# Patient Record
Sex: MALE | Race: BLACK OR AFRICAN AMERICAN | Employment: FULL TIME | URBAN - METROPOLITAN AREA
[De-identification: names, ages, dates, MRNs, and addresses within clinical notes are randomized per-mention and may not be internally consistent; named-entity substitution may affect disease eponyms.]

---

## 2017-02-17 ENCOUNTER — OFFICE VISIT (OUTPATIENT)
Dept: FAMILY MEDICINE CLINIC | Age: 57
End: 2017-02-17

## 2017-02-17 VITALS
HEART RATE: 88 BPM | BODY MASS INDEX: 25.48 KG/M2 | TEMPERATURE: 98.4 F | OXYGEN SATURATION: 98 % | SYSTOLIC BLOOD PRESSURE: 132 MMHG | WEIGHT: 188.1 LBS | DIASTOLIC BLOOD PRESSURE: 81 MMHG | HEIGHT: 72 IN | RESPIRATION RATE: 16 BRPM

## 2017-02-17 DIAGNOSIS — R36.9 PENILE DISCHARGE: Primary | ICD-10-CM

## 2017-02-17 DIAGNOSIS — Z11.3 SCREENING FOR GONORRHEA: ICD-10-CM

## 2017-02-17 DIAGNOSIS — Z20.2 STD EXPOSURE: ICD-10-CM

## 2017-02-17 DIAGNOSIS — Z11.8 SCREENING FOR CHLAMYDIAL DISEASE: ICD-10-CM

## 2017-02-17 PROCEDURE — 96372 THER/PROPH/DIAG INJ SC/IM: CPT | Performed by: FAMILY MEDICINE

## 2017-02-17 PROCEDURE — 99214 OFFICE O/P EST MOD 30 MIN: CPT | Performed by: FAMILY MEDICINE

## 2017-02-17 PROCEDURE — 81003 URINALYSIS AUTO W/O SCOPE: CPT | Performed by: FAMILY MEDICINE

## 2017-02-17 RX ORDER — CEFTRIAXONE SODIUM 250 MG/1
250 INJECTION, POWDER, FOR SOLUTION INTRAMUSCULAR; INTRAVENOUS ONCE
Status: COMPLETED | OUTPATIENT
Start: 2017-02-17 | End: 2017-02-17

## 2017-02-17 RX ORDER — AZITHROMYCIN 500 MG/1
1000 TABLET, FILM COATED ORAL DAILY
Qty: 2 TABLET | Refills: 0 | Status: SHIPPED | OUTPATIENT
Start: 2017-02-17 | End: 2017-02-18

## 2017-02-17 RX ADMIN — CEFTRIAXONE SODIUM 250 MG: 250 INJECTION, POWDER, FOR SOLUTION INTRAMUSCULAR; INTRAVENOUS at 15:47

## 2017-02-17 ASSESSMENT — ENCOUNTER SYMPTOMS
CHOKING: 0
CHEST TIGHTNESS: 0
BLOOD IN STOOL: 0
CONSTIPATION: 0
VOMITING: 0
ABDOMINAL DISTENTION: 0
COUGH: 0
DIARRHEA: 0
BACK PAIN: 0
ANAL BLEEDING: 0
WHEEZING: 0
ABDOMINAL PAIN: 0
SHORTNESS OF BREATH: 0
APNEA: 0
STRIDOR: 0
RECTAL PAIN: 0
NAUSEA: 0

## 2017-02-18 LAB
BILIRUBIN URINE: NEGATIVE
BLOOD, URINE: NEGATIVE
CLARITY: CLEAR
COLOR: YELLOW
EPITHELIAL CELLS, UA: 2 /HPF (ref 0–5)
GLUCOSE URINE: >=1000 MG/DL
HYALINE CASTS: 3 /HPF (ref 0–8)
KETONES, URINE: NEGATIVE MG/DL
LEUKOCYTE ESTERASE, URINE: ABNORMAL
MICROSCOPIC EXAMINATION: YES
NITRITE, URINE: NEGATIVE
PH UA: 6.5
PROTEIN UA: ABNORMAL MG/DL
RBC UA: 5 /HPF (ref 0–4)
SPECIFIC GRAVITY UA: 1.03
URINE TYPE: ABNORMAL
UROBILINOGEN, URINE: 1 E.U./DL
WBC UA: 15 /HPF (ref 0–5)

## 2017-02-19 LAB — URINE CULTURE, ROUTINE: NORMAL

## 2017-02-20 LAB
CHLAMYDIA TRACHOMATIS AMPLIFIED DET: NORMAL
N GONORRHOEAE AMPLIFIED DET: NORMAL

## 2017-04-11 ENCOUNTER — HOSPITAL ENCOUNTER (OUTPATIENT)
Dept: OTHER | Age: 57
Discharge: HOME | End: 2017-04-11
Attending: FAMILY MEDICINE | Admitting: FAMILY MEDICINE

## 2017-04-11 ENCOUNTER — TELEPHONE (OUTPATIENT)
Dept: FAMILY MEDICINE CLINIC | Age: 57
End: 2017-04-11

## 2017-04-11 ENCOUNTER — OFFICE VISIT (OUTPATIENT)
Dept: FAMILY MEDICINE CLINIC | Age: 57
End: 2017-04-11

## 2017-04-11 VITALS
BODY MASS INDEX: 25.33 KG/M2 | TEMPERATURE: 97.7 F | RESPIRATION RATE: 16 BRPM | WEIGHT: 187 LBS | OXYGEN SATURATION: 98 % | HEART RATE: 89 BPM | DIASTOLIC BLOOD PRESSURE: 84 MMHG | SYSTOLIC BLOOD PRESSURE: 128 MMHG | HEIGHT: 72 IN

## 2017-04-11 DIAGNOSIS — E11.9 CONTROLLED TYPE 2 DIABETES MELLITUS WITHOUT COMPLICATION, WITHOUT LONG-TERM CURRENT USE OF INSULIN (HCC): ICD-10-CM

## 2017-04-11 DIAGNOSIS — Z01.818 PREOP EXAMINATION: Primary | ICD-10-CM

## 2017-04-11 DIAGNOSIS — I10 ESSENTIAL HYPERTENSION, BENIGN: ICD-10-CM

## 2017-04-11 DIAGNOSIS — E78.5 HYPERLIPIDEMIA WITH TARGET LDL LESS THAN 100: ICD-10-CM

## 2017-04-11 DIAGNOSIS — F17.200 TOBACCO USE DISORDER: ICD-10-CM

## 2017-04-11 DIAGNOSIS — Z01.818 PREOP EXAMINATION: ICD-10-CM

## 2017-04-11 DIAGNOSIS — M65.331 TRIGGER MIDDLE FINGER OF RIGHT HAND: ICD-10-CM

## 2017-04-11 LAB
A/G RATIO: 2.3 (ref 1.1–2.2)
ALBUMIN SERPL-MCNC: 4.5 G/DL (ref 3.4–5)
ALP BLD-CCNC: 169 U/L (ref 40–129)
ALT SERPL-CCNC: 12 U/L (ref 10–40)
ANION GAP SERPL CALCULATED.3IONS-SCNC: 16 MMOL/L (ref 3–16)
AST SERPL-CCNC: 13 U/L (ref 15–37)
BILIRUB SERPL-MCNC: 1.8 MG/DL (ref 0–1)
BUN BLDV-MCNC: 15 MG/DL (ref 7–20)
CALCIUM SERPL-MCNC: 9.6 MG/DL (ref 8.3–10.6)
CHLORIDE BLD-SCNC: 101 MMOL/L (ref 99–110)
CHOLESTEROL, TOTAL: 223 MG/DL (ref 0–199)
CO2: 24 MMOL/L (ref 21–32)
CREAT SERPL-MCNC: 1.1 MG/DL (ref 0.9–1.3)
CREATININE URINE: 182.3 MG/DL (ref 39–259)
EKG ATRIAL RATE: 58 BPM
EKG DIAGNOSIS: NORMAL
EKG P AXIS: 80 DEGREES
EKG P-R INTERVAL: 150 MS
EKG Q-T INTERVAL: 388 MS
EKG QRS DURATION: 98 MS
EKG QTC CALCULATION (BAZETT): 380 MS
EKG R AXIS: 85 DEGREES
EKG T AXIS: 68 DEGREES
EKG VENTRICULAR RATE: 58 BPM
GFR AFRICAN AMERICAN: >60
GFR NON-AFRICAN AMERICAN: >60
GLOBULIN: 2 G/DL
GLUCOSE BLD-MCNC: 190 MG/DL (ref 70–99)
HDLC SERPL-MCNC: 49 MG/DL (ref 40–60)
LDL CHOLESTEROL CALCULATED: 146 MG/DL
MICROALBUMIN UR-MCNC: 1.3 MG/DL
MICROALBUMIN/CREAT UR-RTO: 7.1 MG/G (ref 0–30)
POTASSIUM SERPL-SCNC: 4.2 MMOL/L (ref 3.5–5.1)
SODIUM BLD-SCNC: 141 MMOL/L (ref 136–145)
TOTAL PROTEIN: 6.5 G/DL (ref 6.4–8.2)
TRIGL SERPL-MCNC: 140 MG/DL (ref 0–150)
VLDLC SERPL CALC-MCNC: 28 MG/DL

## 2017-04-11 PROCEDURE — 93010 ELECTROCARDIOGRAM REPORT: CPT | Performed by: INTERNAL MEDICINE

## 2017-04-11 PROCEDURE — 99243 OFF/OP CNSLTJ NEW/EST LOW 30: CPT | Performed by: FAMILY MEDICINE

## 2017-04-11 RX ORDER — NICOTINE 21 MG/24HR
1 PATCH, TRANSDERMAL 24 HOURS TRANSDERMAL EVERY 24 HOURS
Qty: 30 PATCH | Refills: 0 | Status: SHIPPED | OUTPATIENT
Start: 2017-04-11 | End: 2019-09-05 | Stop reason: CLARIF

## 2017-04-11 ASSESSMENT — ENCOUNTER SYMPTOMS
ABDOMINAL DISTENTION: 0
VOMITING: 0
WHEEZING: 0
DIARRHEA: 0
CHOKING: 0
NAUSEA: 0
COLOR CHANGE: 0
SINUS PRESSURE: 0
PHOTOPHOBIA: 0
CONSTIPATION: 0
CHEST TIGHTNESS: 0
EYE ITCHING: 0
EYE PAIN: 0
EYE DISCHARGE: 0
SHORTNESS OF BREATH: 0
TROUBLE SWALLOWING: 0
EYE REDNESS: 0
RECTAL PAIN: 0
RHINORRHEA: 0
BLOOD IN STOOL: 0
SORE THROAT: 0
FACIAL SWELLING: 0
ANAL BLEEDING: 0
APNEA: 0
COUGH: 0
BACK PAIN: 0
VOICE CHANGE: 0
STRIDOR: 0
ABDOMINAL PAIN: 0

## 2017-04-12 LAB
ESTIMATED AVERAGE GLUCOSE: 145.6 MG/DL
HBA1C MFR BLD: 6.7 %

## 2017-11-08 ENCOUNTER — OFFICE VISIT (OUTPATIENT)
Dept: ORTHOPEDIC SURGERY | Age: 57
End: 2017-11-08

## 2017-11-08 VITALS
HEIGHT: 72 IN | SYSTOLIC BLOOD PRESSURE: 138 MMHG | DIASTOLIC BLOOD PRESSURE: 95 MMHG | HEART RATE: 80 BPM | BODY MASS INDEX: 27.09 KG/M2 | WEIGHT: 200 LBS

## 2017-11-08 DIAGNOSIS — S62.615A CLOSED DISPLACED FRACTURE OF PROXIMAL PHALANX OF LEFT RING FINGER, INITIAL ENCOUNTER: Primary | ICD-10-CM

## 2017-11-08 DIAGNOSIS — S62.627A CLOSED DISPLACED FRACTURE OF MIDDLE PHALANX OF LEFT LITTLE FINGER, INITIAL ENCOUNTER: ICD-10-CM

## 2017-11-08 PROCEDURE — L3660 SO 8 AB RSTR CAN/WEB PRE OTS: HCPCS | Performed by: ORTHOPAEDIC SURGERY

## 2017-11-08 PROCEDURE — 99203 OFFICE O/P NEW LOW 30 MIN: CPT | Performed by: ORTHOPAEDIC SURGERY

## 2017-11-08 NOTE — LETTER
[facility]  [place] [address] [address]  Phone: [phone]  Fax: [phone]    Orlando Long MD        November 8, 2017     Patient: Parris Pineda   YOB: 1960   Date of Visit: 11/8/2017       To Whom It May Concern: It is my medical opinion that Parris Pineda may return to light duty immediately with the following restrictions: no work involving left hand. Patient is also  having surgery on 11/10 which he will be unable to work 11/10-11/12/17. If you have any questions or concerns, please don't hesitate to call.     Sincerely,          Orlando Long MD

## 2017-11-08 NOTE — PROGRESS NOTES
proximal phalanx fracture  2. Left closed, comminuted extra-articular small finger middle phalanx fracture    Impression:   Encounter Diagnoses   Name Primary?  Closed displaced fracture of proximal phalanx of left ring finger, initial encounter Yes    Closed displaced fracture of middle phalanx of left little finger, initial encounter        Office Procedures:  Orders Placed This Encounter   Procedures    Velpeau/Shure Shoulder Immobilizer Brace     Patient was prescribed a Breg Shure Shoulder Immobilizer. The left shoulder will require stabilization / immobilization from this orthosis. The orthosis will assist in protecting the affected area, provide functional support and facilitate healing. The patient was educated and fit by a healthcare professional with expert knowledge and specialization in brace application while under the direct supervision of the treating physician. Verbal and written instructions for the use of and application of this item were provided. They were instructed to contact the office immediately should the brace result in increased pain, decreased sensation, increased swelling or worsening of the condition. Treatment Plan:  I had a discussion with the patient and his neighbor friend today regarding the results of his radiographs as well as clinical exam. We discussed the injury, treatment options were also discussed including operative and nonoperative intervention. Nonoperative intervention would include immobilization with ulnar gutter brace. Risks of nonoperative treatment include persistent or worsening angulation or malrotation. I do have concern that this is an unstable fracture pattern in both fingers and may lead to further displacement resulting in decreased function. For this reason, I do think that operative intervention would be beneficial to the patient.  I suggested closed versus open reduction and percutaneous pin fixation versus internal fixation with plate

## 2017-11-08 NOTE — LETTER
Celso Bhatti M.D. Bianca Singer  Your surgery has been scheduled on    Fri. 11/10/17 . Your surgery time is at: 9:30am.          You will need to arrive for surgery by  7:30am.      Your surgery has been scheduled at:        [facility] [address]  [address]  [phone]         Your post operative appointment has been scheduled on  @ , @ the  office  location. Your hand therapy appointment has been scheduled on  @ , @ the  office location with           [facility], [place], operated by [facility], GO TO [email or web address].[facility] AND CLICK ON  LOCATIONS ON THE TOP TOOLBAR      INSTRUCTIONS FOR SURGERY    You will need to contact your primary care physician to schedule your History & Physical prior to surgery. Attached   is the History & Physical form. The form needs to be completed by your PCP and faxed back to the appropriate numbers. NOTHING to eat or drink after midnight prior to your surgery. No Juice, water, milk, coffee, tea, Ensure, etc,.    DO NOT wear any jewelry, make-up on face or eye make-up. Do not bring any valuable to the hospital with you. Stop any aspirin or anti-inflammatories (i.e. Advil, Motrin, Aleve) prior to surgery. If you are taking prescription   medications, please call the prescribing physician. The hospital will be calling you to discuss your medical history prior to surgery. You will need someone to drive you home after surgery. DRIVERS MUST STAY AT [address] [place] [address]. Our billing office will contact your insurance company to authorize your surgery. If you should have any questions, please contact Kimi Hernandez @ [phone]   OR @  [email or web address] or visit the [employer] website @ [email or web address] ([email or web address])                          Alexandra Yoa M.D. KEEP YOUR HAND ELEVATED - Surgery always results in swelling. Most of the pain and stiffness right after surgery is due to internal pressure from swelling. To relieve the pressure, raise your hand higher than your heart as often as possible to drain fluid out of your hand for at least three (3) days after surgery. Swelling may also make the cast or bandage tight, which will cause more pain and swelling. If you feel that your cast or bandage is too tight, please contact me or Neyda Anderson- we would rather change it than for you to have a problem. KEEP YOUR BANDAGE DRY -  Wounds heal with the fewest problems if they are kept clean and dry. When bathing, protect your bandage in a plastic bag. If you bandage gets wet on the inside, it should be changed not simply allowed to dry. I would rather change your cast or bandage then risk a problem with your wound. DO NOT REMOVE OR CHANGE YOUR BANDAGES - unless you have been given specific instructions from Dr. Joe Wheeler to change them before being seen for you post operative appointment. BRUISING OR BLEEDING - This is  Common after surgery. Bandages often become stained with blood on the day of surgery. Bruising often worsens several days after surgery. Bruising or bleeding is usually not a source of concern unless accompanied by steady drainage, worsening pain, or progressive swelling. MEDICATIONS - You will most likely be given at least one prescription following surgery. All medications should be taken only as directed on the prescription. Nausea is common when taking pain medications. Take the pain medicine only as needed and not on an empty stomach. Itching or a rash are signs of possible mild allergic reaction.   Contact our office should this persist.    HAND THERAPY:  (Only as checked off here)       No Therapy will be needed at this time      Every two (2) hours, do this (4) times daily:  With your bandage on, try to make a fist and then straighten your fingers. You have been scheduled for an Occupational Hand Therapy appointment after surgery. Please refer to your letter for the date, time and location. INFECTION - should be suspected if there is redness, pain or swelling that gets worse over the course of the day or night, despite elevating the hand. Infection is uncommon less than four days after surgery or more than two weeks after surgery. Please contact my office if you have any questions about infection. POST-OPERATIVE APPOINTMENT - Your post operative appointment after surgery has already been made, please refer to your surgery letter. If you appointment was not pre-scheduled, call Sheri Chapman @ [phone] [email or web address] your appointment. Remember - if you have any problems or questions after surgery, please call Sheri Chapman- not the surgery center. Dear  Rhoda Conway MD            Your patient, Saman Edmond, 1960, is scheduled for surgery on:        11/10/17. For your convenience, attached is a copy of our  History and Physical  form for you to complete. Attached is   a list of pre admission testing required by [facility] prior to the patient's  procedure. All pre-admission testing is based on the patient's health history and the procedure performed. Please fax any pre-admission testing and the History and Physical form to [facility] @ [phone] as soon as possible. Also, fax a copy to [address] [place] [address] @ fax # [phone]    Have your physical done one to two weeks prior to surgery, have your physician give you a copy to hand carry to   surgery. Thank you for your assistance in the coordination of this patient's procedure. Failure to receive this information   may result in a delay of the patient's procedure    Thank you. ...                                           Clinical History Physical Examination       Patient Name________________________    Date of Birth ______________ Date to be admitted ________________    Reason for Procedure _________________________________________      History of Present Illness:      Past History:      Current medications: Allergies: All Yes answers require amplification or comment. YES NO Comments:   Diabetes      History of Steroid Treatment      Bleeding Tendency      Weight Loss      Smoking          Currently smoking           History of smoking                  R.O.S. Pain or discomfort      Cardiovascular:         Chest Pain         History of MI         Syncope         Other pertinent symptoms                              Respiratory:         Effort Intolerance         History of Asthma         Cough         Other pertinent symptoms                               Neurological:       History of Transient neurological symptoms         Other pertinent symptoms                  Gastrointestinal:         Abdominal Pain         Nausea         History of Hepatitis         Other pertinent symptoms                              Reproductive:           Other pertinent symptoms   LMP Date:          Other: (Musculoskeletal, endocrine, , etc.)            Please fax Pre-Operative H&P to [phone]

## 2017-11-09 ENCOUNTER — TELEPHONE (OUTPATIENT)
Dept: ORTHOPEDIC SURGERY | Age: 57
End: 2017-11-09

## 2017-11-09 ENCOUNTER — OFFICE VISIT (OUTPATIENT)
Dept: FAMILY MEDICINE CLINIC | Age: 57
End: 2017-11-09

## 2017-11-09 VITALS
WEIGHT: 194 LBS | HEIGHT: 72 IN | BODY MASS INDEX: 26.28 KG/M2 | TEMPERATURE: 97.8 F | OXYGEN SATURATION: 98 % | RESPIRATION RATE: 16 BRPM | SYSTOLIC BLOOD PRESSURE: 134 MMHG | HEART RATE: 82 BPM | DIASTOLIC BLOOD PRESSURE: 78 MMHG

## 2017-11-09 DIAGNOSIS — E78.2 MIXED HYPERLIPIDEMIA: ICD-10-CM

## 2017-11-09 DIAGNOSIS — Z01.818 PREOP EXAMINATION: Primary | ICD-10-CM

## 2017-11-09 DIAGNOSIS — Z28.21 INFLUENZA VACCINATION DECLINED: ICD-10-CM

## 2017-11-09 DIAGNOSIS — I10 ESSENTIAL HYPERTENSION, BENIGN: ICD-10-CM

## 2017-11-09 DIAGNOSIS — Z12.11 COLON CANCER SCREENING: ICD-10-CM

## 2017-11-09 DIAGNOSIS — Z12.5 SCREENING PSA (PROSTATE SPECIFIC ANTIGEN): ICD-10-CM

## 2017-11-09 DIAGNOSIS — Z01.818 PREOP EXAMINATION: ICD-10-CM

## 2017-11-09 DIAGNOSIS — S62.615S CLOSED DISPLACED FRACTURE OF PROXIMAL PHALANX OF LEFT RING FINGER, SEQUELA: ICD-10-CM

## 2017-11-09 DIAGNOSIS — E11.9 TYPE 2 DIABETES MELLITUS WITHOUT COMPLICATION, WITHOUT LONG-TERM CURRENT USE OF INSULIN (HCC): ICD-10-CM

## 2017-11-09 DIAGNOSIS — F17.200 TOBACCO USE DISORDER: ICD-10-CM

## 2017-11-09 DIAGNOSIS — S62.627S CLOSED DISPLACED FRACTURE OF MIDDLE PHALANX OF LEFT LITTLE FINGER, SEQUELA: ICD-10-CM

## 2017-11-09 LAB
A/G RATIO: 1.7 (ref 1.1–2.2)
ALBUMIN SERPL-MCNC: 4 G/DL (ref 3.4–5)
ALP BLD-CCNC: 208 U/L (ref 40–129)
ALT SERPL-CCNC: 12 U/L (ref 10–40)
ANION GAP SERPL CALCULATED.3IONS-SCNC: 14 MMOL/L (ref 3–16)
AST SERPL-CCNC: 13 U/L (ref 15–37)
BILIRUB SERPL-MCNC: 0.9 MG/DL (ref 0–1)
BUN BLDV-MCNC: 16 MG/DL (ref 7–20)
CALCIUM SERPL-MCNC: 9.2 MG/DL (ref 8.3–10.6)
CHLORIDE BLD-SCNC: 100 MMOL/L (ref 99–110)
CO2: 26 MMOL/L (ref 21–32)
CREAT SERPL-MCNC: 1.2 MG/DL (ref 0.9–1.3)
CREATININE URINE: 154.2 MG/DL (ref 39–259)
GFR AFRICAN AMERICAN: >60
GFR NON-AFRICAN AMERICAN: >60
GLOBULIN: 2.4 G/DL
GLUCOSE BLD-MCNC: 301 MG/DL (ref 70–99)
HCT VFR BLD CALC: 41.9 % (ref 40.5–52.5)
HEMOGLOBIN: 14.3 G/DL (ref 13.5–17.5)
MCH RBC QN AUTO: 31 PG (ref 26–34)
MCHC RBC AUTO-ENTMCNC: 34.1 G/DL (ref 31–36)
MCV RBC AUTO: 90.7 FL (ref 80–100)
MICROALBUMIN UR-MCNC: <1.2 MG/DL
MICROALBUMIN/CREAT UR-RTO: NORMAL MG/G (ref 0–30)
PDW BLD-RTO: 12.6 % (ref 12.4–15.4)
PLATELET # BLD: 233 K/UL (ref 135–450)
PMV BLD AUTO: 8.8 FL (ref 5–10.5)
POTASSIUM SERPL-SCNC: 4.3 MMOL/L (ref 3.5–5.1)
PROSTATE SPECIFIC ANTIGEN: 3.28 NG/ML (ref 0–4)
RBC # BLD: 4.62 M/UL (ref 4.2–5.9)
SODIUM BLD-SCNC: 140 MMOL/L (ref 136–145)
TOTAL PROTEIN: 6.4 G/DL (ref 6.4–8.2)
WBC # BLD: 7.9 K/UL (ref 4–11)

## 2017-11-09 PROCEDURE — 99214 OFFICE O/P EST MOD 30 MIN: CPT | Performed by: FAMILY MEDICINE

## 2017-11-09 PROCEDURE — 93000 ELECTROCARDIOGRAM COMPLETE: CPT | Performed by: FAMILY MEDICINE

## 2017-11-09 RX ORDER — NICOTINE 21 MG/24HR
1 PATCH, TRANSDERMAL 24 HOURS TRANSDERMAL EVERY 24 HOURS
Qty: 30 PATCH | Refills: 0 | Status: SHIPPED | OUTPATIENT
Start: 2017-11-09 | End: 2018-04-11 | Stop reason: CLARIF

## 2017-11-09 ASSESSMENT — ENCOUNTER SYMPTOMS
CHEST TIGHTNESS: 0
STRIDOR: 0
VOICE CHANGE: 0
ABDOMINAL PAIN: 0
BLOOD IN STOOL: 0
VOMITING: 0
SORE THROAT: 0
RHINORRHEA: 0
EYE REDNESS: 0
SHORTNESS OF BREATH: 0
COLOR CHANGE: 0
PHOTOPHOBIA: 0
EYE ITCHING: 0
NAUSEA: 0
RECTAL PAIN: 0
CONSTIPATION: 0
FACIAL SWELLING: 0
CHOKING: 0
ANAL BLEEDING: 0
BACK PAIN: 0
ABDOMINAL DISTENTION: 0
TROUBLE SWALLOWING: 0
COUGH: 0
EYE PAIN: 0
APNEA: 0
DIARRHEA: 0
SINUS PRESSURE: 0
EYE DISCHARGE: 0
WHEEZING: 0

## 2017-11-09 NOTE — PROGRESS NOTES
if you have not been preregistered yet. On the day of your procedure bring your insurance card and photo ID. You will be registered at your bedside once brought back to your room. 5. DO NOT EAT OR DRINK ANYTHING AFTER MIDNIGHT. 6. MEDICATIONS    Take the following medications with a SMALL sip of water: none   Use your usual dose of inhalers the morning of surgery. BRING your rescue inhaler with you to hospital.    Anesthesia does NOT want you to take insulin the morning of surgery. They will control your blood sugar while you are at the hospital. Please contact your ordering physician for instructions regarding your insulin the night before your procedure. If you have an insulin pump, please keep it set on basal rate. 7. Do not swallow water when brushing teeth. No gum, candy, mints or ice chips. Refrain from smoking or at least decrease the amount. 8. Dress in loose, comfortable clothing appropriate for redressing after your procedure. Do not wear jewelry (including body piercings), make-up (especially NO eye make-up), fingernail polish (NO toenail polish if foot/leg surgery), lotion, powders or metal hairclips. 9. Dentures, glasses, or contacts will need to be removed before your procedure. Bring cases for your glasses, contacts, dentures, or hearing aids to protect them while you are in surgery. 10. If you use a CPAP, please bring it with you on the day of your procedure. 11. We recommend that valuable personal  belongings, such as credit cards, cash, cell phones, e-tablets or jewelry, be left at home during your stay. The hospital will not be responsible for valuables that are not secured in the hospital safe. However, if your insurance requires a co-pay, you may want to bring a method of payment, i.e. Check or credit card, if you wish to pay your co-pay the day of surgery. 12. If you are to stay overnight, you may bring a bag with personal items.  Please have any large items

## 2017-11-09 NOTE — PROGRESS NOTES
The following educational items and goals will be achieved upon completion of the patient's Pre-admission testing experience:             Identify the learner who is being assessed for education:  patient                    Ability to Learn:  Exhibits ability to grasp concepts and respond to questions: High  Ready to Learn: Yes  calm   Preferred Method of Learning:  verbal  Barriers to Learning: Verbalizes interest  Special Considerations due to cultural, Spiritism, spiritual beliefs:  No  Language:  English  :  Cheryl Cuevas  [x] Appropriate evaluation / integration of data as delineated by ASPAN Standards of Perianesthesia Nursing Practice    Pain scale and pain management   [x]Patient verbalizes understanding of pain scale and pain management  [x]Pre-operative determination of patients anticipated Post-Operative pain goal:   4 of 10 on 10 point scale post op goal  [] Other     Medication(s) - Compliance with preop medication instructions  [x] Patient verbalizes understanding of preop medications (see [facility] Presurgical Instructions)    Instructions, Pre op                                                                                            [x] Patient verbalizes understanding of presurgical instructions as reviewed with phone interview nurse or in-person nurse review    Fall Risk Potential, Preoperatively                                                                                   [x]No preoperative risk identified  []Preop risk identified:                    []Sensory deficit        []Motor deficit        []Balance problem        []Home medication        []Uses assistive device                    []History of a Fall within the last 30 days    Goal(s) for fall prevention:  [x]Prevent fall or injury by requesting assistance with activities of daily living  [x]Patient / Significant other verbalizes understanding the need to call for assistance prior to getting out of bed during hospitalization      Infection Precautions                                                                                            [x] Patient understands implementation of Surgical Site Infection precautions (see [facility] Presurgical Instructions)     Patient Safety  [x] Patient identification verified  [x] Site verified    Instructions - Discharge Planning for Outpatients  [x] Patient / significant other voices understanding of home care and follow up procedures  [x] Encourage patient / significant other to review discharge instructions the day after procedure due to sedation on day of surgery    Anticipated Special Needs upon discharge:        [] Cooling device        [] Crutches       [] Tamea Bongo        [] Wound Support device        [] Drain        [] Other       Instructions - Discharge Planning for Admitted patients  [] Patient / significant other understands plan for admission after surgery  [x] Patient / significant other understands plan for anticipated discharge dispostion        11/9/2017 10:28 AM Issa Marcelino

## 2017-11-09 NOTE — PROGRESS NOTES
[facility] / [facility] ([facility]) [place], [address]    Acknowledgment of Informed Consent for Surgical or Medical Procedure and Sedation  I agree to allow doctor(s) [facility] and his/her associates or assistants, including residents and/or other qualified medical practitioner to perform the following medical treatment or procedure and to administer or direct the administration of sedation as necessary:  Procedure(s): OPERATIVE FIXATION OF LEFT RING FINGER PROXIMAL PHALANX AND LEFT SMALL FINGER MIDDLE [place]; INCLUDING OPEN REDUCTION INTERNAL FIXATION VERSUS PERCUTANEOUS PINNING FIXATION  My doctor has explained the following regarding the proposed procedure:   the explanation of the procedure   the benefits of the procedure   the potential problems that might occur during recuperation   the risks and side effects of the procedure which could include but are not limited to severe blood loss, infection, stroke or death   the benefits, risks and side effect of alternative procedures including the consequences of declining this procedure or any alternative procedures   the likelihood of achieving satisfactory results. I acknowledge no guarantee or assurance has been made to me regarding the results. I understand that during the course of this treatment/procedure, unforeseen conditions can occur which require an additional or different procedure. I agree to allow my physician or assistants to perform such extension of the original procedure as they may find necessary. I understand that sedation will often result in temporary impairment of memory and fine motor skills and that sedation can occasionally progress to a state of deep sedation or general anesthesia.     I understand the risks of anesthesia for surgery include, but are not limited to, sore throat, hoarseness, injury to face, mouth, or teeth; nausea; headache; injury to blood vessels or nerves; death, brain damage, or paralysis. I understand that if I have a Limitation of Treatment order in effect during my hospitalization, the order may or may not be in effect during this procedure. I give my doctor permission to give me blood or blood products. I understand that there are risks with receiving blood such as hepatitis, AIDS, fever, or allergic reaction. I acknowledge that the risks, benefits, and alternatives of this treatment have been explained to me and that no express or implied warranty has been given by the hospital, any blood bank, or any person or entity as to the blood or blood components transfused. At the discretion of my doctor, I agree to allow observers, equipment/product representatives and allow photographing, and/or televising of the procedure, provided my name or identity is maintained confidentially. I agree the hospital may dispose of or use for scientific or educational purposes any tissue, fluid, or body parts which may be removed.     ________________________________Date________Time______ am/pm  ([place] [facility])  Patient or Signature of Closest Relative or Legal Guardian    ________________________________Date________Time______am/pm      Page 1 of  2  Witness

## 2017-11-09 NOTE — TELEPHONE ENCOUNTER
CPT 70740 Auth: [employer]  Date: 11-10-17  SX area: Lt hand      [employer] policy is for preventative services only. Will not cover surgery.

## 2017-11-09 NOTE — TELEPHONE ENCOUNTER
Spoke with patient to inform him that his surgery for tomorrow @ [facility] was changed to start at 8:30am and to be at the hospital by 6:30am .

## 2017-11-09 NOTE — PROGRESS NOTES
Subjective:      Patient ID: Austin Pena is a 59 y.o. male. HPI    Last seen 4/2017:pt' has not followed up to following labs since prior visit. Results for Rika Farley (MRN [id number]) as of 11/9/2017 15:07   Ref.  Range 4/11/2017 12:14 4/11/2017 12:51 4/11/2017 13:11 4/11/2017 13:38 11/6/2017 12:46 11/6/2017 12:46   Sodium Latest Ref Range: 136 - 145 mmol/L 141        Potassium Latest Ref Range: 3.5 - 5.1 mmol/L 4.2        Chloride Latest Ref Range: 99 - 110 mmol/L 101        CO2 Latest Ref Range: 21 - 32 mmol/L 24        BUN Latest Ref Range: 7 - 20 mg/dL 15        Creatinine Latest Ref Range: 0.9 - 1.3 mg/dL 1.1        Anion Gap Latest Ref Range: 3 - 16  16        GFR Non- Latest Ref Range: >60  >60        GFR  Latest Ref Range: >60  >60        Glucose Latest Ref Range: 70 - 99 mg/dL 190 (H)        Calcium Latest Ref Range: 8.3 - 10.6 mg/dL 9.6        Total Protein Latest Ref Range: 6.4 - 8.2 g/dL 6.5        Cholesterol, Total Latest Ref Range: 0 - 199 mg/dL 223 (H)        HDL Cholesterol Latest Ref Range: 40 - 60 mg/dL 49        LDL Calculated Latest Ref Range: <100 mg/dL 146 (H)        Triglycerides Latest Ref Range: 0 - 150 mg/dL 140        VLDL CHOLESTEROL CALCULATED Latest Ref Range: Not Established mg/dL 28        Albumin Latest Ref Range: 3.4 - 5.0 g/dL 4.5        Globulin Latest Units: g/dL 2.0        Albumin/Globulin Ratio Latest Ref Range: 1.1 - 2.2  2.3 (H)        Alk Phos Latest Ref Range: 40 - 129 U/L 169 (H)        ALT Latest Ref Range: 10 - 40 U/L 12        AST Latest Ref Range: 15 - 37 U/L 13 (L)        Bilirubin Latest Ref Range: 0.0 - 1.0 mg/dL 1.8 (H)        Hemoglobin A1C Latest Ref Range: See comment % 6.7        eAG (mg/dL) Latest Units: mg/dL 145.6        Creatinine, Ur Latest Ref Range: 39.0 - 259.0 mg/dL    182.3     Microalb Creat Ratio Latest Ref Range: 0.0 - 30.0 mg/g    7.1     MICROALBUMIN, RANDOM URINE Latest Ref Range: <2.0 mg/dL    1.30 CT HEAD WO CONTRAST Unknown     Rpt    CT CERVICAL SPINE WO CONTRAST Unknown      Rpt   XR CHEST STANDARD TWO VW Unknown  Rpt       EKG 12-LEAD Unknown   Attch      Atrial Rate Latest Units: BPM   58      Diagnosis Unknown   Sinus bradycardia. .. P Axis Latest Units: degrees   80      P-R Interval Latest Units: ms   150      Q-T Interval Latest Units: ms   388      QRS Duration Latest Units: ms   98      QTc Calculation (Bazett) Latest Units: ms   380      R Axis Latest Units: degrees   85      T Axis Latest Units: degrees   68      Ventricular Rate Latest Units: BPM   58        Presenting for preoperative physical exam.   Surgeon/specialist requesting preop:Dr. Rogers:[facility]  Procedure: left hand ORIF. Procedure date:11/10/17. Elective procedure:Yes   General anesthesia(GA). Prior GA: yes w/o problems. GA complications:No.  Prior local anesthesia(LA):yes w/o problems. Exercise capacity:Walks regularly. Climbs flight of stairs w/o inappropriate sob. Excellent/good functional capacity:yes. Preop paperwork from surgeon/specialist for completion:Yes. Labs/test requested from surgeon/specialist:none. Denies cp/sob/dizziness/pnd/palpitations. Smokes:7-10cigs. Wishes to quit using nicotine patches. Diabetes:states he has not checked his sugars recently but feels well. See A1c above. Allergies   Allergen Reactions    Ibuprofen Swelling     swelling       Current Outpatient Prescriptions on File Prior to Visit   Medication Sig Dispense Refill    HYDROcodone-acetaminophen (NORCO) 5-325 MG per tablet Take 1 tablet by mouth every 6 hours as needed for Pain  Sedation precautions please. 12 tablet 0    nicotine (NICODERM CQ) 14 MG/24HR Place 1 patch onto the skin every 24 hours 30 patch 0    aspirin 81 MG tablet Take 81 mg by mouth daily       No current facility-administered medications on file prior to visit.         Past Medical History:   Diagnosis Date    BPH (benign prostatic hypertrophy) 9/2/2015    Clostridium difficile infection 10/24/2016    Diabetes mellitus ([facility] [address] 2012    Elevated LDL cholesterol level 1/27/2015    Essential hypertension, benign 1/27/2015    S/P colonoscopy 2011    Nml per pt'. Past Surgical History:   Procedure Laterality Date    LAPAROSCOPIC APPENDECTOMY         Social History   Substance Use Topics    Smoking status: Current Some Day Smoker     Packs/day: 0.25     Years: 10.00     Types: Cigarettes    Smokeless tobacco: Never Used    Alcohol use 0.0 oz/week      Comment: occasionally     History   Drug Use No     Comment: 5-6 years ago        Family History   Problem Relation Age of Onset    Diabetes Father     Cancer Neg Hx     Asthma Neg Hx     Heart Failure Neg Hx     High Cholesterol Neg Hx     Hypertension Neg Hx     Migraines Neg Hx     Rashes/Skin Problems Neg Hx     Seizures Neg Hx     Stroke Neg Hx     Thyroid Disease Neg Hx          Review of Systems   Constitutional: Negative for activity change, appetite change, chills, diaphoresis, fatigue, fever and unexpected weight change. Negative for:Difficulty sleeping/night sweats/unexplained weight loss or gain/malaise   HENT: Negative for congestion, dental problem, drooling, ear discharge, ear pain, facial swelling, hearing loss, mouth sores, nosebleeds, postnasal drip, rhinorrhea, sinus pressure, sneezing, sore throat, tinnitus, trouble swallowing and voice change. Negative for:Dizziness/vertigo   Eyes: Negative for photophobia, pain, discharge, redness, itching and visual disturbance. Respiratory: Negative for apnea, cough, choking, chest tightness, shortness of breath, wheezing and stridor. Negative for:Hemoptysis/hoarseness/pain on inspiration. Breasts:tenderness/masses/nipple discharge/skin changes. Cardiovascular: Negative for chest pain, palpitations and leg swelling.         Negative for:Syncope/presyncope/lower extremity edema/claudication/PND/irregular heart beats   Gastrointestinal: Negative for abdominal distention, abdominal pain, anal bleeding, blood in stool, constipation, diarrhea, nausea, rectal pain and vomiting. Negative for:Melena/bloating/dysphagia/reflux/loss of appetite   Endocrine: Negative for cold intolerance, heat intolerance, polydipsia, polyphagia and polyuria. Genitourinary: Negative for decreased urine volume, difficulty urinating, discharge, dysuria, enuresis, flank pain, frequency, genital sores, hematuria, penile pain, penile swelling, scrotal swelling, testicular pain and urgency. Musculoskeletal: Positive for arthralgias. Negative for back pain, gait problem, myalgias, neck pain and neck stiffness. Skin: Negative for color change, pallor, rash and wound. Neurological: Negative for dizziness, tremors, seizures, syncope, facial asymmetry, speech difficulty, weakness, light-headedness, numbness and headaches. Negative for:Visual disturbance/muscular weakness/paralysis/memory problems. Hematological: Negative for adenopathy. Does not bruise/bleed easily. Negative for:Lymph node tenderness   Psychiatric/Behavioral: Negative for agitation, behavioral problems, confusion, decreased concentration, dysphoric mood, hallucinations, self-injury, sleep disturbance and suicidal ideas. The patient is not nervous/anxious and is not hyperactive. Negative for:Homicidal tendencies(HI)       Objective:   Physical Exam   Constitutional: He is oriented to person, place, and time. Vital signs are normal. He appears well-developed and well-nourished. He is cooperative. He does not have a sickly appearance. No distress. Well hydrated/well appearing. HENT:   Head: Normocephalic and atraumatic.    Right Ear: Hearing, tympanic membrane, external ear and ear canal normal.   Left Ear: Hearing, tympanic membrane, external ear and ear canal normal.   Nose: Nose normal.   Mouth/Throat: Uvula is midline, oropharynx is clear and moist and mucous membranes are normal. No oropharyngeal exudate. Eyes: Conjunctivae, EOM and lids are normal. Pupils are equal, round, and reactive to light. Neck: Trachea normal, normal range of motion and full passive range of motion without pain. Neck supple. No JVD present. No spinous process tenderness and no muscular tenderness present. Carotid bruit is not present. No thyroid mass and no thyromegaly present. Cardiovascular: Normal rate, regular rhythm, normal heart sounds, intact distal pulses and normal pulses. Exam reveals no gallop. No murmur heard. No leg-ankle edema. Pulmonary/Chest: Effort normal and breath sounds normal. No respiratory distress. CTAB,good AE bilaterally   Abdominal: Soft. Normal appearance and bowel sounds are normal. He exhibits no distension, no abdominal bruit and no mass. There is no splenomegaly or hepatomegaly. There is no tenderness. There is no rigidity and no guarding. Musculoskeletal:        Right shoulder: Normal.        Left shoulder: Normal.        Right elbow: Normal.       Left elbow: Normal.        Right wrist: Normal.        Left wrist: Normal.        Right hip: Normal.        Left hip: Normal.        Right knee: Normal.        Left knee: Normal.        Right ankle: Normal.        Left ankle: Normal.        Cervical back: Normal.        Thoracic back: Normal.        Lumbar back: Normal.        Right upper arm: Normal.        Left upper arm: Normal.        Right forearm: Normal.        Right upper leg: Normal.        Left upper leg: Normal.        Right lower leg: Normal.        Left lower leg: Normal.        Right foot: Normal.        Left foot: Normal.   Left hand cast in-situ. Lymphadenopathy:        Head (right side): No submental, no submandibular, no tonsillar, no preauricular, no posterior auricular and no occipital adenopathy present.         Head (left side): No submental, no submandibular, no tonsillar, no preauricular, no posterior auricular and no occipital adenopathy present. He has no cervical adenopathy. No supraclavicular LAD   Neurological: He is alert and oriented to person, place, and time. He has normal strength and normal reflexes. No cranial nerve deficit or sensory deficit. He displays a negative Romberg sign. Nml gait. Able to stand w/o difficulty   Skin: Skin is warm, dry and intact. No abrasion and no rash noted. He is not diaphoretic. No cyanosis. Nails show no clubbing. Capillary refill=2-3secs   Psychiatric: He has a normal mood and affect. His speech is normal and behavior is normal. Judgment and thought content normal. Cognition and memory are normal.       Assessment:      1. Preop examination  VSS/well appearing. Ok to proceed with surgery if stabl/nml cbc/cmp. Rhythm: normal sinus  Rate: normal  Axis: normal  Ectopy: none  Conduction: normal  ST segments: no acute change  T waves: no acute change  Q waves: none  Clinical impression of EKG: No acute change since prior EKG 4/11/17. Comprehensive Metabolic Panel    CBC    EKG 12 Lead   2. Closed displaced fracture of proximal phalanx of left ring finger, sequela  Per ortho. 3. Closed displaced fracture of middle phalanx of left little finger, sequela  Per ortho. 4. Essential hypertension, benign  Stable. Comprehensive Metabolic Panel    CBC    EKG 12 Lead   5. Type 2 diabetes mellitus without complication, without long-term current use of insulin (HCC)  Stable. Check recent labs. Check stress test after surgery as baseline due to multiple risk factors:pt' aware & will f/u in 3weeks for order & lab test results. Comprehensive Metabolic Panel    Hemoglobin A1C    Microalbumin / Creatinine Urine Ratio    EKG 12 Lead   6. Mixed hyperlipidemia  Check recent labs. EKG 12 Lead   7. Tobacco use disorder  Counseled. Strongly encouraged to quit. Possible med side effects reviewed. Pt' wishes to proceed w/med  nicotine (NICODERM CQ) 14 MG/24HR   8.  Influenza vaccination

## 2017-11-09 NOTE — LETTER
[address] [address]  [address] [facility]  Phone: [phone]  Fax: [phone]    Jarocho Adamson MD          November 9, 2017         Patient: Jaci Gann   MR Number: [id number]   YOB: 1960   Date of Visit: 11/9/2017       Dear Dr. Gurwinder Zamora: Thank you for referring Jaci Gann to me for a preoperative examination. Please find attached the history & physical examination from today's office visit. Labs/tests may be requested from the office/lab or reviewed via EPIC. If you have any questions, please do not hesitate to call me.              Sincerely,                             Jarocho Adamson MD

## 2017-11-10 ENCOUNTER — HOSPITAL ENCOUNTER (OUTPATIENT)
Dept: PREADMISSION TESTING | Age: 57
Discharge: HOME | End: 2017-11-10
Attending: ORTHOPAEDIC SURGERY | Admitting: ORTHOPAEDIC SURGERY

## 2017-11-10 VITALS
RESPIRATION RATE: 16 BRPM | TEMPERATURE: 97.3 F | DIASTOLIC BLOOD PRESSURE: 79 MMHG | OXYGEN SATURATION: 97 % | SYSTOLIC BLOOD PRESSURE: 126 MMHG | BODY MASS INDEX: 26.41 KG/M2 | HEART RATE: 72 BPM | HEIGHT: 72 IN | WEIGHT: 195 LBS

## 2017-11-10 LAB
ESTIMATED AVERAGE GLUCOSE: 174.3 MG/DL
GLUCOSE BLD-MCNC: 271 MG/DL (ref 70–99)
GLUCOSE BLD-MCNC: 283 MG/DL (ref 70–99)
HBA1C MFR BLD: 7.7 %
PERFORMED ON: ABNORMAL
PERFORMED ON: ABNORMAL

## 2017-11-10 RX ORDER — LABETALOL HYDROCHLORIDE 5 MG/ML
5 INJECTION, SOLUTION INTRAVENOUS EVERY 10 MIN PRN
Status: DISCONTINUED | OUTPATIENT
Start: 2017-11-10 | End: 2017-11-11 | Stop reason: HOSPADM

## 2017-11-10 RX ORDER — FENTANYL CITRATE 50 UG/ML
50 INJECTION, SOLUTION INTRAMUSCULAR; INTRAVENOUS EVERY 5 MIN PRN
Status: DISCONTINUED | OUTPATIENT
Start: 2017-11-10 | End: 2017-11-11 | Stop reason: HOSPADM

## 2017-11-10 RX ORDER — SODIUM CHLORIDE, SODIUM LACTATE, POTASSIUM CHLORIDE, CALCIUM CHLORIDE 600; 310; 30; 20 MG/100ML; MG/100ML; MG/100ML; MG/100ML
INJECTION, SOLUTION INTRAVENOUS CONTINUOUS
Status: DISCONTINUED | OUTPATIENT
Start: 2017-11-10 | End: 2017-11-11 | Stop reason: HOSPADM

## 2017-11-10 RX ORDER — HYDRALAZINE HYDROCHLORIDE 20 MG/ML
5 INJECTION INTRAMUSCULAR; INTRAVENOUS EVERY 10 MIN PRN
Status: DISCONTINUED | OUTPATIENT
Start: 2017-11-10 | End: 2017-11-11 | Stop reason: HOSPADM

## 2017-11-10 RX ORDER — FENTANYL CITRATE 50 UG/ML
25 INJECTION, SOLUTION INTRAMUSCULAR; INTRAVENOUS EVERY 5 MIN PRN
Status: DISCONTINUED | OUTPATIENT
Start: 2017-11-10 | End: 2017-11-11 | Stop reason: HOSPADM

## 2017-11-10 RX ORDER — OXYCODONE HYDROCHLORIDE AND ACETAMINOPHEN 5; 325 MG/1; MG/1
2 TABLET ORAL PRN
Status: ACTIVE | OUTPATIENT
Start: 2017-11-10 | End: 2017-11-10

## 2017-11-10 RX ORDER — ONDANSETRON 2 MG/ML
4 INJECTION INTRAMUSCULAR; INTRAVENOUS
Status: ACTIVE | OUTPATIENT
Start: 2017-11-10 | End: 2017-11-10

## 2017-11-10 RX ORDER — OXYCODONE HYDROCHLORIDE AND ACETAMINOPHEN 5; 325 MG/1; MG/1
1 TABLET ORAL PRN
Status: ACTIVE | OUTPATIENT
Start: 2017-11-10 | End: 2017-11-10

## 2017-11-10 RX ORDER — HYDROCODONE BITARTRATE AND ACETAMINOPHEN 5; 325 MG/1; MG/1
1-2 TABLET ORAL EVERY 6 HOURS PRN
Qty: 20 TABLET | Refills: 0 | Status: SHIPPED | OUTPATIENT
Start: 2017-11-10 | End: 2017-11-17

## 2017-11-10 RX ADMIN — SODIUM CHLORIDE, SODIUM LACTATE, POTASSIUM CHLORIDE, CALCIUM CHLORIDE: 600; 310; 30; 20 INJECTION, SOLUTION INTRAVENOUS at 07:30

## 2017-11-10 RX ADMIN — Medication 0.5 MG: at 12:10

## 2017-11-10 ASSESSMENT — PAIN - FUNCTIONAL ASSESSMENT: PAIN_FUNCTIONAL_ASSESSMENT: 0-10

## 2017-11-10 ASSESSMENT — PAIN DESCRIPTION - ORIENTATION: ORIENTATION: LEFT

## 2017-11-10 ASSESSMENT — PAIN DESCRIPTION - LOCATION: LOCATION: HAND

## 2017-11-10 ASSESSMENT — PAIN SCALES - GENERAL
PAINLEVEL_OUTOF10: 2
PAINLEVEL_OUTOF10: 7
PAINLEVEL_OUTOF10: 2

## 2017-11-10 ASSESSMENT — PAIN DESCRIPTION - PAIN TYPE: TYPE: SURGICAL PAIN

## 2017-11-10 ASSESSMENT — PAIN DESCRIPTION - DESCRIPTORS: DESCRIPTORS: ACHING

## 2017-11-10 NOTE — PROGRESS NOTES
Dr. Danial Manzo at bedside.  Prescription received for norco. Sent to outpatient pharmacy per patient request.

## 2017-11-10 NOTE — H&P
SDS History & Physical Update                                     (  Less than 30 days since last  full H & P )      Sole Jackson    Diagnosis: Closed displaced fractures of middle phalanx of left little finger and proximal phalanx of left ring finger   HPI / INDICATION / Manifestations: Presents this morning for operative fixation of left 4th and 5th fingers with Dr. Marii Mathews. Pt reports he fell down a flight of stairs after work on Tuesday- he was seen in the ER on the date of the injury. States he has severe pain in his left hand that keeps him up at night unless he takes pain medication. Reports some swelling to his left fingers and numbness at the base of his left 4th finger. Diagnosis # 2: Diabetes mellitus   Diagnosis # 3: HTN     Patient Active Problem List   Diagnosis    Urinary frequency    Diabetes mellitus ([facility] [address]    Elevated LDL cholesterol level    Essential hypertension, benign    Elevated bilirubin    Nonspecific abnormal results of liver function study    Urinary hesitancy    Benign prostatic hyperplasia    Penile discharge    Closed displaced fracture of proximal phalanx of left ring finger    Closed displaced fracture of middle phalanx of left little finger       Past Medical History:      Diagnosis Date    BPH (benign prostatic hypertrophy) 9/2/2015    Clostridium difficile infection 10/24/2016    Diabetes mellitus ([facility] [address] 2012    Elevated LDL cholesterol level 1/27/2015    Essential hypertension, benign 1/27/2015    S/P colonoscopy 2011    Nml per pt'. Medication List:  Not in a hospital admission.     Home Meds:    Current Outpatient Prescriptions:     HYDROcodone-acetaminophen (NORCO) 5-325 MG per tablet, Take 1 tablet by mouth every 6 hours as needed for Pain  Sedation precautions please., Disp: 12 tablet, Rfl: 0    nicotine (NICODERM CQ) 14 MG/24HR, Place 1 patch onto the skin every 24 hours, Disp: 30 patch, Rfl: 0    nicotine (NICODERM CQ) 14 MG/24HR, Place 1 patch onto the skin every 24 hours, Disp: 30 patch, Rfl: 0    aspirin 81 MG tablet, Take 81 mg by mouth daily, Disp: , Rfl:     Current Facility-Administered Medications:     ceFAZolin (ANCEF) 2 g in dextrose 3 % 50 mL IVPB (duplex), 2 g, Intravenous, Once, Gloria Gonzalez MD    lactated ringers infusion, , Intravenous, Continuous, Davion Bumps, DO    Allergies:  Ibuprofen and Shellfish-derived products    Lab Results   Component Value Date     11/09/2017    K 4.3 11/09/2017     11/09/2017    CO2 26 11/09/2017    BUN 16 11/09/2017    CREATININE 1.2 11/09/2017    GLUCOSE 301 11/09/2017    CALCIUM 9.2 11/09/2017     Lab Results   Component Value Date    POCGLU 366 10/27/2015     Lab Results   Component Value Date    WBC 7.9 11/09/2017    RBC 4.62 11/09/2017    HGB 14.3 11/09/2017    HCT 41.9 11/09/2017    MCV 90.7 11/09/2017    MCH 31.0 11/09/2017    MCHC 34.1 11/09/2017    RDW 12.6 11/09/2017     11/09/2017     GENERAL: Alert and cooperative, aware of today's planned procedure. HEENT: Supple, trachea midline. No lymphadenopathy. No bruits. Oropharynx is pink and moist with no obvious lesions or erythema. LUNGS:  Clear bilaterally. No wheezing or rhonchi. CV: Regular rate and rhythm. S1, S2, no murmurs/rubs/gallops. ABDOMEN: Soft, non-tender. No distention, bowel sounds are present. No appliances or piercings. SKIN: Warm and dry. Denies pressure ulcers or decubiti. EXTREMITIES:  Left upper extremity in splint- 4th and 5th fingers extended. Mild swelling noted in all left fingers. Sensation intact except for numbness at the base of 4th finger. NEUROLOGIC:  Grossly intact- clear speech, verbal responses are appropriate. PERRLA. Bilateral upper and lower extremity movements and sensation are intact. /63   Pulse 73   Temp 97.7 °F (36.5 °C) (Oral)   Resp 16   Ht 6' (1.829 m)   Wt 195 lb (88.5 kg)   SpO2 99%   BMI 26.45 kg/m²          Assessment:  This is a 59 y.o., male, nonsmoker with closed displaced fractures of middle phalanx of left little finger and proximal phalanx of left ring finger. Plan: Operative fixation of left ring finger proximal phalanx and left small finger middle phalanx fractures; including open reduction internal fixation vs percutaneous pinning fixation. Rehabilitation as directed by surgeon/therapist.  Continue health care monitoring/maintenance with PCP is advised. Consultations: Medicine, OT/PT//RT and other services are available and will be requested on a PRN basis. Additionally, the  existing/original H&P  has been requested, reviewed or otherwise discussed with this patient/family/guardian by me and there are no new clinical changes.      Aleja Fregoso, APRN  11/10/2017, 7:19 AM

## 2017-11-10 NOTE — PROCEDURES
intervention for  operative fixation of fractures. Risks, benefits and alternatives of the  procedure were discussed with risks included but not limited to infection  and bleeding, damage to nerves and blood vessels, need for additional  procedures, continued pain, malunion, nonunion, stiffness at fingers,  numbness and tingling as well as symptomatic hardware, need for hardware  removal, need for tenolysis or significant scarring. Risks of anesthesia  including stroke, heart attack, blood clot and death. Despite these risks,  the patient understood his options and like to proceed with surgery and  consent was obtained prior to surgery. DESCRIPTION OF PROCEDURE:  The patient was seen in the preoperative holding  area by the operating surgeon, left ring and small fingers marked by the  operating surgeon. Anesthesia also evaluate the patient, felt the patient  appropriate for general anesthesia. He was taken back to the operating  room and placed supine position on a regular table. All bony prominences  were well padded. SCDs applied to bilateral lower extremities. Hand table  was used to port left upper extremity. Prepped and draped the left upper  extremity in usual sterile fashion. A timeout was performed indicating the  left hand as the appropriate operative extremity. All were in agreement. A weight based dose of IV antibiotics was administered within one hour  prior to the procedure. We began the procedure by imaging the fractures  again, confirmation of unstable fracture pattern with displacement was  noted. The ring finger proximal phalanx was attempted with gentle  traction, manipulation. There was no overall improvement of the fracture  alignment with several attempts with closed reduction and therefore an  decision to proceed with open reduction was performed.   We proceeded with  plan for open reduction, dorsal midline incision over the proximal phalanx  was made using #15 blade through skin.  The neurovascular structures as  well as venous system was preserved as much as possible. We continue our  dissection down to the extensive tendon, extensive mechanism, extensive  tendon was cut and longitudinally midline and gently retracted. The  proximal phalanx and  long oblique fracture of the proximal phalanx was  then identified. It was gently manipulated and open and cleaned of clot  and hematoma. There was noted to be slip of the flexion tendon within the  fracture site, which was trimmed and reduced to prevent any soft tissue  preventing appropriate fracture reduction. The fracture was visualized and  periosteum as overall preserved with periosteum near the apices of the  fracture, gently removed using 15 blade. The fracture was cleaned of clot  and hematoma. Dental pick with gentle manipulation and traction was  performed on the fracture at this point. There was some comminution at the  fracture, but it would be amenable to independent leg screw fixation. It  was provisionally reduced and held in place using hemostat clamp. A single  0.45 K-wire was then placed across the fracture site for provisional  fixation. The overall length, alignment, rotation of fracture appeared to  be appropriate. With tenodesis and normal finger cascade with no evidence  of malrotation. At this point, we then placed 3 independent lag screws  perpendicular to fracture site. The first screw was placed centrally using  a AO technique first overdrilling the near cortex, drilling the far cortex  measuring with a depth gauge countersinking prior to this and placing screw  at the appropriate length. This was done   both proximally and distal to this screw with 2-0 Synthes cortical  nonlocking screws. There is excellent compression at the fracture with  length alignment and rotation maintained. X-rays three views of the left hand confirmed. Appropriate length,  alignment, rotation of the fracture clinically. There was no evidence of  malrotation or angulation. There was excellent fixation of the fracture  with no further comminution and no instability. After placement of 3 lag  screws, it was felt that the fracture was stable. The wound was copiously  irrigated, followed by repair of extensor tendon using 4-0 PDS suture. Normal tenodesis of the finger was noted at the end of the procedure. Skin  was then closed with interrupted 4-0 nylon suture. We then proceeded to fixation of the middle phalanx. It was a comminuted  transverse middle phalanx shaft fracture that was noted. Based on the size  of the bone as well as the location and comminution, it was felt that it  would be appropriate for percutaneous pin fixation using fluoroscopy for  _____ 0.45 K-wire was then placed percutaneously at the base of the middle  phalanx extraarticular, it was gently driven to the fracture site. A  second K-wire was placed on the opposite base of the middle phalanx and  again driven using fluoroscopy guidance. Through the fracture site and the  trajectory to cross each pin, but avoiding crossing at the fracture site  centrally. The fracture was then provisionally reduced using gentle  traction and manipulation between the fingers. The x-rays were also used to  inform fracture reduction both AP and lateral plane. Once this was felt to  be appropriate, each wire was then sequentially driven across the fracture  site and near the middle phalanx head taking care to remain extraocular to  the IP joint. Once each wire had been driven across, stability of the  fracture was provided. Appropriate length, alignment, rotation on AP and  lateral imaging was confirmed of the fracture. The pins were bent and cut  just outside of the skin and again images obtained. No evidence of  malalignment.   Clinically, there is no evidence of malrotation or  angulation of the small finger with normal tenodesis of all fingers  including the

## 2017-11-10 NOTE — PROGRESS NOTES
P[facility]U Transfer to [facility]    Vitals:    11/10/17 1230   BP: 114/67   Pulse: 80   Resp: 17   Temp: 98.2 °F (36.8 °C)   SpO2: 95%         Intake/Output Summary (Last 24 hours) at 11/10/17 1246  Last data filed at 11/10/17 1245   Gross per 24 hour   Intake             1178 ml   Output               10 ml   Net             1168 ml       Pain assessment:  present - adequately treated  Pain Level: 2    Patient transferred to care of LALITO RN.    11/10/2017 12:46 PM

## 2017-11-10 NOTE — BRIEF OP NOTE
Brief Postoperative Note    Sho Morales  YOB: 1960  [id number]    Pre-operative Diagnosis: 1. Left proximal phalanx ring finger fracture, displaced  2. Left small finger middle phalanx fracture, displaced    Post-operative Diagnosis: Same    Procedure: 1. Open reduction, internal fixation left ring finger proximal phalanx fracture  2. Closed reduction, percutaneous fixation left small finger middle phalanx fracture  3. Interpretation fluoroscopy 3 views left hand.      Anesthesia: General and Local    Surgeons/Assistants: Dimitri Ventura MD/Virginie DANIELSON     Estimated Blood Loss: 5ml    Complications: None immediate apparent    Specimens: Was Not Obtained    Findings: see fully dictated operative report    Electronically signed by Xi Aguilar MD on 11/10/2017 at 12:10 PM

## 2017-11-10 NOTE — PROGRESS NOTES
[facility] PACU Education and Care Plan Goals  The following items will be achieved upon completion of the patient's transfer or discharge from the PACU:    Post Operative Pain Management                                                                               [x] Patient will verbalize understanding of pain scale and pain management. [x] Patient achieves predetermined pain goal of 4   [x] Self reports a comfort level acceptable for discharge  [] Other     Fall Risk Potential  [x] Due to Perioperative medication administration  Additional Risk Identified:   [] Sensory deficit         [] Motor deficit         [] Balance problem         [] Home medication         [] Uses assistive device to ambulate    Goal(s) for fall prevention:  [x] Prevent fall or injury by calling for assistance with activity and use of siderails while hospitalized  [x] Prevent fall or injury by using assistance with activity after discharge. [x] Patient / Significant other verbalize understanding in use of any ordered assistive devices    Mobility Safety/ ADL  [x] Reach a functional mobility goal within limitations of the procedure. Infection Precautions                                                                                                            []Patient understands implementation of infection precautions (see [facility] Presurgical Instructions and SSI Prevention Handout)    Post operative Assessment and Care                                                             [x] Standards of care met as delineated by ASPAN.                                                               Discharge Education and Goals  [x]Patient voices understanding of PACU discharge criteria  [x]Outpatient / significant other voices understanding of home care and follow up procedures (See [facility] Procedure Discharge Instructions)  [x] Patient / significant other understanding of Special Needs:  [] Cooling device  []Wound Support Device  [] Crutches   []Drain    [] Billie Brewer   []Other  [] Inpatient / significant other understands the plan for transfer to the inpatient unit

## 2017-11-10 NOTE — ANESTHESIA POST-OP
Anesthesia Post-op Note    Patient: Corita Loop    Procedure(s) Performed:   1. Open reduction, internal fixation left ring finger proximal phalanx fracture  2. Closed reduction, percutaneous fixation left small finger middle phalanx fracture  3. Interpretation fluoroscopy 3 views left hand.     DOS: 11.10.17    Surgeon: Ned Pollack     Anesthesia type: general    Post-op assessment:  Anesthetic Problems: no   Last Vitals:    Vitals:    11/10/17 1259   BP: 126/79   Pulse: 72   Resp: 16   Temp: 97.3 °F (36.3 °C)   SpO2: 97%     Cardiovascular System Stable: yes  Respiratory Function: Airway Patent yes  ETT no  Ventilator no  Level of consciousness: awake, alert and oriented  Post-op pain: adequate analgesia  Hydration Adequate: yes  Nausea/Vomiting:no  Other Issues:

## 2017-11-14 ENCOUNTER — TELEPHONE (OUTPATIENT)
Dept: ORTHOPEDIC SURGERY | Age: 57
End: 2017-11-14

## 2017-11-20 ENCOUNTER — TELEPHONE (OUTPATIENT)
Dept: ORTHOPEDIC SURGERY | Age: 57
End: 2017-11-20

## 2017-11-20 DIAGNOSIS — S62.615A CLOSED DISPLACED FRACTURE OF PROXIMAL PHALANX OF LEFT RING FINGER, INITIAL ENCOUNTER: ICD-10-CM

## 2017-11-20 DIAGNOSIS — S62.627A CLOSED DISPLACED FRACTURE OF MIDDLE PHALANX OF LEFT LITTLE FINGER, INITIAL ENCOUNTER: ICD-10-CM

## 2017-11-20 NOTE — TELEPHONE ENCOUNTER
SPOKE WITH PATIENT TO INFORM HIM THAT HE WILL HAS OCCUPATIONAL THERAPY FOLLOWING HIS POST-OP APPT WITH DR Lester Harris. PATIENT IS UNDERSTANDING OF THIS.

## 2017-11-21 ENCOUNTER — OFFICE VISIT (OUTPATIENT)
Dept: ORTHOPEDIC SURGERY | Age: 57
End: 2017-11-21

## 2017-11-21 ENCOUNTER — HOSPITAL ENCOUNTER (OUTPATIENT)
Dept: OCCUPATIONAL THERAPY | Age: 57
Discharge: HOME | End: 2017-11-30
Admitting: ORTHOPAEDIC SURGERY

## 2017-11-21 VITALS — WEIGHT: 195.11 LBS | BODY MASS INDEX: 26.43 KG/M2 | HEIGHT: 72 IN

## 2017-11-21 DIAGNOSIS — S62.627A CLOSED DISPLACED FRACTURE OF MIDDLE PHALANX OF LEFT LITTLE FINGER, INITIAL ENCOUNTER: ICD-10-CM

## 2017-11-21 DIAGNOSIS — S62.615A CLOSED DISPLACED FRACTURE OF PROXIMAL PHALANX OF LEFT RING FINGER, INITIAL ENCOUNTER: ICD-10-CM

## 2017-11-21 PROCEDURE — L3807 WHFO W/O JOINTS PRE CST: HCPCS | Performed by: ORTHOPAEDIC SURGERY

## 2017-11-21 PROCEDURE — 73130 X-RAY EXAM OF HAND: CPT | Performed by: ORTHOPAEDIC SURGERY

## 2017-11-21 PROCEDURE — 99024 POSTOP FOLLOW-UP VISIT: CPT | Performed by: ORTHOPAEDIC SURGERY

## 2017-11-21 NOTE — FLOWSHEET NOTE
swelling/inflammation/restriction, improving soft tissue extensibility and allowing for proper ROM for normal function with self care, reaching, carrying, lifting, house/yardwork, driving/computer work  [] Comments:    ADL Training:  []  (49670) Provided self-care/home management training related to activities of daily living and compensatory training, and/or use of adaptive equipment   [] Comments:     Splinting:  [] Fabrication of:   [] (43534) Checkout for orthotic/prosthetic use, established patient   [] (45410) Orthotic management and training (fitting and assessment)  [x] Comments: Edema sleeve issued for L ring finger    Charges:  Timed Code Treatment Minutes: 30   Total Treatment Minutes: 50     [x] EVAL (LOW) 51541   [] OT Re-eval (66274)  [] EVAL (MOD) 21163   [] EVAL (HIGH) 53813       [x] Pedro (30890) x  2   [] LKSZN(66630)  [] NMR (43616) x      [] Estim (attended) (31715)   [] Manual ([id number]) x       [] US (19828)  [] TA (84061) x      [] Paraffin (80018)  [] ADL  (88 [phone] 60) x     [] Splint/L code:    [] Estim (unattended) 33 93 31)  [] Other:      Frequency/Duration:1-  2 days per week for 8 weeks        GOALS:  Short Term Goals: To be achieved in: 2 weeks  1. Independent in HEP and progression per patient tolerance, in order to prevent re-injury. 2. Patient will have a decrease in pain to facilitate improvement in movement, function, and ADLs as indicated by Functional Deficits.     Long Term Goals to be achieved in 8  Weeks ( 1/16/18), including patient directed goals to address identified performance deficits:  1) Pt to be independent in graded HEP progression with a good level of effort and compliance. 2) Pt to report a score of 25 % or less on the Quick DASH disability questionnaire for increased performance with carrying, moving, and handling objects.   3) Pt will demonstrate increased ROM to L digit Fingertips to [facility] </= 1 cm for improved  performance with shaving and gripping  4) Pt will

## 2017-11-21 NOTE — PLAN OF CARE
[address] [place] [address]  Phone [phone]      Fax [phone]      Occupational Therapy/Hand Therapy Certification  Dear Referring Practitioner: Jassi Colvin,     We had the pleasure of evaluating the following patient for occupational therapy services at [address] [place] [address]. A summary of our findings can be found in the initial assessment below. This includes our plan of care. If you have any questions or concerns regarding these findings, please do not hesitate to contact me at the office phone number checked above. Thank you for the referral.     Physician Signature:_______________________________Date:__________________  By signing above (or electronic signature), therapists plan is approved by physician      Patient: Rodrigo Monday   : 1960   MRN: [id number]  Referring Physician: Referring Practitioner: Jassi Colvin      Evaluation Date: 2017      Medical Diagnosis Information:  Diagnosis: R73.126V (ICD-10-CM) - Closed displaced fracture of proximal phalanx of left ring finger, initial encounter    Treatment Diagnosis: L Hand stiffness M25.642                  Insurance information: [place] [id number]   30  Date of Injury: 2 wks ago  Date of Surgery:11/10/17      Precautions/ Contra-indications:   Latex Allergy:  [x]No      []Yes  Pacemaker:  [x] No       [] Yes     Preferred Language for Healthcare:   [x]English       []other:      G-Codes:  OT G-codes  Functional Assessment Tool Used: Quick DASH  Score: 48%  Functional Limitation: Carrying, moving and handling objects  Carrying, Moving and Handling Objects Current Status (): At least 40 percent but less than 60 percent impaired, limited or restricted  Carrying, Moving and Handling Objects Goal Status ():  At least 20 percent but less than 40 percent impaired, limited or restricted    [x] Patient reported Pinch     MMT:            Observations (including splints, bandages, incisions, scars):   Pins intact L small middle phananx    Sensation: [] No reported deficits  [] Intact to light touch    [] [place] Tyson test completed, findings as noted:  [x] Other: numbness in tip or ring and small fingers    Palpation:     Functional Mobility/Transfers/Gait: [x] Independent - no significant gait deviations  [] Assistance needed   [] Assistive device used: Falls Risk Assessment (30 days):   [x] Falls Risk assessed and no intervention required. [] Falls Risk assessed and Patient requires intervention due to being higher risk   TUG score (>12s at risk):     [] Falls education provided, including      Review Of Systems (ROS): [x]Performed Review of systems (Integumentary, CardioPulmonary, Neurological) by intake and observation. Intake form has been scanned into medical record. Patient has been instructed to contact their primary care physician regarding ROS issues if not already being addressed at this time. ASSESSMENT:   This patient presents with signs and symptoms consistent with the medical diagnosis provided by the referring physician.      Impairments (physical, cognitive and/or psychosocial):  [x] Decreased mobility   [x] Weakness    [] Hypersensitivity   [x] Pain/tenderness   [] Edema/swelling   [x] Decreased coordination (fine/gross motor)   [] Impaired body mechanics  [] Sensory loss  [] Loss of balance   [] Other:      Performance Deficits (to be addressed in plan of care):   [] Bathing    [] Household Tasks (cooking/cleaning)   [] Dressing    [] Self Feeding   [x] Grooming - shaving   [] Work/Education   [] Functional Mobility   [] Sleeping/Rest   [] Toileting/Hygiene   [] Recreational Activities   [] Driving    [] Community/Social Participation   [x] Other:  Gripping and lifting    Rehab Potential:   [] Excellent [x] Good [] Fair  [] Poor     Barriers affecting rehab potential:  []Age    []Lack of Motivation   []Co-Morbidities  []Cognitive Function  []Environmental/home/work barriers  []Other: Tolerance of evaluation/treatment:    [] Excellent [x] Good [] Fair  [] Poor      PLAN OF CARE:  Interventions:   [x] Therapeutic Exercise [x] Therapeutic Activity    [x] Activities of Daily Living [x] Neuromuscular Re-education      [x] Patient Education  [x] Manual Therapy      [x] Modalities as needed, and not otherwise contraindicated, including: ultrasound,paraffin,moist heat/cold pack, electrical stimulation, contrast bath, iontophoresis  [x] Splinting - as needed    Frequency/Duration:1-  2 days per week for 8 weeks      GOALS:  Short Term Goals: To be achieved in: 2 weeks  1. Independent in HEP and progression per patient tolerance, in order to prevent re-injury. 2. Patient will have a decrease in pain to facilitate improvement in movement, function, and ADLs as indicated by Functional Deficits. Long Term Goals to be achieved in 8  Weeks ( 1/16/18), including patient directed goals to address identified performance deficits:  1) Pt to be independent in graded HEP progression with a good level of effort and compliance. 2) Pt to report a score of 25 % or less on the Quick DASH disability questionnaire for increased performance with carrying, moving, and handling objects. 3) Pt will demonstrate increased ROM to L digit Fingertips to [facility] </= 1 cm for improved  performance with shaving and gripping  4) Pt will demonstrate increased strength to L  within 10# of R for improved performance with lifting. 5) Pt will have a decrease in pain to 3-4/10 to facilitate improvement in  performance with gripping and lifting.         OCCUPATIONAL THERAPY EVALUATION COMPLEXITY JUSTIFICATION:    [] An occupational profile and medical/therapy history, which includes:   [x] a brief history including medical and/or therapy records relating to the     presenting problem   [] an expanded review of medical and/or therapy records and additional review     of physical, cognitive or psychosocial history related to current functional    performance   [] an extensive additional review of review of medical and/or therapy records   and physical, cognitive, or psychosocial history related to current    functional performance    [] An assessment that identifies performance deficits (relating to physical, cognitive, or psychosocial skills) that result in activity limitations and/or participation restrictions:   [x] 1-3 performance deficits   [] 3-5 performance deficits   [] 5 or more performance deficits    [] Clinical decision making of:   [x] low complexity, including analysis of occupational profile, data analysis from problem focused assessment, and consideration of a limited number of treatment options. No comorbidities affect occupational performance. No task modifications or assistance needed to complete evaluation. [] moderate complexity, including analysis of occupational profile, data analysis from detailed assessment and consideration of several treatment options. Comorbidities that affect occupational performance may be present. Minimal to moderate task modifications or assistance needed to complete assessment. [] high complexity, including analysis of occupational profile, analysis of data from comprehensive assessment and consideration of multiple treatment options. Multiple comorbidities present that affect occupational performance. Significant task modifications or assistance needed to complete assessment. Evaluation Code:  [x] Low Complexity EVAL 67222 (typically 30 minutes face to face)  [] Mod Complexity EVAL 05948 (typically 45 minutes face to face)  [] High Complexity EVAL 01828 (typically 60 minutes face to face)             Electronically signed by:   Lisset Muhammad OTR/L [address] [place] [address]  OT [id number]

## 2017-11-27 ENCOUNTER — TELEPHONE (OUTPATIENT)
Dept: ORTHOPEDIC SURGERY | Age: 57
End: 2017-11-27

## 2017-11-27 NOTE — PROGRESS NOTES
Chief Complaint:  Post-Op Check (left hand)      History of Present of Illness:  Mr. Yanique Menendez is a 64-year-old male presenting as a 1st postoperative visit for his left ring and small finger injuries. Procedure: 1. ORIF left ring finger proximal phalanx fracture  2. Close reduction percutaneous pin fixation left small finger middle phalanx fracture  Date of procedure: 11/10/2017    He presents today nearly 10 days status post surgery. He has been in a ulnar gutter splint for protection. Overall, minimal pain in his left hand, he denies any new numbness or tingling. No fevers or chills or other constitutional symptoms since his surgery. He is here today for his 1st evaluation. Examination:  General: Well-appearing, pleasant, no acute distress  Left hand:  Small finger pins in place with no evidence of drainage, erythema or loosening  Dorsal ring finger incision clean dry and intact with sutures in place, healed without drainage or erythema  No angulation or malrotation of small or ring fingers  Normal resting cascade of all fingers with normal tenodesis of all fingers including small and ring fingers  Active FDS, FDP, EDC, interossei of all fingers  Gross sensation intact radial and ulnar aspect of fingertips including small and ring fingers  Capillary refill brisk in all fingers    Radiology:     X-rays obtained and reviewed in office:  Views 3  Location left hand  Impression appropriate alignment of small finger middle phalanx fracture and ring finger proximal phalanx fracture with crossing pins in place small finger and screw fixation of ring finger. No evidence of malrotation or angulation.  Appropriate pin placement without evidence of obvious intra-articular penetration of pin, one pin it appears to be slightly volar to the cortex of middle phalanx on lateral image    Orders Placed This Encounter   Procedures    XR HAND LEFT (MIN 3 VIEWS)     09134     Order Specific Question:   Reason for exam:     Answer: with no use of his left hand

## 2017-11-27 NOTE — TELEPHONE ENCOUNTER
11/27/17  DME   - NOT COVERED - POLICY ONLY COVERS  PREVENTATIVE  SERVICES - DOES NOT COVER FRACTURES - PER YANET  11/27/17  NDS

## 2017-12-01 ENCOUNTER — HOSPITAL ENCOUNTER (OUTPATIENT)
Dept: PHYSICAL THERAPY | Age: 57
Discharge: HOME | End: 2017-12-31
Attending: ORTHOPAEDIC SURGERY | Admitting: ORTHOPAEDIC SURGERY

## 2017-12-06 ENCOUNTER — OFFICE VISIT (OUTPATIENT)
Dept: ORTHOPEDIC SURGERY | Age: 57
End: 2017-12-06

## 2017-12-06 ENCOUNTER — HOSPITAL ENCOUNTER (OUTPATIENT)
Dept: OCCUPATIONAL THERAPY | Age: 57
Discharge: HOME | End: 2017-12-06
Admitting: ORTHOPAEDIC SURGERY

## 2017-12-06 VITALS — WEIGHT: 195.11 LBS | BODY MASS INDEX: 26.43 KG/M2 | HEIGHT: 72 IN

## 2017-12-06 DIAGNOSIS — S62.627A CLOSED DISPLACED FRACTURE OF MIDDLE PHALANX OF LEFT LITTLE FINGER, INITIAL ENCOUNTER: ICD-10-CM

## 2017-12-06 DIAGNOSIS — S62.615A CLOSED DISPLACED FRACTURE OF PROXIMAL PHALANX OF LEFT RING FINGER, INITIAL ENCOUNTER: ICD-10-CM

## 2017-12-06 PROCEDURE — 99024 POSTOP FOLLOW-UP VISIT: CPT | Performed by: ORTHOPAEDIC SURGERY

## 2017-12-06 PROCEDURE — 73130 X-RAY EXAM OF HAND: CPT | Performed by: ORTHOPAEDIC SURGERY

## 2017-12-06 NOTE — FLOWSHEET NOTE
carrying, lifting, driving/computer work  [] Comments:    Home Exercise Program:    [x] (27684) Reviewed/Progressed HEP activities related to strengthening, flexibility, endurance, ROM of scapular, scapulothoracic and UE control with self care, reaching, carrying, lifting, house/yardwork, driving/computer work  [] (50462) Reviewed/Progressed HEP activities related to improving balance, coordination, kinesthetic sense, posture, motor skill, proprioception of scapular, scapulothoracic and UE control with self care, reaching, carrying, lifting, house/yardwork, driving/computer work    [x] Comments: hand outs, educations      Manual Treatments:  PROM / STM / Oscillations-Mobs:  G-I, II, III, IV (PA's, Inf., Post.)  [] (76370) Provided manual therapy to mobilize soft tissue/joints of cervical/CT, scapular GHJ and UE for the purpose of modulating pain, promoting relaxation,  increasing ROM, reducing/eliminating soft tissue swelling/inflammation/restriction, improving soft tissue extensibility and allowing for proper ROM for normal function with self care, reaching, carrying, lifting, house/yardwork, driving/computer work  [x] Comments:8' retrograde massage     ADL Training:  []  (90011) Provided self-care/home management training related to activities of daily living and compensatory training, and/or use of adaptive equipment   [] Comments:     Splinting:  [] Fabrication of:   [] [phone] for orthotic/prosthetic use, established patient   [] (44388) Orthotic management and training (fitting and assessment)  [x] Comments:Xspan issued for RF/SF    Charges:  Timed Code Treatment Minutes: 43   Total Treatment Minutes: 60     [] EVAL (LOW) 66706   [] OT Re-eval (52563)  [] EVAL (MOD) 36638   [] EVAL (HIGH) 79328       [x] Pedro (94280) x  2   [] QUEYX(51015)  [] NMR (02130) x      [] Estim (attended) (84320)   [x] Manual ([id number]) x  1    [] US (65727)  [] TA (62632) x      [] Paraffin (08468)  [] ADL  (98 649 24 60) x     [] Splint/L code:    [x] Estim (unattended) (94835)  [] Other:      Frequency/Duration:1-  2 days per week for 8 weeks        GOALS:  Short Term Goals: To be achieved in: 2 weeks  1. Independent in HEP and progression per patient tolerance, in order to prevent re-injury. 2. Patient will have a decrease in pain to facilitate improvement in movement, function, and ADLs as indicated by Functional Deficits.     Long Term Goals to be achieved in 8  Weeks ( 1/16/18), including patient directed goals to address identified performance deficits:  1) Pt to be independent in graded HEP progression with a good level of effort and compliance. 2) Pt to report a score of 25 % or less on the Quick DASH disability questionnaire for increased performance with carrying, moving, and handling objects. 3) Pt will demonstrate increased ROM to L digit Fingertips to [facility] </= 1 cm for improved  performance with shaving and gripping  4) Pt will demonstrate increased strength to L  within 10# of R for improved performance with lifting. 5) Pt will have a decrease in pain to 3-4/10 to facilitate improvement in  performance with gripping and lifting      Progression Towards Functional goals:  [x] Patient is progressing as expected towards functional goals listed. [] Progression is slowed due to complexities listed. [] Progression has been slowed due to co-morbidities. [] Plan just implemented, too soon to assess goals progression  [x] Other: Pins removed today. Edema present.      ASSESSMENT:  Patient had pins removed today    Treatment/Activity Tolerance:  [] Patient tolerated treatment well [] Patient limited by fatigue  [x] Patient limited by pain  [] Patient limited by other medical complications  [] Other:     Prognosis: [x] Good [] Fair  [] Poor    Patient Requires Follow-up: [x] Yes  [] No    PLAN: See eval  [x] Continue per plan of care [] Alter current plan (see comments)  [] Plan of care initiated [] Hold pending MD visit [] Discharge    Electronically signed by: Katherine Stewart Michael 85, OTR/L [id number]

## 2017-12-06 NOTE — PROGRESS NOTES
Chief Complaint:  Follow-up (LEFT HAND)      History of Present of Illness:  Mr. Miriam Cuellar is a 49-year-old male presenting as a repeat visit for his left ring and small finger injuries. Procedure: 1. ORIF left ring finger proximal phalanx fracture  2. Close reduction percutaneous pin fixation left small finger middle phalanx fracture  Date of procedure: 11/10/2017     he presents now nearly 1 month status post  left small and ring finger surgery. His pain has been minimal but persistent pain in his ring finger particularly. No fever or chills, no changes in the appearance of his fingers. Occasional tingling in his fingertips but no discrete numbness. He did attend one occupational therapy session for range of motion since her last visit. He continues to wear the brace for comfort    Examination:  General: Well-appearing, pleasant, no acute distress    Left hand:  Small finger pins in place with no evidence of drainage, erythema or loosening  Dorsal ring finger incision healed with diffuse minimal swelling small and ring fingers  No angulation or malrotation of small or ring fingers  Normal resting cascade of all fingers with normal tenodesis of all fingers including small and ring fingers  Range of motion small and ring finger with 5 cm pulp to palm distance, near full extension with PIP flexion contracture ring finger 10 to 15°  Active FDS, FDP, EDC, interossei of all fingers  Gross sensation intact radial and ulnar aspect of fingertips including small and ring fingers  Capillary refill brisk in all fingers    Radiology:     X-rays obtained and reviewed in office:  Views  3  Location  left hand  Impression  unchanged position of pins small finger and screw position of ring finger proximal phalanx. No change in alignment of middle phalanx small finger and proximal phalanx ring finger fractures with interval healing of both fractures noted.     Orders Placed This Encounter   Procedures    XR HAND LEFT (MIN 3 VIEWS) 05895     Order Specific Question:   Reason for exam:     Answer:   Hand Pain    [facility] OT - [facility] [place] Occup[facility]     Referral Priority:   Routine     Referral Type:   Eval and Treat     Referral Reason:   Specialty Services Required     Requested Specialty:   Occupational Therapy     Number of Visits Requested:   1       Impression:  Encounter Diagnoses   Name Primary?  Closed displaced fracture of proximal phalanx of left ring finger, initial encounter     Closed displaced fracture of middle phalanx of left little finger, initial encounter          Treatment Plan:  Postoperative treatment plan discussed. I discussed the patient and emphasize the importance of occupational therapy for finger and hand range of motion and edema control. He does have stiffness today and I think that with home exercises as well as occupational therapy guidance this can be improved. I think that he has enough feeling today that is appropriate for small finger pin removal.  Procedure:Using sterile technique, pins were gently grasped using needle  and removed from the left small finger without event or difficulty. The patient tolerated the procedure well. A sterile dressing was applied to the pin site wounds. Appropriate for the patient to shower in 48 hours after pin removal. No heavy lifting or gripping with the left hand.    Plan for occupational therapy for active range of motion, no forced passive range of motion, no lifting or strengthening  Follow-up 1 month for reevaluation, one-handed duty at work

## 2017-12-14 ENCOUNTER — HOSPITAL ENCOUNTER (OUTPATIENT)
Dept: OCCUPATIONAL THERAPY | Age: 57
Discharge: HOME | End: 2017-12-14
Admitting: ORTHOPAEDIC SURGERY

## 2017-12-14 NOTE — FLOWSHEET NOTE
[address] [place], [place] [address]ett [address] [address]  Phone [phone]      Fax [phone]    Hand Therapy Daily Treatment Note  Date:  2017    Patient: Moses Burger   : 1960   MRN: [id number]  Referring Physician: Referring Practitioner: Vel Banks Diagnosis Information:  Diagnosis: P97.408P (ICD-10-CM) - Closed displaced fracture of proximal phalanx of left ring finger, initial encounter    Treatment Diagnosis: L Hand stiffness M25.642                                         Insurance information: [address]  30  Date of Injury: 2 weeks ago  Date of Surgery:11/10/17      Visit # Insurance Allowable   3 30     Date of Patient follow up with Physician:     G-Codes:  OT G-codes  Functional Assessment Tool Used: Quick DASH  Score: 48%  Functional Limitation: Carrying, moving and handling objects  Carrying, Moving and Handling Objects Current Status (): At least 40 percent but less than 60 percent impaired, limited or restricted  Carrying, Moving and Handling Objects Goal Status (): At least 20 percent but less than 40 percent impaired, limited or restricted    Progress Note: []  Yes  [x]  No  Next due by: Visit #10      Latex Allergy:  [x]NO      []YES    Preferred Language for Healthcare:   [x]English       []other:    Pain level: patient reported decreased pain    SUBJECTIVE:    Patient stated he has been following HEP to a degree but needs to do more at home, stated he was still swollen and stiff. Background/Relevant Medical & Therapy History: Pt fell down steps when he slipped on some plastic.     RESTRICTIONS/PRECAUTIONS: Per physician report can now engage in aggressive AROM, no PROM for at least 1 more week.  edema control     OBJECTIVE:      Date: 17     Objective Measures:        RF MP  PIP   DIP     10/55  35/55  015    POST TX    35  0 POST TX  0/60 30/60  0/35       SF MP  PIP  DIP 0/55  10/60  10/35 0/60  0/60  20/46 0/75 5/60  24/40     Digits: tips to DPFC  IF  LF  RF 1  4  7    RF 3.5  SF 3.5                 Edema        RF  P1  PIP  P2 7.5  7.6  6.6 9  9  8 7.8  7.5  6     SF 6.5  7  6.2 With bandaid on  9  8.2  7 6.5  6.7  5.9                   Modalities: 11/21/17 12/6/17 12/14/17             HP and TENS   12' 15'                             Therapeutic Exercise & Activities:        AROM L digits except L small PIP  Blocking and composite  10X ea    Active flexion/ fisting of hand  Educated on HEP, including NO passive, working on active extension, active flexion,, active flexion with gentle blocking Active extension of RF/SF with hand flat on table    Active flexion of fingers to small foam block          Pt education  Regarding pin care and dressing change.   Applied adaptic , carlie  And coban to [facility] pin sites Education om HEP, edema reduction techniques  Education on use of home TENS unit with independent practice for checking carryover      Manual  8' Retrograde massage to RF scar 8' Retrograde massage to Rf/ SF scar with deep prep      Wound care  Gauze removed, 2 bandaids applied to SF for increasing ROM during therapy  Wounds appearing healed      Edema reduction  Exercises  XSPAN for Sf, RF  New XSPAN for Sf, RF     Therapeutic Activities    Pulling large pegs out of formboard with RF/SF     Yellow clip with RF/SF against palm to  small foam blocks x 6         Therapeutic Exercise and NMR:  [x] (52141) Provided verbal/tactile cueing for activities related to strengthening, flexibility, endurance, ROM  for improvements in scapular, scapulothoracic and UE control with self care, reaching, carrying, lifting, house/yardwork, driving/computer work.    [] (97218) Provided verbal/tactile cueing for activities related to improving balance, coordination, kinesthetic sense, posture, motor skill, proprioception  to assist with scapular, scapulothoracic and UE control with self care, reaching, carrying, lifting, house/yardwork, driving/computer work.   [] Comments:    Therapeutic Activities:    [x] (05525 or 52550) Provided verbal/tactile cueing for activities related to improving balance, coordination, kinesthetic sense, posture, motor skill, proprioception and motor activation to allow for proper function of scapular, scapulothoracic and UE control with self care, carrying, lifting, driving/computer work  [] Comments:    Home Exercise Program:    [x] (51213) Reviewed/Progressed HEP activities related to strengthening, flexibility, endurance, ROM of scapular, scapulothoracic and UE control with self care, reaching, carrying, lifting, house/yardwork, driving/computer work  [] (93989) Reviewed/Progressed HEP activities related to improving balance, coordination, kinesthetic sense, posture, motor skill, proprioception of scapular, scapulothoracic and UE control with self care, reaching, carrying, lifting, house/yardwork, driving/computer work    [x] Comments: progressed HEP      Manual Treatments:  PROM / STM / Oscillations-Mobs:  G-I, II, III, IV (PA's, Inf., Post.)  [x] (89103) Provided manual therapy to mobilize soft tissue/joints of cervical/CT, scapular GHJ and UE for the purpose of modulating pain, promoting relaxation,  increasing ROM, reducing/eliminating soft tissue swelling/inflammation/restriction, improving soft tissue extensibility and allowing for proper ROM for normal function with self care, reaching, carrying, lifting, house/yardwork, driving/computer work  [x] Comments:8' retrograde massage     ADL Training:  []  (32853) Provided self-care/home management training related to activities of daily living and compensatory training, and/or use of adaptive equipment   [] Comments:     Splinting:  [] Fabrication of:   [] (97385) Checkout for orthotic/prosthetic use, established patient   [] (75153) Orthotic management and training (fitting and assessment)  [x] Comments:Xspan issued for RF/SF    Charges:  Timed Code Treatment Minutes: 40   Total Treatment Minutes: 60     [] EVAL (LOW) 20992   [] OT Re-eval (95259)  [] EVAL (MOD) 74952   [] EVAL (HIGH) 64102       [x] Pedro (89012) x  1   [] EMSOI(38951)  [] NMR (73692) x      [] Estim (attended) (42479)   [x] Manual ([id number]) x  1    [] US (31789)  [x] TA (90113) x  1   [] Paraffin (67720)  [] ADL  (75371) x     [] Splint/L code:    [x] Estim (unattended) (17537)  [] Other:      Frequency/Duration:1-  2 days per week for 8 weeks        GOALS:  Short Term Goals: To be achieved in: 2 weeks  1. Independent in HEP and progression per patient tolerance, in order to prevent re-injury. 2. Patient will have a decrease in pain to facilitate improvement in movement, function, and ADLs as indicated by Functional Deficits.     Long Term Goals to be achieved in 8  Weeks ( 1/16/18), including patient directed goals to address identified performance deficits:  1) Pt to be independent in graded HEP progression with a good level of effort and compliance. 2) Pt to report a score of 25 % or less on the Quick DASH disability questionnaire for increased performance with carrying, moving, and handling objects. 3) Pt will demonstrate increased ROM to L digit Fingertips to [facility] </= 1 cm for improved  performance with shaving and gripping  4) Pt will demonstrate increased strength to L  within 10# of R for improved performance with lifting. 5) Pt will have a decrease in pain to 3-4/10 to facilitate improvement in  performance with gripping and lifting      Progression Towards Functional goals:  [x] Patient is progressing as expected towards functional goals listed. [] Progression is slowed due to complexities listed. [] Progression has been slowed due to co-morbidities. [] Plan just implemented, too soon to assess goals progression  [x] Other: Edema present.      ASSESSMENT:  Patient progressing well, increasing ROM, still presenting with swelling and decreased extension of RF/SF     Treatment/Activity Tolerance:  [x] Patient tolerated treatment well [x] Patient limited by fatigue  [] Patient limited by pain  [] Patient limited by other medical complications  [x] Other: swelling affecting motion   Prognosis: [x] Good [] Fair  [] Poor    Patient Requires Follow-up: [x] Yes  [] No    PLAN: See eval  [x] Continue per plan of care [] Alter current plan (see comments)  [] Plan of care initiated [] Hold pending MD visit [] Discharge    Electronically signed by: Katherine Torres Michael 87, OTR/L [id number]

## 2017-12-18 ENCOUNTER — TELEPHONE (OUTPATIENT)
Dept: ORTHOPEDIC SURGERY | Age: 57
End: 2017-12-18

## 2017-12-21 ENCOUNTER — TELEPHONE (OUTPATIENT)
Dept: ORTHOPEDIC SURGERY | Age: 57
End: 2017-12-21

## 2017-12-21 ENCOUNTER — HOSPITAL ENCOUNTER (OUTPATIENT)
Dept: OCCUPATIONAL THERAPY | Age: 57
Discharge: HOME | End: 2017-12-21
Admitting: ORTHOPAEDIC SURGERY

## 2017-12-21 RX ORDER — TRAMADOL HYDROCHLORIDE 50 MG/1
50 TABLET ORAL 2 TIMES DAILY PRN
Qty: 13 TABLET | Refills: 0 | Status: SHIPPED | OUTPATIENT
Start: 2017-12-21 | End: 2017-12-31

## 2017-12-21 NOTE — FLOWSHEET NOTE
[address] [place] [address]  Phone [phone]      Fax [phone]    Hand Therapy Daily Treatment Note  Date:  2017    Patient: Limmie Mcardle   : 1960   MRN: [id number]  Referring Physician: Referring Practitioner: Blanca Saab       Medical Diagnosis Information:  Diagnosis: N86.905S (ICD-10-CM) - Closed displaced fracture of proximal phalanx of left ring finger, initial encounter    Treatment Diagnosis: L Hand stiffness M25.642                                         Insurance information: [address]  30  Date of Injury: 2 weeks ago  Date of Surgery:11/10/17      Visit # Insurance Allowable   4 30     Date of Patient follow up with Physician:     G-Codes:  OT G-devora  Functional Assessment Tool Used: Quick DASH  Score: 48%  Functional Limitation: Carrying, moving and handling objects  Carrying, Moving and Handling Objects Current Status (): At least 40 percent but less than 60 percent impaired, limited or restricted  Carrying, Moving and Handling Objects Goal Status (): At least 20 percent but less than 40 percent impaired, limited or restricted    Progress Note: []  Yes  [x]  No  Next due by: Visit #10      Latex Allergy:  [x]NO      []YES    Preferred Language for Healthcare:   [x]English       []other:    Pain level: patient reported decreased pain    SUBJECTIVE:    Patient stated he has been following HEP to a degree but needs to do more at home, stated he was still swollen and stiff. Background/Relevant Medical & Therapy History: Pt fell down steps when he slipped on some plastic.     RESTRICTIONS/PRECAUTIONS: Per physician report can now engage in aggressive AROM, no PROM for at least 1 more week.  edema control     OBJECTIVE:      Date: 17     Objective Measures:        RF MP  PIP   DIP     10/55  35/55  0/15    POST TX    35/65  025 strengthening, flexibility, endurance, ROM  for improvements in scapular, scapulothoracic and UE control with self care, reaching, carrying, lifting, house/yardwork, driving/computer work. [x] (73898) Provided verbal/tactile cueing for activities related to improving balance, coordination, kinesthetic sense, posture, motor skill, proprioception  to assist with  scapular, scapulothoracic and UE control with self care, reaching, carrying, lifting, house/yardwork, driving/computer work.   [] Comments:    Therapeutic Activities:    [x] (92700 or 11660) Provided verbal/tactile cueing for activities related to improving balance, coordination, kinesthetic sense, posture, motor skill, proprioception and motor activation to allow for proper function of scapular, scapulothoracic and UE control with self care, carrying, lifting, driving/computer work  [] Comments:    Home Exercise Program:    [x] (62357) Reviewed/Progressed HEP activities related to strengthening, flexibility, endurance, ROM of scapular, scapulothoracic and UE control with self care, reaching, carrying, lifting, house/yardwork, driving/computer work  [] (02867) Reviewed/Progressed HEP activities related to improving balance, coordination, kinesthetic sense, posture, motor skill, proprioception of scapular, scapulothoracic and UE control with self care, reaching, carrying, lifting, house/yardwork, driving/computer work    [x] Comments: progressed HEP      Manual Treatments:  PROM / STM / Oscillations-Mobs:  G-I, II, III, IV (PA's, Inf., Post.)  [x] (81863) Provided manual therapy to mobilize soft tissue/joints of cervical/CT, scapular GHJ and UE for the purpose of modulating pain, promoting relaxation,  increasing ROM, reducing/eliminating soft tissue swelling/inflammation/restriction, improving soft tissue extensibility and allowing for proper ROM for normal function with self care, reaching, carrying, lifting, house/yardwork, driving/computer work  [x] Comments:8' retrograde massage , scar massage, added vibration    ADL Training:  []  (94095) Provided self-care/home management training related to activities of daily living and compensatory training, and/or use of adaptive equipment   [] Comments:     Splinting:  [] Fabrication of:   [] (74599) Checkout for orthotic/prosthetic use, established patient   [] (50726) Orthotic management and training (fitting and assessment)  [x] Comments:Xspan issued for RF/SF    Charges:  Timed Code Treatment Minutes: 42   Total Treatment Minutes: 70     [] EVAL (LOW) 42901   [] OT Re-eval (22745)  [] EVAL (MOD) 19953   [] EVAL (HIGH) 84518       [x] Pedro (15117) x  2   [] HSVNX(42720)  [] NMR (60556) x      [] Estim (attended) (29288)   [x] Manual ([id number]) x  1    [] US (15790)  [] TA (42341) x      [x] Paraffin (50611)  [] ADL  ([phone] [id number]) x     [] Splint/L code:    [x] Estim (unattended) (90761)  [] Other:      Frequency/Duration:1-  2 days per week for 8 weeks        GOALS:  Short Term Goals: To be achieved in: 2 weeks  1. Independent in HEP and progression per patient tolerance, in order to prevent re-injury. 2. Patient will have a decrease in pain to facilitate improvement in movement, function, and ADLs as indicated by Functional Deficits.     Long Term Goals to be achieved in 8  Weeks ( 1/16/18), including patient directed goals to address identified performance deficits:  1) Pt to be independent in graded HEP progression with a good level of effort and compliance. 2) Pt to report a score of 25 % or less on the Quick DASH disability questionnaire for increased performance with carrying, moving, and handling objects. 3) Pt will demonstrate increased ROM to L digit Fingertips to [facility] </= 1 cm for improved  performance with shaving and gripping  4) Pt will demonstrate increased strength to L  within 10# of R for improved performance with lifting.   5) Pt will have a decrease in pain to 3-4/10 to facilitate improvement in performance with gripping and lifting      Progression Towards Functional goals:  [x] Patient is progressing as expected towards functional goals listed. [] Progression is slowed due to complexities listed. [] Progression has been slowed due to co-morbidities. [] Plan just implemented, too soon to assess goals progression  [x] Other: Edema present. ASSESSMENT: Sever finger stiffness. Pt to decrease usage of splint at home. Flex loop provided to assist with increasing flex of fingers. Treatment/Activity Tolerance:  [] Patient tolerated treatment well [x] Patient limited by fatigue  [x] Patient limited by pain  [] Patient limited by other medical complications  [x] Other: scar adhesions affecting motion  Prognosis: [x] Good [] Fair  [] Poor    Patient Requires Follow-up: [x] Yes  [] No    PLAN: Gradually progress ROM and light strengthening  [x] Continue per plan of care [] Alter current plan (see comments)  [] Plan of care initiated [] Hold pending MD visit [] Discharge    Electronically signed by:  Eugenia Nation  OTR/L [address] [facility]  [place] [id number]

## 2018-01-01 ENCOUNTER — HOSPITAL ENCOUNTER (OUTPATIENT)
Dept: PHYSICAL THERAPY | Age: 58
Discharge: HOME | End: 2018-01-31
Attending: ORTHOPAEDIC SURGERY | Admitting: ORTHOPAEDIC SURGERY

## 2018-01-15 ENCOUNTER — OFFICE VISIT (OUTPATIENT)
Dept: ORTHOPEDIC SURGERY | Age: 58
End: 2018-01-15

## 2018-01-15 DIAGNOSIS — S62.615A CLOSED DISPLACED FRACTURE OF PROXIMAL PHALANX OF LEFT RING FINGER, INITIAL ENCOUNTER: Primary | ICD-10-CM

## 2018-01-15 DIAGNOSIS — S62.627A CLOSED DISPLACED FRACTURE OF MIDDLE PHALANX OF LEFT LITTLE FINGER, INITIAL ENCOUNTER: ICD-10-CM

## 2018-01-15 PROCEDURE — 99024 POSTOP FOLLOW-UP VISIT: CPT | Performed by: ORTHOPAEDIC SURGERY

## 2018-01-15 PROCEDURE — 73130 X-RAY EXAM OF HAND: CPT | Performed by: ORTHOPAEDIC SURGERY

## 2018-01-15 NOTE — PROGRESS NOTES
hand  Impression interval healing of left ring finger proximal phalanx fracture and left small finger middle phalanx fracture  Appropriate screw alignment and appropriate length alignment and rotation fractures. No additional osseous abnormalities    Orders Placed This Encounter   Procedures    XR HAND LEFT (MIN 3 VIEWS)     84312     Order Specific Question:   Reason for exam:     Answer:   Hand Pain    [facility] OT - [facility]     Referral Priority:   Routine     Referral Type:   Eval and Treat     Referral Reason:   Specialty Services Required     Requested Specialty:   Occupational Therapy     Number of Visits Requested:   1       Impression:  Encounter Diagnoses   Name Primary?  Closed displaced fracture of proximal phalanx of left ring finger, initial encounter Yes    Closed displaced fracture of middle phalanx of left little finger, initial encounter          Treatment Plan:  Mr. Fatou Duncan has developed stiffness in his left small and ring fingers now 9 weeks status post surgery. I emphasized to the patient today the importance of continue to work on both active and passive range of motion and I do not think that he has any restrictions this time guarded to active and passive range of motion. He does understand that he is likely to have some residual stiffness in the finger secondary to the injury and his difficulty with performing exercises postoperatively. I do not think that the protective orthosis is necessary at this point. I would like the patient to continue to attend occupational therapy to continue with range of motion as much as tolerated. 10 pound weight restriction lifting at work with left upper extremity, also use of forrest tape for left small and ring fingers may be beneficial to him during this time.   I would like to see him back in 3-4 weeks to see if he is making any progress with regards to his range of motion

## 2018-01-15 NOTE — LETTER
[facility]  [address]  [address] [address]  Phone: [phone]  Fax: [phone]    Desiree Mahajan MD        January 15, 2018     Patient: Tiffanie Ramirez   YOB: 1960   Date of Visit: 1/15/2018       To Whom It May Concern: It is my medical opinion that Tiffanie Ramirez may return to light duty immediately with the following restrictions: lifting/carrying not to exceed 10 lbs. .    If you have any questions or concerns, please don't hesitate to call.     Sincerely,          Desiree Mahajan MD

## 2018-01-31 ENCOUNTER — TELEPHONE (OUTPATIENT)
Dept: FAMILY MEDICINE CLINIC | Age: 58
End: 2018-01-31

## 2018-01-31 NOTE — TELEPHONE ENCOUNTER
[address]    PT wants to know the results from his most recent labs.       Last seen 11/9/2017    Please advise patient

## 2018-02-01 ENCOUNTER — HOSPITAL ENCOUNTER (OUTPATIENT)
Dept: OCCUPATIONAL THERAPY | Age: 58
Discharge: HOME | End: 2018-02-28
Attending: ORTHOPAEDIC SURGERY | Admitting: ORTHOPAEDIC SURGERY

## 2018-02-01 ENCOUNTER — OFFICE VISIT (OUTPATIENT)
Dept: FAMILY MEDICINE CLINIC | Age: 58
End: 2018-02-01

## 2018-02-01 VITALS
OXYGEN SATURATION: 98 % | DIASTOLIC BLOOD PRESSURE: 80 MMHG | SYSTOLIC BLOOD PRESSURE: 123 MMHG | BODY MASS INDEX: 26.95 KG/M2 | RESPIRATION RATE: 16 BRPM | HEART RATE: 82 BPM | WEIGHT: 199 LBS | HEIGHT: 72 IN | TEMPERATURE: 97.5 F

## 2018-02-01 DIAGNOSIS — E78.2 MIXED HYPERLIPIDEMIA: ICD-10-CM

## 2018-02-01 DIAGNOSIS — R35.0 URINARY FREQUENCY: ICD-10-CM

## 2018-02-01 DIAGNOSIS — R30.0 DYSURIA: ICD-10-CM

## 2018-02-01 DIAGNOSIS — N30.00 ACUTE CYSTITIS WITHOUT HEMATURIA: ICD-10-CM

## 2018-02-01 DIAGNOSIS — I10 ESSENTIAL HYPERTENSION, BENIGN: ICD-10-CM

## 2018-02-01 DIAGNOSIS — E78.00 ELEVATED LDL CHOLESTEROL LEVEL: ICD-10-CM

## 2018-02-01 DIAGNOSIS — R35.0 URINARY FREQUENCY: Primary | ICD-10-CM

## 2018-02-01 LAB
A/G RATIO: 1.7 (ref 1.1–2.2)
ALBUMIN SERPL-MCNC: 3.9 G/DL (ref 3.4–5)
ALP BLD-CCNC: 168 U/L (ref 40–129)
ALT SERPL-CCNC: 29 U/L (ref 10–40)
ANION GAP SERPL CALCULATED.3IONS-SCNC: 13 MMOL/L (ref 3–16)
AST SERPL-CCNC: 17 U/L (ref 15–37)
BILIRUB SERPL-MCNC: 1 MG/DL (ref 0–1)
BILIRUBIN URINE: NEGATIVE
BLOOD, URINE: NEGATIVE
BUN BLDV-MCNC: 14 MG/DL (ref 7–20)
CALCIUM SERPL-MCNC: 8.8 MG/DL (ref 8.3–10.6)
CHLORIDE BLD-SCNC: 101 MMOL/L (ref 99–110)
CHOLESTEROL, TOTAL: 229 MG/DL (ref 0–199)
CLARITY: CLEAR
CO2: 24 MMOL/L (ref 21–32)
COLOR: YELLOW
CREAT SERPL-MCNC: 0.9 MG/DL (ref 0.9–1.3)
CREATININE URINE: 108.6 MG/DL (ref 39–259)
GFR AFRICAN AMERICAN: >60
GFR NON-AFRICAN AMERICAN: >60
GLOBULIN: 2.3 G/DL
GLUCOSE BLD-MCNC: 248 MG/DL (ref 70–99)
GLUCOSE URINE: >=1000 MG/DL
HDLC SERPL-MCNC: 42 MG/DL (ref 40–60)
KETONES, URINE: NEGATIVE MG/DL
LDL CHOLESTEROL CALCULATED: 151 MG/DL
LEUKOCYTE ESTERASE, URINE: NEGATIVE
MICROALBUMIN UR-MCNC: <1.2 MG/DL
MICROALBUMIN/CREAT UR-RTO: NORMAL MG/G (ref 0–30)
MICROSCOPIC EXAMINATION: ABNORMAL
NITRITE, URINE: NEGATIVE
PH UA: 6
POTASSIUM SERPL-SCNC: 4.1 MMOL/L (ref 3.5–5.1)
PROTEIN UA: NEGATIVE MG/DL
SODIUM BLD-SCNC: 138 MMOL/L (ref 136–145)
SPECIFIC GRAVITY UA: >1.03
TOTAL PROTEIN: 6.2 G/DL (ref 6.4–8.2)
TRIGL SERPL-MCNC: 180 MG/DL (ref 0–150)
URINE TYPE: ABNORMAL
UROBILINOGEN, URINE: 0.2 E.U./DL
VLDLC SERPL CALC-MCNC: 36 MG/DL

## 2018-02-01 PROCEDURE — 99214 OFFICE O/P EST MOD 30 MIN: CPT | Performed by: FAMILY MEDICINE

## 2018-02-01 RX ORDER — PHENAZOPYRIDINE HYDROCHLORIDE 100 MG/1
100 TABLET, FILM COATED ORAL 3 TIMES DAILY PRN
Qty: 6 TABLET | Refills: 0 | Status: SHIPPED | OUTPATIENT
Start: 2018-02-01 | End: 2018-02-03

## 2018-02-01 RX ORDER — CIPROFLOXACIN 250 MG/1
250 TABLET, FILM COATED ORAL 2 TIMES DAILY
Qty: 10 TABLET | Refills: 0 | Status: SHIPPED | OUTPATIENT
Start: 2018-02-01 | End: 2018-02-06 | Stop reason: ALTCHOICE

## 2018-02-01 RX ORDER — PHENAZOPYRIDINE HYDROCHLORIDE 100 MG/1
100 TABLET, FILM COATED ORAL 3 TIMES DAILY PRN
Qty: 6 TABLET | Refills: 0 | Status: SHIPPED | OUTPATIENT
Start: 2018-02-01 | End: 2018-02-01 | Stop reason: SDUPTHER

## 2018-02-01 ASSESSMENT — ENCOUNTER SYMPTOMS
DIARRHEA: 0
ABDOMINAL DISTENTION: 0
ABDOMINAL PAIN: 0
NAUSEA: 0
CONSTIPATION: 0
BACK PAIN: 0
CHEST TIGHTNESS: 0
VOMITING: 0
WHEEZING: 0
STRIDOR: 0
RECTAL PAIN: 0
CHOKING: 0
APNEA: 0
SHORTNESS OF BREATH: 0
BLOOD IN STOOL: 0
ANAL BLEEDING: 0
COUGH: 0

## 2018-02-01 NOTE — PROGRESS NOTES
Subjective:      Patient ID: Sallie Verdugo is a 59 y.o. male. HPI  New problem:  Presenting w/14day hx of mild urinary frequency. Associated w/mild dysuria/urine cloudy. Improved by nothing. Worsened by nothing. Prior UTIs:Yes. Similar sx to prior UTI. No prior prostatitis hx. Denies abdo pain/abnml penile bleeding or discharge/fever/chills/low back pain/n-v/appetite decrease or loss/diarrhea/constipation/fever/hesitancy/dribbling. Allergies   Allergen Reactions    Ibuprofen Swelling     swelling    Shellfish-Derived Products        Current Outpatient Prescriptions on File Prior to Visit   Medication Sig Dispense Refill    HYDROcodone-acetaminophen (NORCO) 5-325 MG per tablet Take 1 tablet by mouth every 6 hours as needed for Pain .  nicotine (NICODERM CQ) 14 MG/24HR Place 1 patch onto the skin every 24 hours 30 patch 0    nicotine (NICODERM CQ) 14 MG/24HR Place 1 patch onto the skin every 24 hours 30 patch 0    aspirin 81 MG tablet Take 81 mg by mouth daily       No current facility-administered medications on file prior to visit. Past Medical History:   Diagnosis Date    BPH (benign prostatic hypertrophy) 9/2/2015    Clostridium difficile infection 10/24/2016    Diabetes mellitus ([facility] [address] 2012    Elevated LDL cholesterol level 1/27/2015    Essential hypertension, benign 1/27/2015    S/P colonoscopy 2011    Nml per pt'. Social History   Substance Use Topics    Smoking status: Current Some Day Smoker     Packs/day: 0.25     Years: 10.00     Types: Cigarettes    Smokeless tobacco: Never Used    Alcohol use 0.0 oz/week      Comment: occasionally     History   Drug Use No     Comment: 5-6 years ago            Review of Systems   Constitutional: Negative for activity change, appetite change, chills, diaphoresis, fatigue, fever and unexpected weight change. Respiratory: Negative for apnea, cough, choking, chest tightness, shortness of breath, wheezing and stridor. Cardiovascular: Negative for chest pain, palpitations and leg swelling. Gastrointestinal: Negative for abdominal distention, abdominal pain, anal bleeding, blood in stool, constipation, diarrhea, nausea, rectal pain and vomiting. Endocrine: Negative for cold intolerance, heat intolerance, polydipsia, polyphagia and polyuria. Genitourinary: Positive for dysuria and frequency. Negative for decreased urine volume, difficulty urinating, discharge, enuresis, flank pain, genital sores, hematuria, penile pain, penile swelling, scrotal swelling, testicular pain and urgency. Musculoskeletal: Negative for back pain. Skin: Negative for pallor, rash and wound. Neurological: Negative for dizziness and light-headedness. Hematological: Negative for adenopathy. Objective:   Physical Exam   Constitutional: He is oriented to person, place, and time. Vital signs are normal. He appears well-developed and well-nourished. He is cooperative. He does not have a sickly appearance. No distress. HENT:   Nose: Nose normal.   Mouth/Throat: Uvula is midline, oropharynx is clear and moist and mucous membranes are normal.   Hearing intact to nml conversation   Eyes: Conjunctivae, EOM and lids are normal. Pupils are equal, round, and reactive to light. Neck: Trachea normal and normal range of motion. Neck supple. Cardiovascular: Normal rate, regular rhythm, normal heart sounds, intact distal pulses and normal pulses. Pulmonary/Chest: Effort normal and breath sounds normal.   CTAB,good AE bilaterally   Abdominal: Soft. Normal appearance and bowel sounds are normal. He exhibits no distension and no mass. There is no splenomegaly or hepatomegaly. There is no tenderness. There is no rigidity, no rebound, no guarding, no CVA tenderness, no tenderness at McBurney's point and negative Layton's sign. Lymphadenopathy:     He has no cervical adenopathy. Neurological: He is alert and oriented to person, place, and time.

## 2018-02-02 LAB
C. TRACHOMATIS DNA ,URINE: NEGATIVE
ESTIMATED AVERAGE GLUCOSE: 234.6 MG/DL
HBA1C MFR BLD: 9.8 %
N. GONORRHOEAE DNA, URINE: NEGATIVE

## 2018-02-03 LAB
ORGANISM: ABNORMAL
URINE CULTURE, ROUTINE: ABNORMAL
URINE CULTURE, ROUTINE: ABNORMAL

## 2018-02-06 ENCOUNTER — TELEPHONE (OUTPATIENT)
Dept: FAMILY MEDICINE CLINIC | Age: 58
End: 2018-02-06

## 2018-02-06 RX ORDER — AMOXICILLIN 500 MG/1
TABLET, FILM COATED ORAL
Qty: 14 TABLET | Refills: 0 | Status: SHIPPED | OUTPATIENT
Start: 2018-02-06 | End: 2018-04-11 | Stop reason: CLARIF

## 2018-04-11 ENCOUNTER — OFFICE VISIT (OUTPATIENT)
Dept: FAMILY MEDICINE CLINIC | Age: 58
End: 2018-04-11

## 2018-04-11 VITALS
RESPIRATION RATE: 16 BRPM | TEMPERATURE: 98.2 F | WEIGHT: 192.3 LBS | BODY MASS INDEX: 26.05 KG/M2 | HEIGHT: 72 IN | OXYGEN SATURATION: 98 % | SYSTOLIC BLOOD PRESSURE: 108 MMHG | DIASTOLIC BLOOD PRESSURE: 72 MMHG | HEART RATE: 79 BPM

## 2018-04-11 DIAGNOSIS — I10 ESSENTIAL HYPERTENSION, BENIGN: ICD-10-CM

## 2018-04-11 DIAGNOSIS — E66.3 OVERWEIGHT: ICD-10-CM

## 2018-04-11 DIAGNOSIS — R74.8 ELEVATED ALKALINE PHOSPHATASE LEVEL: ICD-10-CM

## 2018-04-11 DIAGNOSIS — F17.200 TOBACCO USE DISORDER: ICD-10-CM

## 2018-04-11 DIAGNOSIS — Z12.11 COLON CANCER SCREENING: ICD-10-CM

## 2018-04-11 DIAGNOSIS — E78.2 MIXED HYPERLIPIDEMIA: ICD-10-CM

## 2018-04-11 PROCEDURE — G8419 CALC BMI OUT NRM PARAM NOF/U: HCPCS | Performed by: FAMILY MEDICINE

## 2018-04-11 PROCEDURE — G8427 DOCREV CUR MEDS BY ELIG CLIN: HCPCS | Performed by: FAMILY MEDICINE

## 2018-04-11 PROCEDURE — 2022F DILAT RTA XM EVC RTNOPTHY: CPT | Performed by: FAMILY MEDICINE

## 2018-04-11 PROCEDURE — 99214 OFFICE O/P EST MOD 30 MIN: CPT | Performed by: FAMILY MEDICINE

## 2018-04-11 PROCEDURE — 3046F HEMOGLOBIN A1C LEVEL >9.0%: CPT | Performed by: FAMILY MEDICINE

## 2018-04-11 PROCEDURE — 4004F PT TOBACCO SCREEN RCVD TLK: CPT | Performed by: FAMILY MEDICINE

## 2018-04-11 PROCEDURE — 3017F COLORECTAL CA SCREEN DOC REV: CPT | Performed by: FAMILY MEDICINE

## 2018-04-11 RX ORDER — LANCETS 30 GAUGE
EACH MISCELLANEOUS
Qty: 100 EACH | Refills: 2 | Status: SHIPPED | OUTPATIENT
Start: 2018-04-11 | End: 2019-09-05 | Stop reason: CLARIF

## 2018-04-11 RX ORDER — BLOOD-GLUCOSE METER
1 KIT MISCELLANEOUS ONCE
Qty: 1 KIT | Refills: 0 | Status: SHIPPED | OUTPATIENT
Start: 2018-04-11 | End: 2019-09-05 | Stop reason: CLARIF

## 2018-04-11 RX ORDER — ATORVASTATIN CALCIUM 20 MG/1
20 TABLET, FILM COATED ORAL EVERY EVENING
Qty: 30 TABLET | Refills: 0 | Status: SHIPPED | OUTPATIENT
Start: 2018-04-11 | End: 2018-05-09 | Stop reason: SDUPTHER

## 2018-04-11 RX ORDER — GLUCOSAMINE HCL/CHONDROITIN SU 500-400 MG
CAPSULE ORAL
Qty: 100 STRIP | Refills: 1 | Status: SHIPPED | OUTPATIENT
Start: 2018-04-11 | End: 2019-09-05 | Stop reason: CLARIF

## 2018-04-11 ASSESSMENT — ENCOUNTER SYMPTOMS
SHORTNESS OF BREATH: 0
ABDOMINAL DISTENTION: 0
COUGH: 0
VOMITING: 0
WHEEZING: 0
BLOOD IN STOOL: 0
DIARRHEA: 0
CONSTIPATION: 0
ANAL BLEEDING: 0
RECTAL PAIN: 0
STRIDOR: 0
CHOKING: 0
CHEST TIGHTNESS: 0
APNEA: 0
ABDOMINAL PAIN: 0
NAUSEA: 0

## 2018-04-11 ASSESSMENT — PATIENT HEALTH QUESTIONNAIRE - PHQ9
1. LITTLE INTEREST OR PLEASURE IN DOING THINGS: 0
2. FEELING DOWN, DEPRESSED OR HOPELESS: 0
SUM OF ALL RESPONSES TO PHQ QUESTIONS 1-9: 0
SUM OF ALL RESPONSES TO PHQ9 QUESTIONS 1 & 2: 0

## 2018-05-09 DIAGNOSIS — E78.2 MIXED HYPERLIPIDEMIA: ICD-10-CM

## 2018-05-09 RX ORDER — ATORVASTATIN CALCIUM 20 MG/1
20 TABLET, FILM COATED ORAL EVERY EVENING
Qty: 30 TABLET | Refills: 1 | Status: SHIPPED | OUTPATIENT
Start: 2018-05-09 | End: 2019-08-12

## 2018-12-21 ENCOUNTER — HOSPITAL ENCOUNTER (EMERGENCY)
Age: 58
Discharge: HOME | End: 2018-12-21
Attending: EMERGENCY MEDICINE
Payer: COMMERCIAL

## 2018-12-21 VITALS
WEIGHT: 190 LBS | HEIGHT: 72 IN | OXYGEN SATURATION: 98 % | DIASTOLIC BLOOD PRESSURE: 81 MMHG | RESPIRATION RATE: 16 BRPM | TEMPERATURE: 98.6 F | HEART RATE: 88 BPM | SYSTOLIC BLOOD PRESSURE: 130 MMHG | BODY MASS INDEX: 25.73 KG/M2

## 2018-12-21 DIAGNOSIS — Z20.2 EXPOSURE TO TRICHOMONAS: Primary | ICD-10-CM

## 2018-12-21 LAB
BILIRUBIN URINE: NEGATIVE
BLOOD, URINE: NEGATIVE
CLARITY: CLEAR
COLOR: YELLOW
GLUCOSE URINE: >=1000 MG/DL
KETONES, URINE: NEGATIVE MG/DL
LEUKOCYTE ESTERASE, URINE: NEGATIVE
MICROSCOPIC EXAMINATION: ABNORMAL
NITRITE, URINE: NEGATIVE
PH UA: 6
PROTEIN UA: NEGATIVE MG/DL
SPECIFIC GRAVITY UA: 1.02
URINE REFLEX TO CULTURE: ABNORMAL
URINE TYPE: ABNORMAL
UROBILINOGEN, URINE: 0.2 E.U./DL

## 2018-12-21 PROCEDURE — 87591 N.GONORRHOEAE DNA AMP PROB: CPT

## 2018-12-21 PROCEDURE — 81003 URINALYSIS AUTO W/O SCOPE: CPT

## 2018-12-21 PROCEDURE — 87491 CHLMYD TRACH DNA AMP PROBE: CPT

## 2018-12-21 PROCEDURE — 99283 EMERGENCY DEPT VISIT LOW MDM: CPT

## 2018-12-21 PROCEDURE — 86780 TREPONEMA PALLIDUM: CPT

## 2018-12-21 PROCEDURE — 96372 THER/PROPH/DIAG INJ SC/IM: CPT

## 2018-12-21 PROCEDURE — 6370000000 HC RX 637 (ALT 250 FOR IP): Performed by: EMERGENCY MEDICINE

## 2018-12-21 PROCEDURE — 6360000002 HC RX W HCPCS: Performed by: EMERGENCY MEDICINE

## 2018-12-21 RX ORDER — CEFTRIAXONE SODIUM 250 MG/1
250 INJECTION, POWDER, FOR SOLUTION INTRAMUSCULAR; INTRAVENOUS ONCE
Status: COMPLETED | OUTPATIENT
Start: 2018-12-21 | End: 2018-12-21

## 2018-12-21 RX ORDER — AZITHROMYCIN 250 MG/1
1000 TABLET, FILM COATED ORAL ONCE
Status: COMPLETED | OUTPATIENT
Start: 2018-12-21 | End: 2018-12-21

## 2018-12-21 RX ORDER — METRONIDAZOLE 500 MG/1
2000 TABLET ORAL ONCE
Status: COMPLETED | OUTPATIENT
Start: 2018-12-21 | End: 2018-12-21

## 2018-12-21 RX ADMIN — METRONIDAZOLE 2000 MG: 500 TABLET ORAL at 17:26

## 2018-12-21 RX ADMIN — CEFTRIAXONE SODIUM 250 MG: 250 INJECTION, POWDER, FOR SOLUTION INTRAMUSCULAR; INTRAVENOUS at 17:26

## 2018-12-21 RX ADMIN — AZITHROMYCIN 1000 MG: 250 TABLET, FILM COATED ORAL at 17:26

## 2018-12-21 NOTE — ED NOTES
Patient given discharge instructions with return verbalization. Emphasis on f/u, to return with worsening s/s. Pt ambulated to lobby with steady gait.      Cindy Donnelly RN  12/21/18 3547

## 2018-12-21 NOTE — ED PROVIDER NOTES
deficits. Answers questions/follows commands appropriately. ED COURSE/MDM  Nursing notes reviewed. Pt was given the following medications or treatments in the ED: Patient was seen examined and will be treated for Trichomonas as well as other possible sexually transmitted diseases. He will have a blood test for syphilis as well. We talked about safe sex and again I understand he was wearing a condom but it came off exposing him. Clinical Impression  Based on the presenting complaint, history, and physical exam, multiple diagnoses were considered. Exam and workup here most c/w:  1. Exposure to trichomonas        I discussed with Letty Langford the results of evaluation in the ED, diagnosis, care, and prognosis. The plan is to discharge to home. Patient is in agreement with plan and questions have been answered. I also discussed with Letty Villegass the reasons which may require a return visit and the importance of follow-up care. The patient is well-appearing, nontoxic, and improved at the time of discharge. Patient agrees to call to arrange follow-up care as directed. Letty Hopes understands to return immediately for worsening/change in symptoms. Patient will be started on the following medications from the ED:  New Prescriptions    No medications on file         Disposition  Pt is discharged in stable condition.     Disposition Vitals:  /81   Pulse 88   Temp 98.6 °F (37 °C)   Resp 16   Ht 6' (1.829 m)   Wt 86.2 kg (190 lb)   SpO2 98%   BMI 25.77 kg/m²           Vicky Armin, DO  12/21/18 800 Juan Keri, DO  12/21/18 [id number]

## 2018-12-22 LAB — TOTAL SYPHILLIS IGG/IGM: NORMAL

## 2018-12-26 LAB
C. TRACHOMATIS DNA ,URINE: NEGATIVE
N. GONORRHOEAE DNA, URINE: NEGATIVE

## 2019-08-12 ENCOUNTER — HOSPITAL ENCOUNTER (EMERGENCY)
Age: 59
Discharge: HOME | End: 2019-08-12
Attending: EMERGENCY MEDICINE
Payer: COMMERCIAL

## 2019-08-12 ENCOUNTER — APPOINTMENT (OUTPATIENT)
Dept: CT IMAGING | Age: 59
End: 2019-08-12
Payer: COMMERCIAL

## 2019-08-12 VITALS
DIASTOLIC BLOOD PRESSURE: 94 MMHG | WEIGHT: 193.1 LBS | SYSTOLIC BLOOD PRESSURE: 108 MMHG | HEIGHT: 72 IN | RESPIRATION RATE: 18 BRPM | OXYGEN SATURATION: 100 % | TEMPERATURE: 98.1 F | HEART RATE: 64 BPM | BODY MASS INDEX: 26.15 KG/M2

## 2019-08-12 DIAGNOSIS — R11.2 NON-INTRACTABLE VOMITING WITH NAUSEA, UNSPECIFIED VOMITING TYPE: ICD-10-CM

## 2019-08-12 DIAGNOSIS — R10.84 GENERALIZED ABDOMINAL PAIN: Primary | ICD-10-CM

## 2019-08-12 LAB
A/G RATIO: 1.5 (ref 1.1–2.2)
ALBUMIN SERPL-MCNC: 4.2 G/DL (ref 3.4–5)
ALP BLD-CCNC: 187 U/L (ref 40–129)
ALT SERPL-CCNC: 11 U/L (ref 10–40)
ANION GAP SERPL CALCULATED.3IONS-SCNC: 12 MMOL/L (ref 3–16)
AST SERPL-CCNC: 13 U/L (ref 15–37)
BASOPHILS ABSOLUTE: 0.1 K/UL (ref 0–0.2)
BASOPHILS RELATIVE PERCENT: 0.4 %
BILIRUB SERPL-MCNC: 0.7 MG/DL (ref 0–1)
BUN BLDV-MCNC: 16 MG/DL (ref 7–20)
CALCIUM SERPL-MCNC: 9.8 MG/DL (ref 8.3–10.6)
CHLORIDE BLD-SCNC: 103 MMOL/L (ref 99–110)
CO2: 23 MMOL/L (ref 21–32)
CREAT SERPL-MCNC: 1.2 MG/DL (ref 0.8–1.3)
EKG ATRIAL RATE: 62 BPM
EKG DIAGNOSIS: NORMAL
EKG P AXIS: 38 DEGREES
EKG P-R INTERVAL: 146 MS
EKG Q-T INTERVAL: 388 MS
EKG QRS DURATION: 88 MS
EKG QTC CALCULATION (BAZETT): 393 MS
EKG R AXIS: 87 DEGREES
EKG T AXIS: 69 DEGREES
EKG VENTRICULAR RATE: 62 BPM
EOSINOPHILS ABSOLUTE: 0.2 K/UL (ref 0–0.6)
EOSINOPHILS RELATIVE PERCENT: 1.1 %
GFR AFRICAN AMERICAN: >60
GFR NON-AFRICAN AMERICAN: >60
GLOBULIN: 2.8 G/DL
GLUCOSE BLD-MCNC: 268 MG/DL (ref 70–99)
HCT VFR BLD CALC: 43.8 % (ref 40.5–52.5)
HEMOGLOBIN: 14.3 G/DL (ref 13.5–17.5)
LIPASE: 58 U/L (ref 13–60)
LYMPHOCYTES ABSOLUTE: 2.3 K/UL (ref 1–5.1)
LYMPHOCYTES RELATIVE PERCENT: 16.2 %
MCH RBC QN AUTO: 30.3 PG (ref 26–34)
MCHC RBC AUTO-ENTMCNC: 32.7 G/DL (ref 31–36)
MCV RBC AUTO: 92.7 FL (ref 80–100)
MONOCYTES ABSOLUTE: 0.7 K/UL (ref 0–1.3)
MONOCYTES RELATIVE PERCENT: 5.3 %
NEUTROPHILS ABSOLUTE: 10.9 K/UL (ref 1.7–7.7)
NEUTROPHILS RELATIVE PERCENT: 77 %
PDW BLD-RTO: 12.9 % (ref 12.4–15.4)
PLATELET # BLD: 244 K/UL (ref 135–450)
PMV BLD AUTO: 8.2 FL (ref 5–10.5)
POTASSIUM SERPL-SCNC: 4.5 MMOL/L (ref 3.5–5.1)
RBC # BLD: 4.72 M/UL (ref 4.2–5.9)
SODIUM BLD-SCNC: 138 MMOL/L (ref 136–145)
TOTAL PROTEIN: 7 G/DL (ref 6.4–8.2)
WBC # BLD: 14.2 K/UL (ref 4–11)

## 2019-08-12 PROCEDURE — 96374 THER/PROPH/DIAG INJ IV PUSH: CPT

## 2019-08-12 PROCEDURE — 93005 ELECTROCARDIOGRAM TRACING: CPT | Performed by: EMERGENCY MEDICINE

## 2019-08-12 PROCEDURE — 80053 COMPREHEN METABOLIC PANEL: CPT

## 2019-08-12 PROCEDURE — 2580000003 HC RX 258: Performed by: EMERGENCY MEDICINE

## 2019-08-12 PROCEDURE — 96375 TX/PRO/DX INJ NEW DRUG ADDON: CPT

## 2019-08-12 PROCEDURE — 96372 THER/PROPH/DIAG INJ SC/IM: CPT

## 2019-08-12 PROCEDURE — 93010 ELECTROCARDIOGRAM REPORT: CPT | Performed by: INTERNAL MEDICINE

## 2019-08-12 PROCEDURE — 99284 EMERGENCY DEPT VISIT MOD MDM: CPT

## 2019-08-12 PROCEDURE — 85025 COMPLETE CBC W/AUTO DIFF WBC: CPT

## 2019-08-12 PROCEDURE — 2500000003 HC RX 250 WO HCPCS: Performed by: EMERGENCY MEDICINE

## 2019-08-12 PROCEDURE — 83690 ASSAY OF LIPASE: CPT

## 2019-08-12 PROCEDURE — 96361 HYDRATE IV INFUSION ADD-ON: CPT

## 2019-08-12 PROCEDURE — 6360000002 HC RX W HCPCS: Performed by: EMERGENCY MEDICINE

## 2019-08-12 PROCEDURE — 74176 CT ABD & PELVIS W/O CONTRAST: CPT

## 2019-08-12 RX ORDER — MORPHINE SULFATE 2 MG/ML
INJECTION, SOLUTION INTRAMUSCULAR; INTRAVENOUS
Status: DISCONTINUED
Start: 2019-08-12 | End: 2019-08-12 | Stop reason: HOSPADM

## 2019-08-12 RX ORDER — MORPHINE SULFATE 4 MG/ML
4 INJECTION, SOLUTION INTRAMUSCULAR; INTRAVENOUS ONCE
Status: COMPLETED | OUTPATIENT
Start: 2019-08-12 | End: 2019-08-12

## 2019-08-12 RX ORDER — 0.9 % SODIUM CHLORIDE 0.9 %
1000 INTRAVENOUS SOLUTION INTRAVENOUS ONCE
Status: COMPLETED | OUTPATIENT
Start: 2019-08-12 | End: 2019-08-12

## 2019-08-12 RX ORDER — DICYCLOMINE HYDROCHLORIDE 10 MG/1
10 CAPSULE ORAL EVERY 6 HOURS PRN
Qty: 10 CAPSULE | Refills: 0 | Status: SHIPPED | OUTPATIENT
Start: 2019-08-12 | End: 2019-09-05 | Stop reason: CLARIF

## 2019-08-12 RX ORDER — DICYCLOMINE HYDROCHLORIDE 10 MG/ML
20 INJECTION INTRAMUSCULAR ONCE
Status: COMPLETED | OUTPATIENT
Start: 2019-08-12 | End: 2019-08-12

## 2019-08-12 RX ORDER — PROMETHAZINE HYDROCHLORIDE 25 MG/1
25 TABLET ORAL EVERY 6 HOURS PRN
Qty: 5 TABLET | Refills: 0 | Status: SHIPPED | OUTPATIENT
Start: 2019-08-12 | End: 2019-09-11 | Stop reason: CLARIF

## 2019-08-12 RX ORDER — ONDANSETRON 2 MG/ML
4 INJECTION INTRAMUSCULAR; INTRAVENOUS EVERY 30 MIN PRN
Status: DISCONTINUED | OUTPATIENT
Start: 2019-08-12 | End: 2019-08-12 | Stop reason: HOSPADM

## 2019-08-12 RX ADMIN — Medication 20 MG: at 08:13

## 2019-08-12 RX ADMIN — SODIUM CHLORIDE 1000 ML: 9 INJECTION, SOLUTION INTRAVENOUS at 08:16

## 2019-08-12 RX ADMIN — MORPHINE SULFATE 4 MG: 4 INJECTION, SOLUTION INTRAMUSCULAR; INTRAVENOUS at 09:14

## 2019-08-12 RX ADMIN — ONDANSETRON 4 MG: 2 INJECTION INTRAMUSCULAR; INTRAVENOUS at 08:10

## 2019-08-12 RX ADMIN — DICYCLOMINE HYDROCHLORIDE 20 MG: 20 INJECTION, SOLUTION INTRAMUSCULAR at 09:47

## 2019-08-12 ASSESSMENT — PAIN DESCRIPTION - DESCRIPTORS: DESCRIPTORS: TIGHTNESS

## 2019-08-12 ASSESSMENT — PAIN DESCRIPTION - LOCATION: LOCATION: ABDOMEN

## 2019-08-12 ASSESSMENT — PAIN SCALES - GENERAL
PAINLEVEL_OUTOF10: 10
PAINLEVEL_OUTOF10: 10
PAINLEVEL_OUTOF10: 7

## 2019-08-12 ASSESSMENT — ENCOUNTER SYMPTOMS
EYES NEGATIVE: 1
ABDOMINAL PAIN: 1
VOMITING: 1
SHORTNESS OF BREATH: 0
COUGH: 0
CONSTIPATION: 0
DIARRHEA: 0
NAUSEA: 1

## 2019-08-12 ASSESSMENT — PAIN DESCRIPTION - ORIENTATION: ORIENTATION: UPPER

## 2019-08-12 ASSESSMENT — PAIN DESCRIPTION - PAIN TYPE: TYPE: ACUTE PAIN

## 2019-08-12 NOTE — ED PROVIDER NOTES
Date ofevaluation: 8/12/2019    Chief Complaint     Emesis      History of Present Illness     Marisol Gallegos is a 62 y.o. male who presents for c/o n/v. Patient states that he was at his place of employment when approximately 5 hours ago he had onset of nausea vomiting. He has abdominal cramping associated with this. He vomited and then felt like he wanted to vomit more so he stuck his finger down his throat but did not result in any more emesis. He is vomited approximately 3 times today. He has bowel movements are still normal.  He denies any recent travel or sedentary lifestyle. He does drink alcohol but not lately. His pain is more epigastric in location. Review of Systems     Review of Systems   Constitutional: Positive for activity change, appetite change and chills. Negative for diaphoresis and fever. HENT: Negative. Eyes: Negative. Respiratory: Negative for cough and shortness of breath. Cardiovascular: Negative for chest pain and palpitations. Gastrointestinal: Positive for abdominal pain, nausea and vomiting. Negative for constipation and diarrhea. Onset of nausea vomiting at his place of employment approximately 5 hours ago. He has had approximately 3 bouts of emesis. His bowel movements are normal.  He denies any ill contacts of late. He has some epigastric discomfort associated with this. He does have history of alcohol use but has not had any alcohol recently. Genitourinary: Negative for frequency and urgency. Musculoskeletal: Negative. Skin: Negative. Neurological: Negative for dizziness and headaches. Hematological: Negative for adenopathy. Psychiatric/Behavioral: Negative. All other systems reviewed and are negative.       Past Medical, Surgical, Family, and SocialHistory     He has a past medical history of BPH (benign prostatic hypertrophy), Clostridium difficile infection, Diabetes mellitus ([facility] [address] Elevated LDL cholesterol level, Essential [place], [place]  08/12/19 9574

## 2019-08-12 NOTE — ED NOTES
Patient states he needs his cell phone to make calls for a ride, phone is in his vehicle. Pt instr to wait until d/c'd.       Mo Silverio RN  08/12/19 1011

## 2019-08-12 NOTE — ED NOTES
Pt has emebag in hand. Patient restless and moving all over the bed. Pt throwing himself up against head of bed and lying on stomach. Pt instr to sit down on bed appropriately. Pt groaning loudly, difficult to keep still. Bed in lowest position.        Dasia Bryson RN  08/12/19 1007

## 2019-09-05 ENCOUNTER — OFFICE VISIT (OUTPATIENT)
Dept: FAMILY MEDICINE CLINIC | Age: 59
End: 2019-09-05
Payer: COMMERCIAL

## 2019-09-05 VITALS
SYSTOLIC BLOOD PRESSURE: 114 MMHG | HEIGHT: 72 IN | RESPIRATION RATE: 16 BRPM | TEMPERATURE: 97.6 F | WEIGHT: 184.5 LBS | DIASTOLIC BLOOD PRESSURE: 76 MMHG | BODY MASS INDEX: 24.99 KG/M2 | HEART RATE: 76 BPM | OXYGEN SATURATION: 98 %

## 2019-09-05 DIAGNOSIS — Z28.21 INFLUENZA VACCINATION DECLINED BY PATIENT: ICD-10-CM

## 2019-09-05 DIAGNOSIS — Z12.11 COLON CANCER SCREENING: ICD-10-CM

## 2019-09-05 DIAGNOSIS — Z12.5 SCREENING PSA (PROSTATE SPECIFIC ANTIGEN): ICD-10-CM

## 2019-09-05 DIAGNOSIS — E66.3 OVERWEIGHT (BMI 25.0-29.9): ICD-10-CM

## 2019-09-05 DIAGNOSIS — E78.00 ELEVATED LDL CHOLESTEROL LEVEL: ICD-10-CM

## 2019-09-05 DIAGNOSIS — R74.8 ALKALINE PHOSPHATASE ELEVATION: ICD-10-CM

## 2019-09-05 DIAGNOSIS — Z87.891 FORMER SMOKER: ICD-10-CM

## 2019-09-05 DIAGNOSIS — Z28.21 PNEUMOCOCCAL VACCINATION DECLINED BY PATIENT: ICD-10-CM

## 2019-09-05 DIAGNOSIS — N40.0 BENIGN PROSTATIC HYPERPLASIA WITHOUT LOWER URINARY TRACT SYMPTOMS: ICD-10-CM

## 2019-09-05 DIAGNOSIS — I10 ESSENTIAL HYPERTENSION, BENIGN: ICD-10-CM

## 2019-09-05 LAB
CHOLESTEROL, TOTAL: 130 MG/DL (ref 0–199)
CREATININE URINE: 125.5 MG/DL (ref 39–259)
HDLC SERPL-MCNC: 33 MG/DL (ref 40–60)
LDL CHOLESTEROL CALCULATED: 68 MG/DL
MICROALBUMIN UR-MCNC: 2.9 MG/DL
MICROALBUMIN/CREAT UR-RTO: 23.1 MG/G (ref 0–30)
TRIGL SERPL-MCNC: 147 MG/DL (ref 0–150)
VLDLC SERPL CALC-MCNC: 29 MG/DL

## 2019-09-05 PROCEDURE — 82274 ASSAY TEST FOR BLOOD FECAL: CPT | Performed by: FAMILY MEDICINE

## 2019-09-05 PROCEDURE — 99214 OFFICE O/P EST MOD 30 MIN: CPT | Performed by: FAMILY MEDICINE

## 2019-09-05 PROCEDURE — 36415 COLL VENOUS BLD VENIPUNCTURE: CPT | Performed by: FAMILY MEDICINE

## 2019-09-05 ASSESSMENT — ENCOUNTER SYMPTOMS
STRIDOR: 0
BACK PAIN: 0
SHORTNESS OF BREATH: 0
ABDOMINAL PAIN: 0
CONSTIPATION: 0
CHOKING: 0
ABDOMINAL DISTENTION: 0
ANAL BLEEDING: 0
VOMITING: 0
COUGH: 0
CHEST TIGHTNESS: 0
DIARRHEA: 0
RECTAL PAIN: 0
NAUSEA: 0
APNEA: 0
WHEEZING: 0
COLOR CHANGE: 0
BLOOD IN STOOL: 0

## 2019-09-05 ASSESSMENT — PATIENT HEALTH QUESTIONNAIRE - PHQ9
SUM OF ALL RESPONSES TO PHQ QUESTIONS 1-9: 0
2. FEELING DOWN, DEPRESSED OR HOPELESS: 0
SUM OF ALL RESPONSES TO PHQ9 QUESTIONS 1 & 2: 0
SUM OF ALL RESPONSES TO PHQ QUESTIONS 1-9: 0
1. LITTLE INTEREST OR PLEASURE IN DOING THINGS: 0

## 2019-09-05 NOTE — PROGRESS NOTES
numbness and headaches. Negative for:Visual disturbance/muscular weakness/paralysis/memory problems. Hematological: Negative for adenopathy. Negative for:Lymph node tenderness   Psychiatric/Behavioral: Negative for sleep disturbance. Negative for:Homicidal tendencies(HI)       Objective:   Physical Exam   Constitutional: He is oriented to person, place, and time. Vital signs are normal. He appears well-developed and well-nourished. He is cooperative. He does not have a sickly appearance. No distress. HENT:   Nose: Nose normal.   Mouth/Throat: Uvula is midline, oropharynx is clear and moist and mucous membranes are normal.   Hearing intact to nml conversation   Eyes: Pupils are equal, round, and reactive to light. Conjunctivae, EOM and lids are normal.   Neck: Trachea normal and normal range of motion. Neck supple. No JVD present. Carotid bruit is not present. Cardiovascular: Normal rate, regular rhythm, normal heart sounds, intact distal pulses and normal pulses. Exam reveals no gallop. No murmur heard. No leg-ankle edema. Pulmonary/Chest: Effort normal and breath sounds normal.   CTAB,good AE bilaterally   Abdominal: Soft. Normal appearance and bowel sounds are normal. He exhibits no distension and no mass. There is no hepatomegaly. There is no tenderness. Lymphadenopathy:     He has no cervical adenopathy. Neurological: He is alert and oriented to person, place, and time. Skin: Skin is warm, dry and intact. No rash noted. He is not diaphoretic. No cyanosis. No pallor. Good skin turgor. Capillary refill=2-3 secs. Psychiatric: He has a normal mood and affect. Assessment:        Diagnosis Orders   1. Uncontrolled type 2 diabetes mellitus without complication, without long-term current use of insulin ([address]  VSS/well appearing. Stable. A1c due. Keep home sugar diary. Diabetes:Check feet daily. Yrly eye checks advised.   Education: Reviewed ABCs of diabetes management (respective goals in parentheses):  A1C (<7), blood pressure (<130/80), and cholesterol (LDL <100). Counseled at this visit on the following: diabetes complication prevention, foot care. Comprehensive Metabolic Panel    Microalbumin / Creatinine Urine Ratio   2. Benign prostatic hyperplasia without lower urinary tract symptoms  Stable. 3. Essential hypertension, benign  Stable. 4. Elevated LDL cholesterol level  Labs due. Comprehensive Metabolic Panel    Lipid Panel   5. Screening PSA (prostate specific antigen)  Psa screening   6. Colon cancer screening  Home test given:POCT Fecal Immunochemical Test (FIT). Colonoscopy report:pt' to release record to verify f/u testing. Pt' aware this is for colon cancer testing & importance of following GI re-testing recommendations. 7. Pneumococcal vaccination declined by patient     8. Influenza vaccination declined by patient     9. Alkaline phosphatase elevation  Comprehensive Metabolic Panel  Recheck lab. 10. Overweight (BMI 25.0-29. 9)  Diet & exercise regime reviewed. 6. Former smoker  Congratulated. Plan:         Obtain labs/diagnostic tests as discussed today & call back for results within 2-7days. Advised to go to local ER or call 911 for any worrisome signs/sx. Pt' left office in good condition.

## 2019-09-06 LAB
ESTIMATED AVERAGE GLUCOSE: 182.9 MG/DL
HBA1C MFR BLD: 8 %

## 2019-09-11 ENCOUNTER — OFFICE VISIT (OUTPATIENT)
Dept: FAMILY MEDICINE CLINIC | Age: 59
End: 2019-09-11
Payer: COMMERCIAL

## 2019-09-11 VITALS
HEIGHT: 72 IN | SYSTOLIC BLOOD PRESSURE: 126 MMHG | RESPIRATION RATE: 16 BRPM | OXYGEN SATURATION: 98 % | DIASTOLIC BLOOD PRESSURE: 75 MMHG | BODY MASS INDEX: 24.81 KG/M2 | HEART RATE: 65 BPM | WEIGHT: 183.2 LBS | TEMPERATURE: 97.9 F

## 2019-09-11 DIAGNOSIS — I10 ESSENTIAL HYPERTENSION, BENIGN: ICD-10-CM

## 2019-09-11 DIAGNOSIS — R35.0 URINARY FREQUENCY: ICD-10-CM

## 2019-09-11 DIAGNOSIS — N39.0 ACUTE UTI: Primary | ICD-10-CM

## 2019-09-11 DIAGNOSIS — E78.00 ELEVATED LDL CHOLESTEROL LEVEL: ICD-10-CM

## 2019-09-11 LAB
BACTERIA URINE, POC: ABNORMAL
BILIRUBIN URINE: 0 MG/DL
BLOOD, URINE: POSITIVE
CASTS URINE, POC: ABNORMAL
CLARITY: ABNORMAL
COLOR: YELLOW
CONTROL: NORMAL
CRYSTALS URINE, POC: ABNORMAL
EPI CELLS URINE, POC: ABNORMAL
GLUCOSE URINE: POSITIVE
HEMOCCULT STL QL: NEGATIVE
KETONES, URINE: NEGATIVE
LEUKOCYTE EST, POC: POSITIVE
NITRITE, URINE: NEGATIVE
PH UA: 7 (ref 4.5–8)
PROTEIN UA: POSITIVE
RBC URINE, POC: ABNORMAL
SPECIFIC GRAVITY UA: ABNORMAL (ref 1–1.03)
UROBILINOGEN, URINE: ABNORMAL
WBC URINE, POC: ABNORMAL
YEAST URINE, POC: ABNORMAL

## 2019-09-11 PROCEDURE — 81000 URINALYSIS NONAUTO W/SCOPE: CPT | Performed by: FAMILY MEDICINE

## 2019-09-11 PROCEDURE — 99214 OFFICE O/P EST MOD 30 MIN: CPT | Performed by: FAMILY MEDICINE

## 2019-09-11 PROCEDURE — 81003 URINALYSIS AUTO W/O SCOPE: CPT | Performed by: FAMILY MEDICINE

## 2019-09-11 RX ORDER — CIPROFLOXACIN 250 MG/1
250 TABLET, FILM COATED ORAL 2 TIMES DAILY
Qty: 10 TABLET | Refills: 0 | Status: SHIPPED | OUTPATIENT
Start: 2019-09-11 | End: 2019-09-16

## 2019-09-11 ASSESSMENT — ENCOUNTER SYMPTOMS
RECTAL PAIN: 0
ABDOMINAL PAIN: 0
COLOR CHANGE: 0
ABDOMINAL DISTENTION: 0
BLOOD IN STOOL: 0
APNEA: 0
DIARRHEA: 0
STRIDOR: 0
ANAL BLEEDING: 0
BACK PAIN: 0
CONSTIPATION: 0
NAUSEA: 0
SHORTNESS OF BREATH: 0
VOMITING: 0
COUGH: 0
WHEEZING: 0
CHEST TIGHTNESS: 0
CHOKING: 0

## 2019-09-11 NOTE — PROGRESS NOTES
by nothing reported. Improves w/low salt diet. Adds salt to food at the table:Never does. Denies cp/sob/pnd/ankle edema/dizziness. Hyperlipidemia:diet managed:see labs above. Associated w/nothing else. Improving factors:diet regime. Worsening factors:nothing new. Denies adbo pain/myalgias. Allergies   Allergen Reactions    Ibuprofen Swelling     swelling    Shellfish-Derived Products        Current Outpatient Medications on File Prior to Visit   Medication Sig Dispense Refill    metFORMIN (GLUCOPHAGE) 500 MG tablet TAKE 1 TABLET BY MOUTH TWICE DAILY WITH MEALS FOR DIABETES 60 tablet 1     No current facility-administered medications on file prior to visit. Past Medical History:   Diagnosis Date    BPH (benign prostatic hypertrophy) 9/2/2015    Clostridium difficile infection 10/24/2016    Diabetes mellitus ([facility] [address] 2012    Elevated LDL cholesterol level 1/27/2015    Essential hypertension, benign 1/27/2015    S/P colonoscopy 2011    Nml per pt'. Social History     Tobacco Use    Smoking status: Former Smoker     Packs/day: 0.25     Years: 10.00     Pack years: 2.50     Types: Cigarettes    Smokeless tobacco: Never Used   Substance Use Topics    Alcohol use: Yes     Alcohol/week: 0.0 standard drinks     Comment: occasionally    Drug use: No     Types: Marijuana     Comment: 5-6 years ago      Social History     Substance and Sexual Activity   Drug Use No    Types: Marijuana    Comment: 5-6 years ago              Review of Systems   Constitutional: Negative for activity change, appetite change, chills, diaphoresis, fatigue, fever and unexpected weight change. Negative for:Difficulty sleeping/night sweats/unexplained weight loss or gain/malaise   HENT:        Negative for:Dizziness/vertigo   Eyes: Negative for visual disturbance. Respiratory: Negative for apnea, cough, choking, chest tightness, shortness of breath, wheezing and stridor.          Negative cholesterol level  Stable/at goal except HDL  The patient is asked to make an attempt to improve diet and exercise patterns to aid in medical management of this problem. Labs in 3-4mos. .  Comprehensive Metabolic Panel    Lipid Panel             Plan:         Obtain labs/diagnostic tests as discussed today & call back for results within 2-7days. Advised to go to local ER or call 911 for any worrisome signs/sx. Pt' left office in good condition. Call or return to clinic prn if these symptoms worsen or fail to improve as anticipated.

## 2019-09-12 LAB
BACTERIA: ABNORMAL /HPF
BILIRUBIN URINE: NEGATIVE
BLOOD, URINE: ABNORMAL
CLARITY: ABNORMAL
COLOR: YELLOW
EPITHELIAL CELLS, UA: 0 /HPF (ref 0–5)
GLUCOSE URINE: 500 MG/DL
HYALINE CASTS: 20 /LPF (ref 0–8)
KETONES, URINE: NEGATIVE MG/DL
LEUKOCYTE ESTERASE, URINE: ABNORMAL
MICROSCOPIC EXAMINATION: YES
NITRITE, URINE: NEGATIVE
PH UA: 8 (ref 5–8)
PROTEIN UA: 100 MG/DL
RBC UA: 3 /HPF (ref 0–4)
SPECIFIC GRAVITY UA: 1.02 (ref 1–1.03)
URINE TYPE: ABNORMAL
UROBILINOGEN, URINE: 1 E.U./DL
WBC UA: 115 /HPF (ref 0–5)

## 2019-09-14 LAB
ORGANISM: ABNORMAL
URINE CULTURE, ROUTINE: ABNORMAL

## 2019-10-21 ENCOUNTER — TELEPHONE (OUTPATIENT)
Dept: FAMILY MEDICINE CLINIC | Age: 59
End: 2019-10-21

## 2019-10-21 ENCOUNTER — OFFICE VISIT (OUTPATIENT)
Dept: INTERNAL MEDICINE CLINIC | Age: 59
End: 2019-10-21
Payer: COMMERCIAL

## 2019-10-21 VITALS
HEART RATE: 83 BPM | DIASTOLIC BLOOD PRESSURE: 86 MMHG | OXYGEN SATURATION: 96 % | BODY MASS INDEX: 25.5 KG/M2 | WEIGHT: 188 LBS | SYSTOLIC BLOOD PRESSURE: 124 MMHG

## 2019-10-21 DIAGNOSIS — R31.1 BENIGN ESSENTIAL MICROSCOPIC HEMATURIA: ICD-10-CM

## 2019-10-21 DIAGNOSIS — R31.0 GROSS HEMATURIA: ICD-10-CM

## 2019-10-21 DIAGNOSIS — R31.1 BENIGN ESSENTIAL MICROSCOPIC HEMATURIA: Primary | ICD-10-CM

## 2019-10-21 LAB
A/G RATIO: 1.7 (ref 1.1–2.2)
ALBUMIN SERPL-MCNC: 4.1 G/DL (ref 3.4–5)
ALP BLD-CCNC: 139 U/L (ref 40–129)
ALT SERPL-CCNC: 24 U/L (ref 10–40)
ANION GAP SERPL CALCULATED.3IONS-SCNC: 16 MMOL/L (ref 3–16)
AST SERPL-CCNC: 18 U/L (ref 15–37)
BASOPHILS ABSOLUTE: 0.1 K/UL (ref 0–0.2)
BASOPHILS RELATIVE PERCENT: 1.1 %
BILIRUB SERPL-MCNC: 1.3 MG/DL (ref 0–1)
BILIRUBIN, POC: NORMAL
BLOOD URINE, POC: NORMAL
BUN BLDV-MCNC: 11 MG/DL (ref 7–20)
CALCIUM SERPL-MCNC: 9.4 MG/DL (ref 8.3–10.6)
CHLORIDE BLD-SCNC: 102 MMOL/L (ref 99–110)
CLARITY, POC: NORMAL
CO2: 22 MMOL/L (ref 21–32)
COLOR, POC: NORMAL
CREAT SERPL-MCNC: 1 MG/DL (ref 0.8–1.3)
EOSINOPHILS ABSOLUTE: 0.2 K/UL (ref 0–0.6)
EOSINOPHILS RELATIVE PERCENT: 3 %
GFR AFRICAN AMERICAN: >60
GFR NON-AFRICAN AMERICAN: >60
GLOBULIN: 2.4 G/DL
GLUCOSE BLD-MCNC: 145 MG/DL (ref 70–99)
GLUCOSE URINE, POC: NORMAL
HCT VFR BLD CALC: 38.5 % (ref 40.5–52.5)
HEMOGLOBIN: 12.9 G/DL (ref 13.5–17.5)
KETONES, POC: NORMAL
LEUKOCYTE EST, POC: NORMAL
LYMPHOCYTES ABSOLUTE: 2.8 K/UL (ref 1–5.1)
LYMPHOCYTES RELATIVE PERCENT: 39.4 %
MCH RBC QN AUTO: 29.8 PG (ref 26–34)
MCHC RBC AUTO-ENTMCNC: 33.6 G/DL (ref 31–36)
MCV RBC AUTO: 88.5 FL (ref 80–100)
MONOCYTES ABSOLUTE: 0.6 K/UL (ref 0–1.3)
MONOCYTES RELATIVE PERCENT: 7.7 %
NEUTROPHILS ABSOLUTE: 3.5 K/UL (ref 1.7–7.7)
NEUTROPHILS RELATIVE PERCENT: 48.8 %
NITRITE, POC: NORMAL
PDW BLD-RTO: 13.5 % (ref 12.4–15.4)
PH, POC: 6
PLATELET # BLD: 277 K/UL (ref 135–450)
PMV BLD AUTO: 8.3 FL (ref 5–10.5)
POTASSIUM SERPL-SCNC: 4.1 MMOL/L (ref 3.5–5.1)
PROTEIN, POC: NORMAL
RBC # BLD: 4.35 M/UL (ref 4.2–5.9)
SODIUM BLD-SCNC: 140 MMOL/L (ref 136–145)
SPECIFIC GRAVITY, POC: 1.02
TOTAL PROTEIN: 6.5 G/DL (ref 6.4–8.2)
UROBILINOGEN, POC: NORMAL
WBC # BLD: 7.2 K/UL (ref 4–11)

## 2019-10-21 PROCEDURE — 99214 OFFICE O/P EST MOD 30 MIN: CPT | Performed by: NURSE PRACTITIONER

## 2019-10-21 PROCEDURE — 81002 URINALYSIS NONAUTO W/O SCOPE: CPT | Performed by: NURSE PRACTITIONER

## 2019-10-21 PROCEDURE — 81003 URINALYSIS AUTO W/O SCOPE: CPT | Performed by: NURSE PRACTITIONER

## 2019-10-21 RX ORDER — CIPROFLOXACIN 250 MG/1
250 TABLET, FILM COATED ORAL 2 TIMES DAILY
Qty: 14 TABLET | Refills: 0 | Status: SHIPPED | OUTPATIENT
Start: 2019-10-21 | End: 2019-10-28

## 2019-10-21 ASSESSMENT — ENCOUNTER SYMPTOMS
EYE ITCHING: 0
CHEST TIGHTNESS: 0
COLOR CHANGE: 0
CONSTIPATION: 0
BLOOD IN STOOL: 0
BACK PAIN: 0
EYE REDNESS: 0
RHINORRHEA: 0
SORE THROAT: 0
SINUS PRESSURE: 0
ABDOMINAL PAIN: 0
DIARRHEA: 0
COUGH: 0
VOMITING: 0
NAUSEA: 0
WHEEZING: 0
SHORTNESS OF BREATH: 0

## 2019-10-21 ASSESSMENT — PATIENT HEALTH QUESTIONNAIRE - PHQ9
SUM OF ALL RESPONSES TO PHQ QUESTIONS 1-9: 0
1. LITTLE INTEREST OR PLEASURE IN DOING THINGS: 0
2. FEELING DOWN, DEPRESSED OR HOPELESS: 0
SUM OF ALL RESPONSES TO PHQ QUESTIONS 1-9: 0
SUM OF ALL RESPONSES TO PHQ9 QUESTIONS 1 & 2: 0

## 2019-10-22 LAB
BACTERIA: ABNORMAL /HPF
BILIRUBIN URINE: ABNORMAL
BLOOD, URINE: ABNORMAL
CLARITY: ABNORMAL
COLOR: ABNORMAL
GLUCOSE URINE: 250 MG/DL
KETONES, URINE: ABNORMAL MG/DL
LEUKOCYTE ESTERASE, URINE: ABNORMAL
MICROSCOPIC EXAMINATION: YES
NITRITE, URINE: POSITIVE
PH UA: 6.5 (ref 5–8)
PROTEIN UA: >=300 MG/DL
RBC UA: ABNORMAL /HPF (ref 0–2)
SPECIFIC GRAVITY UA: 1.02 (ref 1–1.03)
URINE TYPE: ABNORMAL
UROBILINOGEN, URINE: 2 E.U./DL
WBC UA: ABNORMAL /HPF (ref 0–5)
YEAST: PRESENT /HPF

## 2019-10-23 ENCOUNTER — TELEPHONE (OUTPATIENT)
Dept: INTERNAL MEDICINE CLINIC | Age: 59
End: 2019-10-23

## 2019-10-23 LAB — URINE CULTURE, ROUTINE: NORMAL

## 2019-10-24 ENCOUNTER — HOSPITAL ENCOUNTER (OUTPATIENT)
Dept: CT IMAGING | Age: 59
Discharge: HOME | End: 2019-10-24
Payer: COMMERCIAL

## 2019-10-24 ENCOUNTER — TELEPHONE (OUTPATIENT)
Dept: INTERNAL MEDICINE CLINIC | Age: 59
End: 2019-10-24

## 2019-10-24 DIAGNOSIS — R31.0 GROSS HEMATURIA: Primary | ICD-10-CM

## 2019-10-24 DIAGNOSIS — R31.0 GROSS HEMATURIA: ICD-10-CM

## 2019-10-24 PROCEDURE — 74178 CT ABD&PLV WO CNTR FLWD CNTR: CPT

## 2019-10-24 PROCEDURE — 6360000004 HC RX CONTRAST MEDICATION: Performed by: NURSE PRACTITIONER

## 2019-10-24 RX ADMIN — IOPAMIDOL 80 ML: 755 INJECTION, SOLUTION INTRAVENOUS at 11:00

## 2019-10-25 ENCOUNTER — TELEPHONE (OUTPATIENT)
Dept: INTERNAL MEDICINE CLINIC | Age: 59
End: 2019-10-25

## 2019-10-25 DIAGNOSIS — R31.0 GROSS HEMATURIA: Primary | ICD-10-CM

## 2019-10-25 DIAGNOSIS — K82.8 THICKENING OF WALL OF GALLBLADDER: ICD-10-CM

## 2019-11-25 ENCOUNTER — HOSPITAL ENCOUNTER (OUTPATIENT)
Dept: PREADMISSION TESTING | Age: 59
Discharge: HOME | End: 2019-11-29
Payer: COMMERCIAL

## 2019-11-25 DIAGNOSIS — Z01.818 ENCOUNTER FOR PREADMISSION TESTING: Primary | ICD-10-CM

## 2019-11-25 LAB
ABO/RH: NORMAL
ANION GAP SERPL CALCULATED.3IONS-SCNC: 12 MMOL/L (ref 3–16)
ANTIBODY SCREEN: NORMAL
APTT: 29.3 SEC (ref 24.2–36.2)
BUN BLDV-MCNC: 15 MG/DL (ref 7–20)
CALCIUM SERPL-MCNC: 9.7 MG/DL (ref 8.3–10.6)
CHLORIDE BLD-SCNC: 105 MMOL/L (ref 99–110)
CO2: 24 MMOL/L (ref 21–32)
CREAT SERPL-MCNC: 1.1 MG/DL (ref 0.8–1.3)
EKG ATRIAL RATE: 64 BPM
EKG DIAGNOSIS: NORMAL
EKG P AXIS: 61 DEGREES
EKG P-R INTERVAL: 148 MS
EKG Q-T INTERVAL: 380 MS
EKG QRS DURATION: 92 MS
EKG QTC CALCULATION (BAZETT): 392 MS
EKG R AXIS: 99 DEGREES
EKG T AXIS: 69 DEGREES
EKG VENTRICULAR RATE: 64 BPM
GFR AFRICAN AMERICAN: >60
GFR NON-AFRICAN AMERICAN: >60
GLUCOSE BLD-MCNC: 190 MG/DL (ref 70–99)
HCT VFR BLD CALC: 43.5 % (ref 40.5–52.5)
HEMOGLOBIN: 14.5 G/DL (ref 13.5–17.5)
INR BLD: 0.81 (ref 0.86–1.14)
MCH RBC QN AUTO: 30 PG (ref 26–34)
MCHC RBC AUTO-ENTMCNC: 33.4 G/DL (ref 31–36)
MCV RBC AUTO: 89.7 FL (ref 80–100)
PDW BLD-RTO: 13 % (ref 12.4–15.4)
PLATELET # BLD: 275 K/UL (ref 135–450)
PMV BLD AUTO: 8.2 FL (ref 5–10.5)
POTASSIUM SERPL-SCNC: 4.1 MMOL/L (ref 3.5–5.1)
PROTHROMBIN TIME: 9.4 SEC (ref 10–13.2)
RBC # BLD: 4.85 M/UL (ref 4.2–5.9)
SODIUM BLD-SCNC: 141 MMOL/L (ref 136–145)
WBC # BLD: 9 K/UL (ref 4–11)

## 2019-11-25 PROCEDURE — 85730 THROMBOPLASTIN TIME PARTIAL: CPT

## 2019-11-25 PROCEDURE — 86900 BLOOD TYPING SEROLOGIC ABO: CPT

## 2019-11-25 PROCEDURE — 80048 BASIC METABOLIC PNL TOTAL CA: CPT

## 2019-11-25 PROCEDURE — 85027 COMPLETE CBC AUTOMATED: CPT

## 2019-11-25 PROCEDURE — 85610 PROTHROMBIN TIME: CPT

## 2019-11-25 PROCEDURE — 93005 ELECTROCARDIOGRAM TRACING: CPT | Performed by: UROLOGY

## 2019-11-25 PROCEDURE — 93010 ELECTROCARDIOGRAM REPORT: CPT | Performed by: INTERNAL MEDICINE

## 2019-11-25 PROCEDURE — 86850 RBC ANTIBODY SCREEN: CPT

## 2019-11-25 PROCEDURE — 86901 BLOOD TYPING SEROLOGIC RH(D): CPT

## 2019-11-26 ENCOUNTER — OFFICE VISIT (OUTPATIENT)
Dept: FAMILY MEDICINE CLINIC | Age: 59
End: 2019-11-26
Payer: COMMERCIAL

## 2019-11-26 VITALS
TEMPERATURE: 98.2 F | BODY MASS INDEX: 26.07 KG/M2 | OXYGEN SATURATION: 97 % | HEIGHT: 72 IN | DIASTOLIC BLOOD PRESSURE: 81 MMHG | SYSTOLIC BLOOD PRESSURE: 122 MMHG | HEART RATE: 70 BPM | RESPIRATION RATE: 16 BRPM | WEIGHT: 192.5 LBS

## 2019-11-26 DIAGNOSIS — R31.0 GROSS HEMATURIA: ICD-10-CM

## 2019-11-26 DIAGNOSIS — N40.1 BENIGN PROSTATIC HYPERPLASIA (BPH) WITH STRAINING ON URINATION: ICD-10-CM

## 2019-11-26 DIAGNOSIS — I10 ESSENTIAL HYPERTENSION, BENIGN: ICD-10-CM

## 2019-11-26 DIAGNOSIS — Z01.818 PREOP EXAMINATION: Primary | ICD-10-CM

## 2019-11-26 DIAGNOSIS — E66.3 OVERWEIGHT (BMI 25.0-29.9): ICD-10-CM

## 2019-11-26 DIAGNOSIS — E78.00 ELEVATED LDL CHOLESTEROL LEVEL: ICD-10-CM

## 2019-11-26 DIAGNOSIS — Z28.21 INFLUENZA VACCINATION DECLINED BY PATIENT: ICD-10-CM

## 2019-11-26 DIAGNOSIS — R39.16 BENIGN PROSTATIC HYPERPLASIA (BPH) WITH STRAINING ON URINATION: ICD-10-CM

## 2019-11-26 PROCEDURE — 99243 OFF/OP CNSLTJ NEW/EST LOW 30: CPT | Performed by: FAMILY MEDICINE

## 2019-11-26 ASSESSMENT — ENCOUNTER SYMPTOMS
COLOR CHANGE: 0
SORE THROAT: 0
NAUSEA: 0
WHEEZING: 0
VOICE CHANGE: 0
RHINORRHEA: 0
VOMITING: 0
BACK PAIN: 0
DIARRHEA: 0
ANAL BLEEDING: 0
PHOTOPHOBIA: 0
ABDOMINAL DISTENTION: 0
CONSTIPATION: 0
BLOOD IN STOOL: 0
EYE REDNESS: 0
TROUBLE SWALLOWING: 0
SINUS PAIN: 0
CHOKING: 0
STRIDOR: 0
ABDOMINAL PAIN: 0
SHORTNESS OF BREATH: 0
EYE ITCHING: 0
EYE DISCHARGE: 0
FACIAL SWELLING: 0
COUGH: 0
EYE PAIN: 0
APNEA: 0
CHEST TIGHTNESS: 0
RECTAL PAIN: 0
SINUS PRESSURE: 0

## 2019-11-27 LAB
ESTIMATED AVERAGE GLUCOSE: 145.6 MG/DL
HBA1C MFR BLD: 6.7 %

## 2019-12-04 RX ORDER — M-VIT,TX,IRON,MINS/CALC/FOLIC 27MG-0.4MG
1 TABLET ORAL DAILY
COMMUNITY

## 2019-12-05 ENCOUNTER — ANESTHESIA EVENT (OUTPATIENT)
Dept: OPERATING ROOM | Age: 59
DRG: 713 | End: 2019-12-05
Payer: COMMERCIAL

## 2019-12-06 ENCOUNTER — ANESTHESIA (OUTPATIENT)
Dept: OPERATING ROOM | Age: 59
DRG: 713 | End: 2019-12-06
Payer: COMMERCIAL

## 2019-12-06 ENCOUNTER — HOSPITAL ENCOUNTER (INPATIENT)
Age: 59
LOS: 2 days | Discharge: HOME HEALTH | DRG: 713 | End: 2019-12-08
Attending: UROLOGY | Admitting: UROLOGY
Payer: COMMERCIAL

## 2019-12-06 VITALS
OXYGEN SATURATION: 100 % | SYSTOLIC BLOOD PRESSURE: 142 MMHG | TEMPERATURE: 95.9 F | DIASTOLIC BLOOD PRESSURE: 86 MMHG | RESPIRATION RATE: 1 BRPM

## 2019-12-06 DIAGNOSIS — N40.1 BENIGN PROSTATIC HYPERPLASIA WITH LOWER URINARY TRACT SYMPTOMS, SYMPTOM DETAILS UNSPECIFIED: ICD-10-CM

## 2019-12-06 PROBLEM — N13.8 BPH WITH URINARY OBSTRUCTION: Status: ACTIVE | Noted: 2019-12-06

## 2019-12-06 LAB
ABO/RH: NORMAL
ANTIBODY SCREEN: NORMAL
GLUCOSE BLD-MCNC: 179 MG/DL (ref 70–99)
GLUCOSE BLD-MCNC: 201 MG/DL (ref 70–99)
GLUCOSE BLD-MCNC: 240 MG/DL (ref 70–99)
GLUCOSE BLD-MCNC: 362 MG/DL (ref 70–99)
PERFORMED ON: ABNORMAL

## 2019-12-06 PROCEDURE — 6360000002 HC RX W HCPCS: Performed by: ANESTHESIOLOGY

## 2019-12-06 PROCEDURE — 2580000003 HC RX 258: Performed by: ANESTHESIOLOGY

## 2019-12-06 PROCEDURE — 7100000001 HC PACU RECOVERY - ADDTL 15 MIN: Performed by: UROLOGY

## 2019-12-06 PROCEDURE — 6360000002 HC RX W HCPCS: Performed by: UROLOGY

## 2019-12-06 PROCEDURE — 0TJB8ZZ INSPECTION OF BLADDER, VIA NATURAL OR ARTIFICIAL OPENING ENDOSCOPIC: ICD-10-PCS | Performed by: UROLOGY

## 2019-12-06 PROCEDURE — 3700000000 HC ANESTHESIA ATTENDED CARE: Performed by: UROLOGY

## 2019-12-06 PROCEDURE — 2500000003 HC RX 250 WO HCPCS: Performed by: NURSE ANESTHETIST, CERTIFIED REGISTERED

## 2019-12-06 PROCEDURE — 2580000003 HC RX 258: Performed by: UROLOGY

## 2019-12-06 PROCEDURE — 0VT08ZZ RESECTION OF PROSTATE, VIA NATURAL OR ARTIFICIAL OPENING ENDOSCOPIC: ICD-10-PCS | Performed by: UROLOGY

## 2019-12-06 PROCEDURE — 7100000000 HC PACU RECOVERY - FIRST 15 MIN: Performed by: UROLOGY

## 2019-12-06 PROCEDURE — 86900 BLOOD TYPING SEROLOGIC ABO: CPT

## 2019-12-06 PROCEDURE — 86850 RBC ANTIBODY SCREEN: CPT

## 2019-12-06 PROCEDURE — 3600000004 HC SURGERY LEVEL 4 BASE: Performed by: UROLOGY

## 2019-12-06 PROCEDURE — 3600000014 HC SURGERY LEVEL 4 ADDTL 15MIN: Performed by: UROLOGY

## 2019-12-06 PROCEDURE — 1200000000 HC SEMI PRIVATE

## 2019-12-06 PROCEDURE — 2500000003 HC RX 250 WO HCPCS

## 2019-12-06 PROCEDURE — 2709999900 HC NON-CHARGEABLE SUPPLY: Performed by: UROLOGY

## 2019-12-06 PROCEDURE — 88305 TISSUE EXAM BY PATHOLOGIST: CPT

## 2019-12-06 PROCEDURE — 86901 BLOOD TYPING SEROLOGIC RH(D): CPT

## 2019-12-06 PROCEDURE — 6370000000 HC RX 637 (ALT 250 FOR IP): Performed by: UROLOGY

## 2019-12-06 PROCEDURE — 3700000001 HC ADD 15 MINUTES (ANESTHESIA): Performed by: UROLOGY

## 2019-12-06 PROCEDURE — 6360000002 HC RX W HCPCS: Performed by: NURSE ANESTHETIST, CERTIFIED REGISTERED

## 2019-12-06 RX ORDER — SODIUM CHLORIDE 0.9 % (FLUSH) 0.9 %
10 SYRINGE (ML) INJECTION EVERY 12 HOURS SCHEDULED
Status: DISCONTINUED | OUTPATIENT
Start: 2019-12-06 | End: 2019-12-06 | Stop reason: HOSPADM

## 2019-12-06 RX ORDER — ONDANSETRON 2 MG/ML
4 INJECTION INTRAMUSCULAR; INTRAVENOUS
Status: DISCONTINUED | OUTPATIENT
Start: 2019-12-06 | End: 2019-12-06 | Stop reason: HOSPADM

## 2019-12-06 RX ORDER — OXYCODONE HYDROCHLORIDE AND ACETAMINOPHEN 5; 325 MG/1; MG/1
2 TABLET ORAL PRN
Status: DISCONTINUED | OUTPATIENT
Start: 2019-12-06 | End: 2019-12-06 | Stop reason: HOSPADM

## 2019-12-06 RX ORDER — FENTANYL CITRATE 50 UG/ML
INJECTION, SOLUTION INTRAMUSCULAR; INTRAVENOUS PRN
Status: DISCONTINUED | OUTPATIENT
Start: 2019-12-06 | End: 2019-12-06 | Stop reason: SDUPTHER

## 2019-12-06 RX ORDER — SODIUM CHLORIDE 9 MG/ML
INJECTION, SOLUTION INTRAVENOUS CONTINUOUS
Status: DISCONTINUED | OUTPATIENT
Start: 2019-12-06 | End: 2019-12-06

## 2019-12-06 RX ORDER — LIDOCAINE HYDROCHLORIDE 20 MG/ML
INJECTION, SOLUTION EPIDURAL; INFILTRATION; INTRACAUDAL; PERINEURAL PRN
Status: DISCONTINUED | OUTPATIENT
Start: 2019-12-06 | End: 2019-12-06 | Stop reason: SDUPTHER

## 2019-12-06 RX ORDER — MORPHINE SULFATE 2 MG/ML
1 INJECTION, SOLUTION INTRAMUSCULAR; INTRAVENOUS EVERY 5 MIN PRN
Status: DISCONTINUED | OUTPATIENT
Start: 2019-12-06 | End: 2019-12-06 | Stop reason: HOSPADM

## 2019-12-06 RX ORDER — SODIUM CHLORIDE 9 MG/ML
INJECTION, SOLUTION INTRAVENOUS CONTINUOUS
Status: DISCONTINUED | OUTPATIENT
Start: 2019-12-06 | End: 2019-12-08

## 2019-12-06 RX ORDER — SODIUM CHLORIDE 0.9 % (FLUSH) 0.9 %
10 SYRINGE (ML) INJECTION EVERY 12 HOURS SCHEDULED
Status: DISCONTINUED | OUTPATIENT
Start: 2019-12-06 | End: 2019-12-08 | Stop reason: HOSPADM

## 2019-12-06 RX ORDER — MEPERIDINE HYDROCHLORIDE 25 MG/ML
12.5 INJECTION INTRAMUSCULAR; INTRAVENOUS; SUBCUTANEOUS EVERY 5 MIN PRN
Status: DISCONTINUED | OUTPATIENT
Start: 2019-12-06 | End: 2019-12-06 | Stop reason: HOSPADM

## 2019-12-06 RX ORDER — SODIUM CHLORIDE 0.9 % (FLUSH) 0.9 %
10 SYRINGE (ML) INJECTION PRN
Status: DISCONTINUED | OUTPATIENT
Start: 2019-12-06 | End: 2019-12-08 | Stop reason: HOSPADM

## 2019-12-06 RX ORDER — MORPHINE SULFATE 2 MG/ML
2 INJECTION, SOLUTION INTRAMUSCULAR; INTRAVENOUS EVERY 5 MIN PRN
Status: DISCONTINUED | OUTPATIENT
Start: 2019-12-06 | End: 2019-12-06 | Stop reason: HOSPADM

## 2019-12-06 RX ORDER — SUCCINYLCHOLINE/SOD CL,ISO/PF 200MG/10ML
SYRINGE (ML) INTRAVENOUS PRN
Status: DISCONTINUED | OUTPATIENT
Start: 2019-12-06 | End: 2019-12-06 | Stop reason: SDUPTHER

## 2019-12-06 RX ORDER — ATROPA BELLADONNA AND OPIUM 16.2; 3 MG/1; MG/1
30 SUPPOSITORY RECTAL EVERY 8 HOURS PRN
Status: DISCONTINUED | OUTPATIENT
Start: 2019-12-06 | End: 2019-12-08 | Stop reason: HOSPADM

## 2019-12-06 RX ORDER — ONDANSETRON 2 MG/ML
INJECTION INTRAMUSCULAR; INTRAVENOUS PRN
Status: DISCONTINUED | OUTPATIENT
Start: 2019-12-06 | End: 2019-12-06 | Stop reason: SDUPTHER

## 2019-12-06 RX ORDER — ATROPA BELLADONNA AND OPIUM 16.2; 6 MG/1; MG/1
SUPPOSITORY RECTAL
Status: COMPLETED | OUTPATIENT
Start: 2019-12-06 | End: 2019-12-06

## 2019-12-06 RX ORDER — ROCURONIUM BROMIDE 10 MG/ML
INJECTION, SOLUTION INTRAVENOUS PRN
Status: DISCONTINUED | OUTPATIENT
Start: 2019-12-06 | End: 2019-12-06 | Stop reason: SDUPTHER

## 2019-12-06 RX ORDER — PHENYLEPHRINE HCL IN 0.9% NACL 1 MG/10 ML
SYRINGE (ML) INTRAVENOUS PRN
Status: DISCONTINUED | OUTPATIENT
Start: 2019-12-06 | End: 2019-12-06 | Stop reason: SDUPTHER

## 2019-12-06 RX ORDER — ONDANSETRON 2 MG/ML
4 INJECTION INTRAMUSCULAR; INTRAVENOUS EVERY 6 HOURS PRN
Status: DISCONTINUED | OUTPATIENT
Start: 2019-12-06 | End: 2019-12-08 | Stop reason: HOSPADM

## 2019-12-06 RX ORDER — GLYCOPYRROLATE 0.2 MG/ML
INJECTION INTRAMUSCULAR; INTRAVENOUS PRN
Status: DISCONTINUED | OUTPATIENT
Start: 2019-12-06 | End: 2019-12-06 | Stop reason: SDUPTHER

## 2019-12-06 RX ORDER — OXYCODONE HYDROCHLORIDE AND ACETAMINOPHEN 5; 325 MG/1; MG/1
1 TABLET ORAL PRN
Status: DISCONTINUED | OUTPATIENT
Start: 2019-12-06 | End: 2019-12-06 | Stop reason: HOSPADM

## 2019-12-06 RX ORDER — SODIUM CHLORIDE 0.9 % (FLUSH) 0.9 %
10 SYRINGE (ML) INJECTION PRN
Status: DISCONTINUED | OUTPATIENT
Start: 2019-12-06 | End: 2019-12-06 | Stop reason: HOSPADM

## 2019-12-06 RX ORDER — OXYCODONE HYDROCHLORIDE 5 MG/1
5 TABLET ORAL EVERY 4 HOURS PRN
Status: DISCONTINUED | OUTPATIENT
Start: 2019-12-06 | End: 2019-12-08 | Stop reason: HOSPADM

## 2019-12-06 RX ORDER — DEXAMETHASONE SODIUM PHOSPHATE 4 MG/ML
INJECTION, SOLUTION INTRA-ARTICULAR; INTRALESIONAL; INTRAMUSCULAR; INTRAVENOUS; SOFT TISSUE PRN
Status: DISCONTINUED | OUTPATIENT
Start: 2019-12-06 | End: 2019-12-06 | Stop reason: SDUPTHER

## 2019-12-06 RX ORDER — LABETALOL HYDROCHLORIDE 5 MG/ML
5 INJECTION, SOLUTION INTRAVENOUS EVERY 10 MIN PRN
Status: DISCONTINUED | OUTPATIENT
Start: 2019-12-06 | End: 2019-12-06 | Stop reason: HOSPADM

## 2019-12-06 RX ORDER — PROPOFOL 10 MG/ML
INJECTION, EMULSION INTRAVENOUS PRN
Status: DISCONTINUED | OUTPATIENT
Start: 2019-12-06 | End: 2019-12-06 | Stop reason: SDUPTHER

## 2019-12-06 RX ORDER — HYDRALAZINE HYDROCHLORIDE 20 MG/ML
5 INJECTION INTRAMUSCULAR; INTRAVENOUS
Status: DISCONTINUED | OUTPATIENT
Start: 2019-12-06 | End: 2019-12-06 | Stop reason: HOSPADM

## 2019-12-06 RX ADMIN — FENTANYL CITRATE 50 MCG: 50 INJECTION INTRAMUSCULAR; INTRAVENOUS at 12:45

## 2019-12-06 RX ADMIN — SODIUM CHLORIDE: 9 INJECTION, SOLUTION INTRAVENOUS at 10:27

## 2019-12-06 RX ADMIN — HYDROMORPHONE HYDROCHLORIDE 0.5 MG: 1 INJECTION, SOLUTION INTRAMUSCULAR; INTRAVENOUS; SUBCUTANEOUS at 15:02

## 2019-12-06 RX ADMIN — Medication 200 MCG: at 13:34

## 2019-12-06 RX ADMIN — ROCURONIUM BROMIDE 5 MG: 10 INJECTION INTRAVENOUS at 12:31

## 2019-12-06 RX ADMIN — Medication 200 MCG: at 13:11

## 2019-12-06 RX ADMIN — SODIUM CHLORIDE: 9 INJECTION, SOLUTION INTRAVENOUS at 13:16

## 2019-12-06 RX ADMIN — GLYCOPYRROLATE 0.2 MG: 0.2 INJECTION, SOLUTION INTRAMUSCULAR; INTRAVENOUS at 12:20

## 2019-12-06 RX ADMIN — ROCURONIUM BROMIDE 35 MG: 10 INJECTION INTRAVENOUS at 12:37

## 2019-12-06 RX ADMIN — LIDOCAINE HYDROCHLORIDE 100 MG: 20 INJECTION, SOLUTION EPIDURAL; INFILTRATION; INTRACAUDAL; PERINEURAL at 12:29

## 2019-12-06 RX ADMIN — FENTANYL CITRATE 50 MCG: 50 INJECTION INTRAMUSCULAR; INTRAVENOUS at 12:31

## 2019-12-06 RX ADMIN — SODIUM CHLORIDE: 9 INJECTION, SOLUTION INTRAVENOUS at 12:15

## 2019-12-06 RX ADMIN — ONDANSETRON 4 MG: 2 INJECTION INTRAMUSCULAR; INTRAVENOUS at 13:10

## 2019-12-06 RX ADMIN — CEFTRIAXONE 2 G: 2 INJECTION, POWDER, FOR SOLUTION INTRAMUSCULAR; INTRAVENOUS at 12:20

## 2019-12-06 RX ADMIN — PROPOFOL 180 MG: 10 INJECTION, EMULSION INTRAVENOUS at 12:32

## 2019-12-06 RX ADMIN — DEXAMETHASONE SODIUM PHOSPHATE 8 MG: 4 INJECTION, SOLUTION INTRAMUSCULAR; INTRAVENOUS at 13:10

## 2019-12-06 RX ADMIN — Medication 120 MG: at 12:31

## 2019-12-06 RX ADMIN — Medication 2 G: at 18:15

## 2019-12-06 ASSESSMENT — PULMONARY FUNCTION TESTS
PIF_VALUE: 15
PIF_VALUE: 12
PIF_VALUE: 15
PIF_VALUE: 15
PIF_VALUE: 4
PIF_VALUE: 15
PIF_VALUE: 12
PIF_VALUE: 1
PIF_VALUE: 23
PIF_VALUE: 15
PIF_VALUE: 15
PIF_VALUE: 0
PIF_VALUE: 20
PIF_VALUE: 13
PIF_VALUE: 15
PIF_VALUE: 0
PIF_VALUE: 15
PIF_VALUE: 3
PIF_VALUE: 14
PIF_VALUE: 15
PIF_VALUE: 14
PIF_VALUE: 12
PIF_VALUE: 17
PIF_VALUE: 15
PIF_VALUE: 14
PIF_VALUE: 15
PIF_VALUE: 12
PIF_VALUE: 15
PIF_VALUE: 15
PIF_VALUE: 12
PIF_VALUE: 0
PIF_VALUE: 15
PIF_VALUE: 12
PIF_VALUE: 20
PIF_VALUE: 1
PIF_VALUE: 15
PIF_VALUE: 16
PIF_VALUE: 12
PIF_VALUE: 0
PIF_VALUE: 15
PIF_VALUE: 15
PIF_VALUE: 1
PIF_VALUE: 1
PIF_VALUE: 13
PIF_VALUE: 15
PIF_VALUE: 12
PIF_VALUE: 15
PIF_VALUE: 12
PIF_VALUE: 0
PIF_VALUE: 1
PIF_VALUE: 15
PIF_VALUE: 11
PIF_VALUE: 12
PIF_VALUE: 15
PIF_VALUE: 4
PIF_VALUE: 9
PIF_VALUE: 16
PIF_VALUE: 10
PIF_VALUE: 8
PIF_VALUE: 0
PIF_VALUE: 14
PIF_VALUE: 15
PIF_VALUE: 15
PIF_VALUE: 2
PIF_VALUE: 0
PIF_VALUE: 15
PIF_VALUE: 15
PIF_VALUE: 26
PIF_VALUE: 15
PIF_VALUE: 15
PIF_VALUE: 12
PIF_VALUE: 15
PIF_VALUE: 12
PIF_VALUE: 1
PIF_VALUE: 15
PIF_VALUE: 11
PIF_VALUE: 15
PIF_VALUE: 18
PIF_VALUE: 15
PIF_VALUE: 12
PIF_VALUE: 0
PIF_VALUE: 7
PIF_VALUE: 24
PIF_VALUE: 15
PIF_VALUE: 16
PIF_VALUE: 15
PIF_VALUE: 14
PIF_VALUE: 1
PIF_VALUE: 8
PIF_VALUE: 15
PIF_VALUE: 12
PIF_VALUE: 15
PIF_VALUE: 15
PIF_VALUE: 12
PIF_VALUE: 15
PIF_VALUE: 14
PIF_VALUE: 0
PIF_VALUE: 15
PIF_VALUE: 1
PIF_VALUE: 10
PIF_VALUE: 15
PIF_VALUE: 16
PIF_VALUE: 15
PIF_VALUE: 15

## 2019-12-06 ASSESSMENT — PAIN DESCRIPTION - ORIENTATION
ORIENTATION: MID

## 2019-12-06 ASSESSMENT — PAIN SCALES - GENERAL
PAINLEVEL_OUTOF10: 8
PAINLEVEL_OUTOF10: 4
PAINLEVEL_OUTOF10: 0
PAINLEVEL_OUTOF10: 0
PAINLEVEL_OUTOF10: 7
PAINLEVEL_OUTOF10: 0

## 2019-12-06 ASSESSMENT — PAIN - FUNCTIONAL ASSESSMENT
PAIN_FUNCTIONAL_ASSESSMENT: 0-10
PAIN_FUNCTIONAL_ASSESSMENT: PREVENTS OR INTERFERES SOME ACTIVE ACTIVITIES AND ADLS
PAIN_FUNCTIONAL_ASSESSMENT: ACTIVITIES ARE NOT PREVENTED

## 2019-12-06 ASSESSMENT — PAIN DESCRIPTION - DESCRIPTORS
DESCRIPTORS: DISCOMFORT;PRESSURE
DESCRIPTORS: DISCOMFORT
DESCRIPTORS: BURNING

## 2019-12-06 ASSESSMENT — PAIN DESCRIPTION - PROGRESSION
CLINICAL_PROGRESSION: GRADUALLY IMPROVING
CLINICAL_PROGRESSION: NOT CHANGED
CLINICAL_PROGRESSION: NOT CHANGED
CLINICAL_PROGRESSION: GRADUALLY IMPROVING

## 2019-12-06 ASSESSMENT — PAIN DESCRIPTION - FREQUENCY
FREQUENCY: CONTINUOUS

## 2019-12-06 ASSESSMENT — PAIN DESCRIPTION - PAIN TYPE
TYPE: ACUTE PAIN;SURGICAL PAIN
TYPE: SURGICAL PAIN
TYPE: ACUTE PAIN;SURGICAL PAIN

## 2019-12-06 ASSESSMENT — PAIN DESCRIPTION - ONSET
ONSET: ON-GOING
ONSET: ON-GOING
ONSET: PROGRESSIVE

## 2019-12-06 ASSESSMENT — ENCOUNTER SYMPTOMS: SHORTNESS OF BREATH: 0

## 2019-12-06 ASSESSMENT — PAIN DESCRIPTION - LOCATION
LOCATION: PENIS

## 2019-12-07 LAB
GLUCOSE BLD-MCNC: 187 MG/DL (ref 70–99)
GLUCOSE BLD-MCNC: 224 MG/DL (ref 70–99)
GLUCOSE BLD-MCNC: 256 MG/DL (ref 70–99)
PERFORMED ON: ABNORMAL

## 2019-12-07 PROCEDURE — 2580000003 HC RX 258: Performed by: UROLOGY

## 2019-12-07 PROCEDURE — 6370000000 HC RX 637 (ALT 250 FOR IP): Performed by: UROLOGY

## 2019-12-07 PROCEDURE — 51798 US URINE CAPACITY MEASURE: CPT

## 2019-12-07 PROCEDURE — 1200000000 HC SEMI PRIVATE

## 2019-12-07 PROCEDURE — 6360000002 HC RX W HCPCS: Performed by: UROLOGY

## 2019-12-07 PROCEDURE — 94760 N-INVAS EAR/PLS OXIMETRY 1: CPT

## 2019-12-07 RX ADMIN — Medication 2 G: at 02:45

## 2019-12-07 RX ADMIN — METFORMIN HYDROCHLORIDE 500 MG: 500 TABLET ORAL at 07:59

## 2019-12-07 RX ADMIN — ATROPA BELLADONNA AND OPIUM 30 MG: 16.2; 3 SUPPOSITORY RECTAL at 10:07

## 2019-12-07 RX ADMIN — SODIUM CHLORIDE: 9 INJECTION, SOLUTION INTRAVENOUS at 10:07

## 2019-12-07 RX ADMIN — OXYCODONE HYDROCHLORIDE 5 MG: 5 TABLET ORAL at 12:03

## 2019-12-07 ASSESSMENT — PAIN DESCRIPTION - PAIN TYPE
TYPE: ACUTE PAIN;SURGICAL PAIN

## 2019-12-07 ASSESSMENT — PAIN DESCRIPTION - PROGRESSION
CLINICAL_PROGRESSION: GRADUALLY WORSENING
CLINICAL_PROGRESSION: GRADUALLY IMPROVING
CLINICAL_PROGRESSION: GRADUALLY IMPROVING

## 2019-12-07 ASSESSMENT — PAIN DESCRIPTION - ORIENTATION
ORIENTATION: MID

## 2019-12-07 ASSESSMENT — PAIN DESCRIPTION - FREQUENCY
FREQUENCY: CONTINUOUS

## 2019-12-07 ASSESSMENT — PAIN DESCRIPTION - ONSET
ONSET: ON-GOING

## 2019-12-07 ASSESSMENT — PAIN DESCRIPTION - DESCRIPTORS
DESCRIPTORS: DISCOMFORT
DESCRIPTORS: SPASM;PRESSURE
DESCRIPTORS: ACHING;DISCOMFORT
DESCRIPTORS: PRESSURE;ACHING

## 2019-12-07 ASSESSMENT — PAIN - FUNCTIONAL ASSESSMENT
PAIN_FUNCTIONAL_ASSESSMENT: PREVENTS OR INTERFERES SOME ACTIVE ACTIVITIES AND ADLS
PAIN_FUNCTIONAL_ASSESSMENT: PREVENTS OR INTERFERES SOME ACTIVE ACTIVITIES AND ADLS
PAIN_FUNCTIONAL_ASSESSMENT: ACTIVITIES ARE NOT PREVENTED
PAIN_FUNCTIONAL_ASSESSMENT: PREVENTS OR INTERFERES SOME ACTIVE ACTIVITIES AND ADLS

## 2019-12-07 ASSESSMENT — PAIN DESCRIPTION - LOCATION
LOCATION: PENIS

## 2019-12-07 ASSESSMENT — PAIN SCALES - GENERAL
PAINLEVEL_OUTOF10: 10
PAINLEVEL_OUTOF10: 4
PAINLEVEL_OUTOF10: 2
PAINLEVEL_OUTOF10: 0
PAINLEVEL_OUTOF10: 5
PAINLEVEL_OUTOF10: 4

## 2019-12-08 VITALS
SYSTOLIC BLOOD PRESSURE: 120 MMHG | RESPIRATION RATE: 18 BRPM | HEART RATE: 94 BPM | OXYGEN SATURATION: 98 % | TEMPERATURE: 97.9 F | BODY MASS INDEX: 26.07 KG/M2 | DIASTOLIC BLOOD PRESSURE: 72 MMHG | WEIGHT: 192.46 LBS | HEIGHT: 72 IN

## 2019-12-08 LAB
GLUCOSE BLD-MCNC: 227 MG/DL (ref 70–99)
GLUCOSE BLD-MCNC: 287 MG/DL (ref 70–99)
GLUCOSE BLD-MCNC: 301 MG/DL (ref 70–99)
PERFORMED ON: ABNORMAL

## 2019-12-08 PROCEDURE — 6370000000 HC RX 637 (ALT 250 FOR IP): Performed by: SURGERY

## 2019-12-08 PROCEDURE — 94760 N-INVAS EAR/PLS OXIMETRY 1: CPT

## 2019-12-08 PROCEDURE — 2580000003 HC RX 258: Performed by: UROLOGY

## 2019-12-08 PROCEDURE — 6370000000 HC RX 637 (ALT 250 FOR IP): Performed by: UROLOGY

## 2019-12-08 PROCEDURE — 51798 US URINE CAPACITY MEASURE: CPT

## 2019-12-08 RX ORDER — SILICONE ADHESIVE 1.5" X 3"
1 SHEET (EA) TOPICAL 2 TIMES DAILY PRN
Status: DISCONTINUED | OUTPATIENT
Start: 2019-12-08 | End: 2019-12-08 | Stop reason: HOSPADM

## 2019-12-08 RX ORDER — DOCUSATE SODIUM 100 MG/1
100 CAPSULE, LIQUID FILLED ORAL 2 TIMES DAILY
Status: DISCONTINUED | OUTPATIENT
Start: 2019-12-08 | End: 2019-12-08 | Stop reason: HOSPADM

## 2019-12-08 RX ADMIN — OXYCODONE HYDROCHLORIDE 5 MG: 5 TABLET ORAL at 08:27

## 2019-12-08 RX ADMIN — SODIUM CHLORIDE 1 DROP: 50 SOLUTION/ DROPS OPHTHALMIC at 11:35

## 2019-12-08 RX ADMIN — METFORMIN HYDROCHLORIDE 500 MG: 500 TABLET ORAL at 08:27

## 2019-12-08 RX ADMIN — DOCUSATE SODIUM 100 MG: 100 CAPSULE, LIQUID FILLED ORAL at 10:52

## 2019-12-08 ASSESSMENT — PAIN DESCRIPTION - DESCRIPTORS: DESCRIPTORS: DISCOMFORT;BURNING

## 2019-12-08 ASSESSMENT — PAIN - FUNCTIONAL ASSESSMENT: PAIN_FUNCTIONAL_ASSESSMENT: PREVENTS OR INTERFERES SOME ACTIVE ACTIVITIES AND ADLS

## 2019-12-08 ASSESSMENT — PAIN SCALES - GENERAL
PAINLEVEL_OUTOF10: 0
PAINLEVEL_OUTOF10: 4
PAINLEVEL_OUTOF10: 10

## 2019-12-08 ASSESSMENT — PAIN DESCRIPTION - PAIN TYPE: TYPE: ACUTE PAIN;SURGICAL PAIN

## 2019-12-08 ASSESSMENT — PAIN DESCRIPTION - FREQUENCY: FREQUENCY: INTERMITTENT

## 2019-12-08 ASSESSMENT — PAIN DESCRIPTION - PROGRESSION: CLINICAL_PROGRESSION: NOT CHANGED

## 2019-12-08 ASSESSMENT — PAIN DESCRIPTION - LOCATION: LOCATION: PENIS

## 2019-12-08 ASSESSMENT — PAIN DESCRIPTION - ONSET: ONSET: GRADUAL

## 2020-02-26 ENCOUNTER — OFFICE VISIT (OUTPATIENT)
Dept: FAMILY MEDICINE CLINIC | Age: 60
End: 2020-02-26
Payer: COMMERCIAL

## 2020-02-26 VITALS
SYSTOLIC BLOOD PRESSURE: 128 MMHG | DIASTOLIC BLOOD PRESSURE: 75 MMHG | BODY MASS INDEX: 26.84 KG/M2 | RESPIRATION RATE: 16 BRPM | WEIGHT: 198.2 LBS | HEIGHT: 72 IN | OXYGEN SATURATION: 99 % | TEMPERATURE: 97.8 F | HEART RATE: 79 BPM

## 2020-02-26 PROCEDURE — 99214 OFFICE O/P EST MOD 30 MIN: CPT | Performed by: FAMILY MEDICINE

## 2020-02-26 ASSESSMENT — ENCOUNTER SYMPTOMS
COLOR CHANGE: 0
ANAL BLEEDING: 0
ABDOMINAL PAIN: 0
COUGH: 0
BACK PAIN: 0
DIARRHEA: 0
RECTAL PAIN: 0
CHOKING: 0
APNEA: 0
VOMITING: 0
ABDOMINAL DISTENTION: 0
CONSTIPATION: 0
BLOOD IN STOOL: 0
CHEST TIGHTNESS: 0
SHORTNESS OF BREATH: 0
NAUSEA: 0
WHEEZING: 0
STRIDOR: 0

## 2020-02-26 ASSESSMENT — PATIENT HEALTH QUESTIONNAIRE - PHQ9
2. FEELING DOWN, DEPRESSED OR HOPELESS: 0
SUM OF ALL RESPONSES TO PHQ9 QUESTIONS 1 & 2: 0
1. LITTLE INTEREST OR PLEASURE IN DOING THINGS: 0
SUM OF ALL RESPONSES TO PHQ QUESTIONS 1-9: 0
SUM OF ALL RESPONSES TO PHQ QUESTIONS 1-9: 0

## 2020-02-26 NOTE — PATIENT INSTRUCTIONS
the plate format. Talk to your doctor, a dietitian, or a diabetes educator about your concerns. Carbohydrate counting  With carbohydrate counting, you plan meals based on the amount of carbohydrate in each food. Carbohydrate raises blood sugar higher and more quickly than any other nutrient. It is found in desserts, breads and cereals, and fruit. It's also found in starchy vegetables such as potatoes and corn, grains such as rice and pasta, and milk and yogurt. Spreading carbohydrate throughout the day helps keep your blood sugar levels within your target range. Your daily amount depends on several things, including your weight, how active you are, which diabetes medicines you take, and what your goals are for your blood sugar levels. A registered dietitian or diabetes educator can help you plan how much carbohydrate to include in each meal and snack. A guideline for your daily amount of carbohydrate is:  · 45 to 60 grams at each meal. That's about the same as 3 to 4 carbohydrate servings. · 15 to 20 grams at each snack. That's about the same as 1 carbohydrate serving. The Nutrition Facts label on packaged foods tells you how much carbohydrate is in a serving of the food. First, look at the serving size on the food label. Is that the amount you eat in a serving? All of the nutrition information on a food label is based on that serving size. So if you eat more or less than that, you'll need to adjust the other numbers. Total carbohydrate is the next thing you need to look for on the label. If you count carbohydrate servings, one serving of carbohydrate is 15 grams. For foods that don't come with labels, such as fresh fruits and vegetables, you'll need a guide that lists carbohydrate in these foods. Ask your doctor, dietitian, or diabetes educator about books or other nutrition guides you can use.   If you take insulin, you need to know how many grams of carbohydrate are in a meal. This lets you know how much Version: 12.3  © 0064-4401 [employer]wise, Incorporated. Care instructions adapted under license by [facility] ([facility]). If you have questions about a medical condition or this instruction, always ask your healthcare professional. [address] any warranty or liability for your use of this information.

## 2020-02-26 NOTE — PROGRESS NOTES
Diabetes mellitus ([facility] [address] 2012    Elevated LDL cholesterol level 1/27/2015    Essential hypertension, benign 1/27/2015    S/P colonoscopy 2011    Nml per pt'.  Wears glasses            Social History     Tobacco Use    Smoking status: Former Smoker     Packs/day: 0.25     Years: 10.00     Pack years: 2.50     Types: Cigarettes    Smokeless tobacco: Never Used   Substance Use Topics    Alcohol use: Yes     Alcohol/week: 0.0 standard drinks     Comment: occasionally    Drug use: No     Types: Marijuana     Comment: 5-6 years ago      Social History     Substance and Sexual Activity   Drug Use No    Types: Marijuana    Comment: 5-6 years ago              Review of Systems   Constitutional: Negative for activity change, appetite change, chills, diaphoresis, fatigue, fever and unexpected weight change. Negative for:Difficulty sleeping/night sweats/unexplained weight loss or gain/malaise   HENT:        Negative for:Dizziness/vertigo   Eyes: Negative for visual disturbance. Respiratory: Negative for apnea, cough, choking, chest tightness, shortness of breath, wheezing and stridor. Negative for:Hemoptysis/hoarseness/pain on inspiration. Breasts:tenderness/masses/nipple discharge/skin changes. Cardiovascular: Negative for chest pain, palpitations and leg swelling. Negative for:Syncope/presyncope/lower extremity edema/claudication/PND/irregular heart beats   Gastrointestinal: Negative for abdominal distention, abdominal pain, anal bleeding, blood in stool, constipation, diarrhea, nausea, rectal pain and vomiting. Negative for:Melena/bloating/dysphagia/reflux/loss of appetite   Endocrine: Negative for cold intolerance, heat intolerance, polydipsia, polyphagia and polyuria.    Genitourinary: Negative for decreased urine volume, difficulty urinating, discharge, dysuria, enuresis, flank pain, frequency, genital sores, hematuria, penile pain, penile swelling, scrotal swelling, testicular

## 2020-04-08 ENCOUNTER — VIRTUAL VISIT (OUTPATIENT)
Dept: FAMILY MEDICINE CLINIC | Age: 60
End: 2020-04-08
Payer: COMMERCIAL

## 2020-04-08 ENCOUNTER — TELEPHONE (OUTPATIENT)
Dept: FAMILY MEDICINE CLINIC | Age: 60
End: 2020-04-08

## 2020-04-08 VITALS — HEIGHT: 72 IN | BODY MASS INDEX: 26.82 KG/M2 | WEIGHT: 198 LBS | HEART RATE: 60 BPM

## 2020-04-08 PROCEDURE — 99442 PR PHYS/QHP TELEPHONE EVALUATION 11-20 MIN: CPT | Performed by: FAMILY MEDICINE

## 2020-04-08 RX ORDER — AMOXICILLIN AND CLAVULANATE POTASSIUM 875; 125 MG/1; MG/1
1 TABLET, FILM COATED ORAL 2 TIMES DAILY
Qty: 14 TABLET | Refills: 0 | Status: SHIPPED | OUTPATIENT
Start: 2020-04-08 | End: 2020-04-15

## 2020-04-08 ASSESSMENT — ENCOUNTER SYMPTOMS
RECTAL PAIN: 0
CHOKING: 0
SORE THROAT: 0
EYE PAIN: 0
BLOOD IN STOOL: 0
APNEA: 0
COUGH: 0
WHEEZING: 0
TROUBLE SWALLOWING: 0
CHEST TIGHTNESS: 0
NAUSEA: 0
STRIDOR: 0
FACIAL SWELLING: 0
ABDOMINAL PAIN: 0
PHOTOPHOBIA: 0
EYE ITCHING: 0
EYE REDNESS: 0
RHINORRHEA: 0
ABDOMINAL DISTENTION: 0
VOICE CHANGE: 0
SHORTNESS OF BREATH: 0
VOMITING: 0
SINUS PAIN: 0
ANAL BLEEDING: 0
RESPIRATORY NEGATIVE: 1
DIARRHEA: 0
EYE DISCHARGE: 0
CONSTIPATION: 0
SINUS PRESSURE: 0

## 2020-04-08 NOTE — PROGRESS NOTES
and ear pain. Negative for congestion, dental problem, drooling, facial swelling, hearing loss, mouth sores, nosebleeds, postnasal drip, rhinorrhea, sinus pressure, sinus pain, sneezing, sore throat, tinnitus, trouble swallowing and voice change. Eyes: Negative for photophobia, pain, discharge, redness, itching and visual disturbance. Respiratory: Negative. Negative for apnea, cough, choking, chest tightness, shortness of breath, wheezing and stridor. Cardiovascular: Negative for chest pain, palpitations and leg swelling. Gastrointestinal: Negative for abdominal distention, abdominal pain, anal bleeding, blood in stool, constipation, diarrhea, nausea, rectal pain and vomiting. Neurological: Negative for dizziness. Hematological: Negative for adenopathy. Objective:   Physical Exam  Constitutional:       Comments: Note:exam was conducted with pt' self-palpating. HENT:      Head:      Comments: Per pt' no pain with tragal pressure bilaterally. No external ear lesions. Pulmonary:      Comments: No audible wheezing/cough/sob. Lymphadenopathy:      Comments: No neck LAD per pt' self-palpation. Psychiatric:         Speech: Speech normal.      Comments: Polite. Assessment:       Diagnosis Orders   1. Right ear pain  VSS per limited vitals obtainable via virtual visit(VV)/well appearing. Tx emperically per otitis externa & otitis media since not able to complete visual ear exam.  Possible med side effects reviewed. Pt' wishes to proceed w/med. Do not use cue tips. ciprofloxacin-dexamethasone (CIPRODEX) 0.3-0.1 % otic suspension    amoxicillin-clavulanate (AUGMENTIN) 875-125 MG per tablet     2. Controlled type 2 diabetes mellitus with microalbuminuria, without long-term current use of insulin (HCC)  Stable. Plan:      Call or return to clinic prn if these symptoms worsen or fail to improve as anticipated. Pt' ended call in good condition.   Advised to go to local ER or call 911 for any worrisome signs/sx including but not limited to worsening of current complaint or development of hearing changes-loss.         Constanza Mccrod MD

## 2020-04-08 NOTE — TELEPHONE ENCOUNTER
Med changed to alternative ear drops:if still too expensive or not available then he should check with pharmacy for cheaper alternatives.

## 2020-04-08 NOTE — TELEPHONE ENCOUNTER
[phone] (M)  OV: 2/26/2020    Patient has had an ear infection in R ear for about week and half and is requesting medication. There is drainage in the ear also. Please advise.

## 2020-04-08 NOTE — TELEPHONE ENCOUNTER
Patient calling stating that the medication that was called in for his ear is way to expensive and he needs something else called in     Please advise  Farhan Cardona [phone]

## 2020-04-08 NOTE — TELEPHONE ENCOUNTER
Pharmacy called stating that the new alternative med was also too expensive. Ok per Dr. Crystal Vogel to use the ciprofloxacin solution BID x 7 days. Pharmacy informed.   Close Encounter

## 2020-10-08 ENCOUNTER — HOSPITAL ENCOUNTER (INPATIENT)
Age: 60
LOS: 12 days | Discharge: HOME | DRG: 417 | End: 2020-10-20
Attending: EMERGENCY MEDICINE | Admitting: INTERNAL MEDICINE
Payer: COMMERCIAL

## 2020-10-08 ENCOUNTER — APPOINTMENT (OUTPATIENT)
Dept: CT IMAGING | Age: 60
DRG: 417 | End: 2020-10-08
Payer: COMMERCIAL

## 2020-10-08 PROBLEM — K80.50 CHOLEDOCHOLITHIASIS: Status: ACTIVE | Noted: 2020-10-08

## 2020-10-08 LAB
A/G RATIO: 1.5 (ref 1.1–2.2)
ALBUMIN SERPL-MCNC: 4.4 G/DL (ref 3.4–5)
ALP BLD-CCNC: 604 U/L (ref 40–129)
ALT SERPL-CCNC: 345 U/L (ref 10–40)
ANION GAP SERPL CALCULATED.3IONS-SCNC: 11 MMOL/L (ref 3–16)
AST SERPL-CCNC: 237 U/L (ref 15–37)
BACTERIA: ABNORMAL /HPF
BASOPHILS ABSOLUTE: 0.1 K/UL (ref 0–0.2)
BASOPHILS RELATIVE PERCENT: 0.7 %
BILIRUB SERPL-MCNC: 3.2 MG/DL (ref 0–1)
BILIRUBIN URINE: ABNORMAL
BLOOD, URINE: ABNORMAL
BUN BLDV-MCNC: 22 MG/DL (ref 7–20)
CALCIUM SERPL-MCNC: 10.2 MG/DL (ref 8.3–10.6)
CHLORIDE BLD-SCNC: 99 MMOL/L (ref 99–110)
CLARITY: CLEAR
CO2: 28 MMOL/L (ref 21–32)
COLOR: ABNORMAL
CREAT SERPL-MCNC: 1.2 MG/DL (ref 0.8–1.3)
EOSINOPHILS ABSOLUTE: 0.3 K/UL (ref 0–0.6)
EOSINOPHILS RELATIVE PERCENT: 3.4 %
EPITHELIAL CELLS, UA: ABNORMAL /HPF (ref 0–5)
GFR AFRICAN AMERICAN: >60
GFR NON-AFRICAN AMERICAN: >60
GLOBULIN: 3 G/DL
GLUCOSE BLD-MCNC: 314 MG/DL (ref 70–99)
GLUCOSE URINE: >=1000 MG/DL
HCT VFR BLD CALC: 42.7 % (ref 40.5–52.5)
HEMOGLOBIN: 14.2 G/DL (ref 13.5–17.5)
HYALINE CASTS: ABNORMAL /LPF (ref 0–2)
KETONES, URINE: NEGATIVE MG/DL
LEUKOCYTE ESTERASE, URINE: NEGATIVE
LIPASE: 55 U/L (ref 13–60)
LYMPHOCYTES ABSOLUTE: 1.4 K/UL (ref 1–5.1)
LYMPHOCYTES RELATIVE PERCENT: 13.6 %
MCH RBC QN AUTO: 30.2 PG (ref 26–34)
MCHC RBC AUTO-ENTMCNC: 33.3 G/DL (ref 31–36)
MCV RBC AUTO: 90.6 FL (ref 80–100)
MICROSCOPIC EXAMINATION: YES
MONOCYTES ABSOLUTE: 0.7 K/UL (ref 0–1.3)
MONOCYTES RELATIVE PERCENT: 6.7 %
NEUTROPHILS ABSOLUTE: 7.8 K/UL (ref 1.7–7.7)
NEUTROPHILS RELATIVE PERCENT: 75.6 %
NITRITE, URINE: NEGATIVE
PDW BLD-RTO: 13 % (ref 12.4–15.4)
PH UA: 5.5 (ref 5–8)
PLATELET # BLD: 271 K/UL (ref 135–450)
PMV BLD AUTO: 8.2 FL (ref 5–10.5)
POTASSIUM REFLEX MAGNESIUM: 4.5 MMOL/L (ref 3.5–5.1)
PROTEIN UA: ABNORMAL MG/DL
RBC # BLD: 4.71 M/UL (ref 4.2–5.9)
RBC UA: ABNORMAL /HPF (ref 0–4)
SODIUM BLD-SCNC: 138 MMOL/L (ref 136–145)
SPECIFIC GRAVITY UA: 1.02 (ref 1–1.03)
TOTAL PROTEIN: 7.4 G/DL (ref 6.4–8.2)
TROPONIN: <0.01 NG/ML
URINE REFLEX TO CULTURE: YES
URINE TYPE: ABNORMAL
UROBILINOGEN, URINE: 4 E.U./DL
WBC # BLD: 10.3 K/UL (ref 4–11)
WBC UA: ABNORMAL /HPF (ref 0–5)

## 2020-10-08 PROCEDURE — 84484 ASSAY OF TROPONIN QUANT: CPT

## 2020-10-08 PROCEDURE — 87086 URINE CULTURE/COLONY COUNT: CPT

## 2020-10-08 PROCEDURE — 1200000000 HC SEMI PRIVATE

## 2020-10-08 PROCEDURE — 81001 URINALYSIS AUTO W/SCOPE: CPT

## 2020-10-08 PROCEDURE — 74176 CT ABD & PELVIS W/O CONTRAST: CPT

## 2020-10-08 PROCEDURE — 80053 COMPREHEN METABOLIC PANEL: CPT

## 2020-10-08 PROCEDURE — 99285 EMERGENCY DEPT VISIT HI MDM: CPT

## 2020-10-08 PROCEDURE — 83690 ASSAY OF LIPASE: CPT

## 2020-10-08 PROCEDURE — 85025 COMPLETE CBC W/AUTO DIFF WBC: CPT

## 2020-10-08 NOTE — LETTER
[address] [facility]  [address] [address]  Phone: [phone]    October 12, 2020     Patient: Brock Etienne   YOB: 1960   Date of Visit: 10/8/2020       To Whom It May Concern:    Mr. Brock Etienne has been receiving treatment at the [facility] since 10/08/20. We anticipate that he will remain at the hospital until at least 10/13/20. He may have some restrictions upon return to work, which will be decided at the time of discharge. If you have any questions or concerns, please don't hesitate to call.     Sincerely,          Dr. Mai Bales DO, PhD

## 2020-10-08 NOTE — LETTER
[address] [facility]  [address]  Phone: [phone]    No name on file. October 20, 2020     Patient: Carline Sever   YOB: 1960   Date of Visit: 10/8/2020       To Whom It May Concern: It is my medical opinion that Carline Sever should remain out of work until until November 2. He has been under our care since 10/9/20. Please call the office for questions. [phone].  (Dr. Wharton's office)    If you have any questions or concerns, please don't hesitate to call. Sincerely,    SYDNEE Calderon  Resident Support Staff  [phone]      No name on file.

## 2020-10-09 ENCOUNTER — APPOINTMENT (OUTPATIENT)
Dept: GENERAL RADIOLOGY | Age: 60
DRG: 417 | End: 2020-10-09
Payer: COMMERCIAL

## 2020-10-09 ENCOUNTER — APPOINTMENT (OUTPATIENT)
Dept: ULTRASOUND IMAGING | Age: 60
DRG: 417 | End: 2020-10-09
Payer: COMMERCIAL

## 2020-10-09 ENCOUNTER — ANESTHESIA (OUTPATIENT)
Dept: ENDOSCOPY | Age: 60
DRG: 417 | End: 2020-10-09
Payer: COMMERCIAL

## 2020-10-09 ENCOUNTER — ANESTHESIA EVENT (OUTPATIENT)
Dept: ENDOSCOPY | Age: 60
DRG: 417 | End: 2020-10-09
Payer: COMMERCIAL

## 2020-10-09 VITALS
RESPIRATION RATE: 14 BRPM | OXYGEN SATURATION: 99 % | DIASTOLIC BLOOD PRESSURE: 59 MMHG | TEMPERATURE: 96.8 F | SYSTOLIC BLOOD PRESSURE: 85 MMHG

## 2020-10-09 PROBLEM — R94.31 ABNORMAL EKG: Status: ACTIVE | Noted: 2020-10-09

## 2020-10-09 PROBLEM — Z01.818 PRE-OP EVALUATION: Status: ACTIVE | Noted: 2020-10-09

## 2020-10-09 LAB
A/G RATIO: 1.3 (ref 1.1–2.2)
ALBUMIN SERPL-MCNC: 3.5 G/DL (ref 3.4–5)
ALP BLD-CCNC: 495 U/L (ref 40–129)
ALT SERPL-CCNC: 312 U/L (ref 10–40)
ANION GAP SERPL CALCULATED.3IONS-SCNC: 10 MMOL/L (ref 3–16)
AST SERPL-CCNC: 215 U/L (ref 15–37)
BILIRUB SERPL-MCNC: 2.6 MG/DL (ref 0–1)
BUN BLDV-MCNC: 17 MG/DL (ref 7–20)
CALCIUM SERPL-MCNC: 9 MG/DL (ref 8.3–10.6)
CHLORIDE BLD-SCNC: 101 MMOL/L (ref 99–110)
CO2: 25 MMOL/L (ref 21–32)
CREAT SERPL-MCNC: 1 MG/DL (ref 0.8–1.3)
EKG ATRIAL RATE: 51 BPM
EKG ATRIAL RATE: 55 BPM
EKG DIAGNOSIS: NORMAL
EKG DIAGNOSIS: NORMAL
EKG P AXIS: 144 DEGREES
EKG P AXIS: 69 DEGREES
EKG P-R INTERVAL: 142 MS
EKG P-R INTERVAL: 144 MS
EKG Q-T INTERVAL: 420 MS
EKG Q-T INTERVAL: 420 MS
EKG QRS DURATION: 96 MS
EKG QRS DURATION: 98 MS
EKG QTC CALCULATION (BAZETT): 387 MS
EKG QTC CALCULATION (BAZETT): 401 MS
EKG R AXIS: 157 DEGREES
EKG R AXIS: 96 DEGREES
EKG T AXIS: 180 DEGREES
EKG T AXIS: 81 DEGREES
EKG VENTRICULAR RATE: 51 BPM
EKG VENTRICULAR RATE: 55 BPM
GFR AFRICAN AMERICAN: >60
GFR NON-AFRICAN AMERICAN: >60
GLOBULIN: 2.6 G/DL
GLUCOSE BLD-MCNC: 189 MG/DL (ref 70–99)
GLUCOSE BLD-MCNC: 200 MG/DL (ref 70–99)
GLUCOSE BLD-MCNC: 223 MG/DL (ref 70–99)
GLUCOSE BLD-MCNC: 223 MG/DL (ref 70–99)
GLUCOSE BLD-MCNC: 237 MG/DL (ref 70–99)
GLUCOSE BLD-MCNC: 287 MG/DL (ref 70–99)
HAV IGM SER IA-ACNC: NORMAL
HAV IGM SER IA-ACNC: NORMAL
HEPATITIS B CORE IGM ANTIBODY: NORMAL
HEPATITIS B CORE IGM ANTIBODY: NORMAL
HEPATITIS B SURFACE ANTIGEN INTERPRETATION: NORMAL
HEPATITIS B SURFACE ANTIGEN INTERPRETATION: NORMAL
HEPATITIS C ANTIBODY INTERPRETATION: NORMAL
HEPATITIS C ANTIBODY INTERPRETATION: NORMAL
PERFORMED ON: ABNORMAL
POTASSIUM REFLEX MAGNESIUM: 3.9 MMOL/L (ref 3.5–5.1)
SODIUM BLD-SCNC: 136 MMOL/L (ref 136–145)
TOTAL PROTEIN: 6.1 G/DL (ref 6.4–8.2)

## 2020-10-09 PROCEDURE — 76705 ECHO EXAM OF ABDOMEN: CPT

## 2020-10-09 PROCEDURE — 2580000003 HC RX 258: Performed by: STUDENT IN AN ORGANIZED HEALTH CARE EDUCATION/TRAINING PROGRAM

## 2020-10-09 PROCEDURE — 80053 COMPREHEN METABOLIC PANEL: CPT

## 2020-10-09 PROCEDURE — 2580000003 HC RX 258: Performed by: INTERNAL MEDICINE

## 2020-10-09 PROCEDURE — 6360000002 HC RX W HCPCS: Performed by: STUDENT IN AN ORGANIZED HEALTH CARE EDUCATION/TRAINING PROGRAM

## 2020-10-09 PROCEDURE — 3609015200 HC ERCP REMOVE CALCULI/DEBRIS BILIARY/PANCREAS DUCT: Performed by: INTERNAL MEDICINE

## 2020-10-09 PROCEDURE — 36415 COLL VENOUS BLD VENIPUNCTURE: CPT

## 2020-10-09 PROCEDURE — 99254 IP/OBS CNSLTJ NEW/EST MOD 60: CPT | Performed by: INTERNAL MEDICINE

## 2020-10-09 PROCEDURE — 0DJ08ZZ INSPECTION OF UPPER INTESTINAL TRACT, VIA NATURAL OR ARTIFICIAL OPENING ENDOSCOPIC: ICD-10-PCS | Performed by: INTERNAL MEDICINE

## 2020-10-09 PROCEDURE — 99222 1ST HOSP IP/OBS MODERATE 55: CPT | Performed by: SURGERY

## 2020-10-09 PROCEDURE — 3609014900 HC ERCP W/SPHINCTEROTOMY &/OR PAPILLOTOMY: Performed by: INTERNAL MEDICINE

## 2020-10-09 PROCEDURE — 93010 ELECTROCARDIOGRAM REPORT: CPT | Performed by: INTERNAL MEDICINE

## 2020-10-09 PROCEDURE — 7100000001 HC PACU RECOVERY - ADDTL 15 MIN: Performed by: INTERNAL MEDICINE

## 2020-10-09 PROCEDURE — 6360000004 HC RX CONTRAST MEDICATION: Performed by: INTERNAL MEDICINE

## 2020-10-09 PROCEDURE — 2709999900 HC NON-CHARGEABLE SUPPLY: Performed by: INTERNAL MEDICINE

## 2020-10-09 PROCEDURE — 6360000002 HC RX W HCPCS: Performed by: NURSE ANESTHETIST, CERTIFIED REGISTERED

## 2020-10-09 PROCEDURE — 93005 ELECTROCARDIOGRAM TRACING: CPT | Performed by: STUDENT IN AN ORGANIZED HEALTH CARE EDUCATION/TRAINING PROGRAM

## 2020-10-09 PROCEDURE — C1769 GUIDE WIRE: HCPCS | Performed by: INTERNAL MEDICINE

## 2020-10-09 PROCEDURE — BF111ZZ FLUOROSCOPY OF BILIARY AND PANCREATIC DUCTS USING LOW OSMOLAR CONTRAST: ICD-10-PCS | Performed by: INTERNAL MEDICINE

## 2020-10-09 PROCEDURE — 6370000000 HC RX 637 (ALT 250 FOR IP): Performed by: STUDENT IN AN ORGANIZED HEALTH CARE EDUCATION/TRAINING PROGRAM

## 2020-10-09 PROCEDURE — 3700000000 HC ANESTHESIA ATTENDED CARE: Performed by: INTERNAL MEDICINE

## 2020-10-09 PROCEDURE — 2580000003 HC RX 258: Performed by: NURSE ANESTHETIST, CERTIFIED REGISTERED

## 2020-10-09 PROCEDURE — 3700000001 HC ADD 15 MINUTES (ANESTHESIA): Performed by: INTERNAL MEDICINE

## 2020-10-09 PROCEDURE — 6360000002 HC RX W HCPCS: Performed by: INTERNAL MEDICINE

## 2020-10-09 PROCEDURE — 80074 ACUTE HEPATITIS PANEL: CPT

## 2020-10-09 PROCEDURE — 2500000003 HC RX 250 WO HCPCS: Performed by: NURSE ANESTHETIST, CERTIFIED REGISTERED

## 2020-10-09 PROCEDURE — 93005 ELECTROCARDIOGRAM TRACING: CPT | Performed by: INTERNAL MEDICINE

## 2020-10-09 PROCEDURE — 3609017100 HC EGD: Performed by: INTERNAL MEDICINE

## 2020-10-09 PROCEDURE — 7100000000 HC PACU RECOVERY - FIRST 15 MIN: Performed by: INTERNAL MEDICINE

## 2020-10-09 PROCEDURE — 1200000000 HC SEMI PRIVATE

## 2020-10-09 PROCEDURE — 0FC98ZZ EXTIRPATION OF MATTER FROM COMMON BILE DUCT, VIA NATURAL OR ARTIFICIAL OPENING ENDOSCOPIC: ICD-10-PCS | Performed by: INTERNAL MEDICINE

## 2020-10-09 PROCEDURE — 74330 X-RAY BILE/PANC ENDOSCOPY: CPT

## 2020-10-09 PROCEDURE — 2720000010 HC SURG SUPPLY STERILE: Performed by: INTERNAL MEDICINE

## 2020-10-09 RX ORDER — DEXTROSE MONOHYDRATE 25 G/50ML
12.5 INJECTION, SOLUTION INTRAVENOUS PRN
Status: DISCONTINUED | OUTPATIENT
Start: 2020-10-09 | End: 2020-10-20 | Stop reason: HOSPADM

## 2020-10-09 RX ORDER — MIDAZOLAM HYDROCHLORIDE 1 MG/ML
INJECTION INTRAMUSCULAR; INTRAVENOUS PRN
Status: DISCONTINUED | OUTPATIENT
Start: 2020-10-09 | End: 2020-10-09 | Stop reason: SDUPTHER

## 2020-10-09 RX ORDER — ROCURONIUM BROMIDE 10 MG/ML
INJECTION, SOLUTION INTRAVENOUS PRN
Status: DISCONTINUED | OUTPATIENT
Start: 2020-10-09 | End: 2020-10-09 | Stop reason: SDUPTHER

## 2020-10-09 RX ORDER — DEXTROSE MONOHYDRATE 50 MG/ML
100 INJECTION, SOLUTION INTRAVENOUS PRN
Status: DISCONTINUED | OUTPATIENT
Start: 2020-10-09 | End: 2020-10-20 | Stop reason: HOSPADM

## 2020-10-09 RX ORDER — SODIUM CHLORIDE 0.9 % (FLUSH) 0.9 %
10 SYRINGE (ML) INJECTION PRN
Status: DISCONTINUED | OUTPATIENT
Start: 2020-10-09 | End: 2020-10-20 | Stop reason: HOSPADM

## 2020-10-09 RX ORDER — ONDANSETRON 2 MG/ML
4 INJECTION INTRAMUSCULAR; INTRAVENOUS
Status: CANCELLED | OUTPATIENT
Start: 2020-10-09 | End: 2020-10-09

## 2020-10-09 RX ORDER — SODIUM CHLORIDE, SODIUM LACTATE, POTASSIUM CHLORIDE, CALCIUM CHLORIDE 600; 310; 30; 20 MG/100ML; MG/100ML; MG/100ML; MG/100ML
INJECTION, SOLUTION INTRAVENOUS CONTINUOUS
Status: DISCONTINUED | OUTPATIENT
Start: 2020-10-09 | End: 2020-10-10

## 2020-10-09 RX ORDER — ENALAPRILAT 2.5 MG/2ML
1.25 INJECTION INTRAVENOUS
Status: CANCELLED | OUTPATIENT
Start: 2020-10-09 | End: 2020-10-09

## 2020-10-09 RX ORDER — INSULIN LISPRO 100 [IU]/ML
0-3 INJECTION, SOLUTION INTRAVENOUS; SUBCUTANEOUS NIGHTLY
Status: DISCONTINUED | OUTPATIENT
Start: 2020-10-09 | End: 2020-10-09

## 2020-10-09 RX ORDER — PROMETHAZINE HYDROCHLORIDE 25 MG/1
12.5 TABLET ORAL EVERY 6 HOURS PRN
Status: DISCONTINUED | OUTPATIENT
Start: 2020-10-09 | End: 2020-10-20 | Stop reason: HOSPADM

## 2020-10-09 RX ORDER — LIDOCAINE HYDROCHLORIDE 20 MG/ML
INJECTION, SOLUTION INTRAVENOUS PRN
Status: DISCONTINUED | OUTPATIENT
Start: 2020-10-09 | End: 2020-10-09 | Stop reason: SDUPTHER

## 2020-10-09 RX ORDER — ACETAMINOPHEN 650 MG/1
650 SUPPOSITORY RECTAL EVERY 6 HOURS PRN
Status: DISCONTINUED | OUTPATIENT
Start: 2020-10-09 | End: 2020-10-09

## 2020-10-09 RX ORDER — SUCCINYLCHOLINE CHLORIDE 20 MG/ML
INJECTION INTRAMUSCULAR; INTRAVENOUS PRN
Status: DISCONTINUED | OUTPATIENT
Start: 2020-10-09 | End: 2020-10-09 | Stop reason: SDUPTHER

## 2020-10-09 RX ORDER — NICOTINE POLACRILEX 4 MG
15 LOZENGE BUCCAL PRN
Status: DISCONTINUED | OUTPATIENT
Start: 2020-10-09 | End: 2020-10-20 | Stop reason: HOSPADM

## 2020-10-09 RX ORDER — PROPOFOL 10 MG/ML
INJECTION, EMULSION INTRAVENOUS PRN
Status: DISCONTINUED | OUTPATIENT
Start: 2020-10-09 | End: 2020-10-09 | Stop reason: SDUPTHER

## 2020-10-09 RX ORDER — HYDRALAZINE HYDROCHLORIDE 20 MG/ML
5 INJECTION INTRAMUSCULAR; INTRAVENOUS EVERY 5 MIN PRN
Status: CANCELLED | OUTPATIENT
Start: 2020-10-09

## 2020-10-09 RX ORDER — PROPOFOL 10 MG/ML
INJECTION, EMULSION INTRAVENOUS CONTINUOUS PRN
Status: DISCONTINUED | OUTPATIENT
Start: 2020-10-09 | End: 2020-10-09 | Stop reason: SDUPTHER

## 2020-10-09 RX ORDER — INSULIN LISPRO 100 [IU]/ML
0-6 INJECTION, SOLUTION INTRAVENOUS; SUBCUTANEOUS EVERY 4 HOURS
Status: DISCONTINUED | OUTPATIENT
Start: 2020-10-09 | End: 2020-10-09

## 2020-10-09 RX ORDER — ACETAMINOPHEN 325 MG/1
650 TABLET ORAL EVERY 6 HOURS PRN
Status: DISCONTINUED | OUTPATIENT
Start: 2020-10-09 | End: 2020-10-09

## 2020-10-09 RX ORDER — SODIUM CHLORIDE, SODIUM LACTATE, POTASSIUM CHLORIDE, CALCIUM CHLORIDE 600; 310; 30; 20 MG/100ML; MG/100ML; MG/100ML; MG/100ML
INJECTION, SOLUTION INTRAVENOUS CONTINUOUS PRN
Status: DISCONTINUED | OUTPATIENT
Start: 2020-10-09 | End: 2020-10-09 | Stop reason: SDUPTHER

## 2020-10-09 RX ORDER — INSULIN LISPRO 100 [IU]/ML
0-6 INJECTION, SOLUTION INTRAVENOUS; SUBCUTANEOUS NIGHTLY
Status: DISCONTINUED | OUTPATIENT
Start: 2020-10-09 | End: 2020-10-11

## 2020-10-09 RX ORDER — INSULIN LISPRO 100 [IU]/ML
0-6 INJECTION, SOLUTION INTRAVENOUS; SUBCUTANEOUS
Status: DISCONTINUED | OUTPATIENT
Start: 2020-10-09 | End: 2020-10-09

## 2020-10-09 RX ORDER — LABETALOL 20 MG/4 ML (5 MG/ML) INTRAVENOUS SYRINGE
5 EVERY 10 MIN PRN
Status: CANCELLED | OUTPATIENT
Start: 2020-10-09

## 2020-10-09 RX ORDER — SODIUM CHLORIDE 9 MG/ML
INJECTION, SOLUTION INTRAVENOUS CONTINUOUS
Status: DISCONTINUED | OUTPATIENT
Start: 2020-10-09 | End: 2020-10-09

## 2020-10-09 RX ORDER — FENTANYL CITRATE 50 UG/ML
50 INJECTION, SOLUTION INTRAMUSCULAR; INTRAVENOUS EVERY 5 MIN PRN
Status: CANCELLED | OUTPATIENT
Start: 2020-10-09

## 2020-10-09 RX ORDER — INSULIN LISPRO 100 [IU]/ML
0-12 INJECTION, SOLUTION INTRAVENOUS; SUBCUTANEOUS
Status: DISCONTINUED | OUTPATIENT
Start: 2020-10-10 | End: 2020-10-11

## 2020-10-09 RX ORDER — ONDANSETRON 2 MG/ML
4 INJECTION INTRAMUSCULAR; INTRAVENOUS EVERY 6 HOURS PRN
Status: DISCONTINUED | OUTPATIENT
Start: 2020-10-09 | End: 2020-10-20 | Stop reason: HOSPADM

## 2020-10-09 RX ORDER — POLYETHYLENE GLYCOL 3350 17 G/17G
17 POWDER, FOR SOLUTION ORAL DAILY PRN
Status: DISCONTINUED | OUTPATIENT
Start: 2020-10-09 | End: 2020-10-13

## 2020-10-09 RX ORDER — FENTANYL CITRATE 50 UG/ML
INJECTION, SOLUTION INTRAMUSCULAR; INTRAVENOUS PRN
Status: DISCONTINUED | OUTPATIENT
Start: 2020-10-09 | End: 2020-10-09 | Stop reason: SDUPTHER

## 2020-10-09 RX ORDER — FENTANYL CITRATE 50 UG/ML
25 INJECTION, SOLUTION INTRAMUSCULAR; INTRAVENOUS EVERY 5 MIN PRN
Status: CANCELLED | OUTPATIENT
Start: 2020-10-09

## 2020-10-09 RX ORDER — SODIUM CHLORIDE 0.9 % (FLUSH) 0.9 %
10 SYRINGE (ML) INJECTION EVERY 12 HOURS SCHEDULED
Status: DISCONTINUED | OUTPATIENT
Start: 2020-10-09 | End: 2020-10-20 | Stop reason: HOSPADM

## 2020-10-09 RX ADMIN — MIDAZOLAM HYDROCHLORIDE 2 MG: 2 INJECTION, SOLUTION INTRAMUSCULAR; INTRAVENOUS at 16:31

## 2020-10-09 RX ADMIN — ENOXAPARIN SODIUM 40 MG: 40 INJECTION SUBCUTANEOUS at 09:58

## 2020-10-09 RX ADMIN — PIPERACILLIN AND TAZOBACTAM 3.38 G: 3; .375 INJECTION, POWDER, LYOPHILIZED, FOR SOLUTION INTRAVENOUS at 09:58

## 2020-10-09 RX ADMIN — PROPOFOL 100 MCG/KG/MIN: 10 INJECTION, EMULSION INTRAVENOUS at 16:47

## 2020-10-09 RX ADMIN — INSULIN LISPRO 2 UNITS: 100 INJECTION, SOLUTION INTRAVENOUS; SUBCUTANEOUS at 02:04

## 2020-10-09 RX ADMIN — SODIUM CHLORIDE, SODIUM LACTATE, POTASSIUM CHLORIDE, AND CALCIUM CHLORIDE: 600; 310; 30; 20 INJECTION, SOLUTION INTRAVENOUS at 16:19

## 2020-10-09 RX ADMIN — SODIUM CHLORIDE, POTASSIUM CHLORIDE, SODIUM LACTATE AND CALCIUM CHLORIDE: 600; 310; 30; 20 INJECTION, SOLUTION INTRAVENOUS at 06:28

## 2020-10-09 RX ADMIN — INSULIN LISPRO 3 UNITS: 100 INJECTION, SOLUTION INTRAVENOUS; SUBCUTANEOUS at 21:07

## 2020-10-09 RX ADMIN — ROCURONIUM BROMIDE 30 MG: 10 INJECTION INTRAVENOUS at 16:46

## 2020-10-09 RX ADMIN — SODIUM CHLORIDE: 9 INJECTION, SOLUTION INTRAVENOUS at 01:42

## 2020-10-09 RX ADMIN — FENTANYL CITRATE 100 MCG: 50 INJECTION INTRAMUSCULAR; INTRAVENOUS at 16:31

## 2020-10-09 RX ADMIN — PIPERACILLIN AND TAZOBACTAM 3.38 G: 3; .375 INJECTION, POWDER, LYOPHILIZED, FOR SOLUTION INTRAVENOUS at 21:06

## 2020-10-09 RX ADMIN — LIDOCAINE HYDROCHLORIDE 80 MG: 20 INJECTION, SOLUTION INTRAVENOUS at 16:31

## 2020-10-09 RX ADMIN — SUCCINYLCHOLINE CHLORIDE 100 MG: 20 INJECTION, SOLUTION INTRAMUSCULAR; INTRAVENOUS; PARENTERAL at 16:31

## 2020-10-09 RX ADMIN — Medication 10 ML: at 09:58

## 2020-10-09 RX ADMIN — PROPOFOL 200 MG: 10 INJECTION, EMULSION INTRAVENOUS at 16:31

## 2020-10-09 ASSESSMENT — PULMONARY FUNCTION TESTS
PIF_VALUE: 19
PIF_VALUE: 20
PIF_VALUE: 14
PIF_VALUE: 18
PIF_VALUE: 16
PIF_VALUE: 2
PIF_VALUE: 13
PIF_VALUE: 18
PIF_VALUE: 16
PIF_VALUE: 1
PIF_VALUE: 16
PIF_VALUE: 0
PIF_VALUE: 19
PIF_VALUE: 16
PIF_VALUE: 19
PIF_VALUE: 0
PIF_VALUE: 65
PIF_VALUE: 14
PIF_VALUE: 16
PIF_VALUE: 21
PIF_VALUE: 8
PIF_VALUE: 1
PIF_VALUE: 2
PIF_VALUE: 2
PIF_VALUE: 16
PIF_VALUE: 16
PIF_VALUE: 29
PIF_VALUE: 0
PIF_VALUE: 15
PIF_VALUE: 16
PIF_VALUE: 19
PIF_VALUE: 19
PIF_VALUE: 14
PIF_VALUE: 19
PIF_VALUE: 0
PIF_VALUE: 23
PIF_VALUE: 16
PIF_VALUE: 15
PIF_VALUE: 16
PIF_VALUE: 19
PIF_VALUE: 12
PIF_VALUE: 16
PIF_VALUE: 15
PIF_VALUE: 16
PIF_VALUE: 16
PIF_VALUE: 17
PIF_VALUE: 15
PIF_VALUE: 16
PIF_VALUE: 13
PIF_VALUE: 0
PIF_VALUE: 0
PIF_VALUE: 2
PIF_VALUE: 16
PIF_VALUE: 15
PIF_VALUE: 13
PIF_VALUE: 1
PIF_VALUE: 1
PIF_VALUE: 22
PIF_VALUE: 16
PIF_VALUE: 21
PIF_VALUE: 2
PIF_VALUE: 16
PIF_VALUE: 4
PIF_VALUE: 15
PIF_VALUE: 15
PIF_VALUE: 1
PIF_VALUE: 14
PIF_VALUE: 16
PIF_VALUE: 0
PIF_VALUE: 17

## 2020-10-09 ASSESSMENT — ENCOUNTER SYMPTOMS
DIARRHEA: 1
RESPIRATORY NEGATIVE: 1
GASTROINTESTINAL NEGATIVE: 1
NAUSEA: 1
ABDOMINAL PAIN: 1
VOMITING: 1
ALLERGIC/IMMUNOLOGIC NEGATIVE: 1

## 2020-10-09 ASSESSMENT — PAIN SCALES - GENERAL
PAINLEVEL_OUTOF10: 4
PAINLEVEL_OUTOF10: 0
PAINLEVEL_OUTOF10: 0

## 2020-10-09 ASSESSMENT — PAIN DESCRIPTION - PAIN TYPE: TYPE: ACUTE PAIN

## 2020-10-09 NOTE — ANESTHESIA PRE PROCEDURE
Department of Anesthesiology  Preprocedure Note       Name:  Melva Villarreal   Age:  56 y.o.  :  1960                                          MRN:  [id number]         Date:  10/9/2020      Surgeon: Richelle Abel):  Elsie Tafoya MD    Procedure: Procedure(s):  ERCP DIAGNOSTIC    Medications prior to admission:   Prior to Admission medications    Medication Sig Start Date End Date Taking?  Authorizing Provider   metFORMIN (GLUCOPHAGE) 1000 MG tablet TAKE 1 TABLET BY MOUTH TWICE DAILY WITH MEALS FOR DIABETES 20  Yes Therese Gomez MD   Multiple Vitamins-Minerals (THERAPEUTIC MULTIVITAMIN-MINERALS) tablet Take 1 tablet by mouth daily   Yes Historical Provider, MD       Current medications:    Current Facility-Administered Medications   Medication Dose Route Frequency Provider Last Rate Last Dose    sodium chloride flush 0.9 % injection 10 mL  10 mL Intravenous 2 times per day Cassandra Bhandari MD   10 mL at 10/09/20 0958    sodium chloride flush 0.9 % injection 10 mL  10 mL Intravenous PRN Cassandra Bhandari MD        acetaminophen (TYLENOL) tablet 650 mg  650 mg Oral Q6H PRN Cassandra Bhandari MD        Or    acetaminophen (TYLENOL) suppository 650 mg  650 mg Rectal Q6H PRN Cassandra Bhandari MD        polyethylene glycol (GLYCOLAX) packet 17 g  17 g Oral Daily PRN Cassandra Bhandari MD        promethazine (PHENERGAN) tablet 12.5 mg  12.5 mg Oral Q6H PRN Cassandra Bhandari MD        Or    ondansetron [facility]) injection 4 mg  4 mg Intravenous Q6H PRN Cassandra Bhandari MD        [Held by provider] enoxaparin (LOVENOX) injection 40 mg  40 mg Subcutaneous Daily Cassandra Bhandari MD   40 mg at 10/09/20 0958    glucose (GLUTOSE) 40 % oral gel 15 g  15 g Oral PRN Cassandra Bhandari MD        dextrose 50 % IV solution  12.5 g Intravenous PRN Cassandra Bhandari MD        glucagon (rDNA) injection 1 mg  1 mg Intramuscular PRN Cassandra Bhandari MD        dextrose 5 % solution  100 mL/hr Intravenous PRN Cassandra Bhandari MD        dextrose 50 % IV solution  12.5 g Intravenous PRN Garcia Marin MD       Elva Curl [START ON 10/10/2020] influenza quadrivalent split vaccine (FLUZONE;FLUARIX;FLULAVAL;AFLURIA) injection 0.5 mL  0.5 mL Intramuscular Once Iris Elias Lanza DO        lactated ringers infusion   Intravenous Continuous Zee Ward  mL/hr at 10/09/20 0628      piperacillin-tazobactam (ZOSYN) 3.375 g in dextrose 5 % 100 mL IVPB extended infusion (mini-bag)  3.375 g Intravenous Marquez MD Sapna 25 mL/hr at 10/09/20 0958 3.375 g at 10/09/20 0958    insulin lispro (1 Unit Dial) 0-6 Units  0-6 Units Subcutaneous TID  Shira Allen MD        insulin lispro (1 Unit Dial) 0-3 Units  0-3 Units Subcutaneous Nightly Shira Allen MD           Allergies: Allergies   Allergen Reactions    Shellfish-Derived Products Anaphylaxis     Throat swelling    Ibuprofen Swelling     swelling       Problem List:    Patient Active Problem List   Diagnosis Code    Urinary frequency R35.0    Diabetes mellitus ([address] E11.9    Elevated LDL cholesterol level E78.00    Essential hypertension, benign I10    Elevated bilirubin R17    Nonspecific abnormal results of liver function study R94.5    Urinary hesitancy R39.11    Benign prostatic hyperplasia N40.0    Penile discharge R36.9    Closed displaced fracture of proximal phalanx of left ring finger S62.615A    Closed displaced fracture of middle phalanx of left little finger S62.627A    Gross hematuria R31.0    BPH with urinary obstruction N40.1, N13.8    Choledocholithiasis K80.50    Acute calculous cholecystitis K80.00    Transaminitis R74.01       Past Medical History:        Diagnosis Date    Arthritis     BPH (benign prostatic hyperplasia)     BPH (benign prostatic hypertrophy) 9/2/2015    Clostridium difficile infection 10/24/2016    Diabetes mellitus ([address] 2012    Elevated LDL cholesterol level 1/27/2015    Essential hypertension, benign 1/27/2015    S/P colonoscopy 2011    Nml per pt'.     Wears glasses        Past Surgical History:        Procedure Laterality Date    COLONOSCOPY      HAND SURGERY Left 11/10/2017    OPERATIVE FIXATION OF LEFT RING FINGER PROXIMAL PHALANX AND LEFT SMALL FINGER MIDDLE PHALANX FRACTURES. INCLUDING OPEN REDUCTION INTERNAL FIXATION    LAPAROSCOPIC APPENDECTOMY      TURP N/A 2019    CYSTOSCOPY TRANSURETHRAL RESECTION PROSTATE performed by Alejandra Larios DO at [address] [place] [address] History:    Social History     Tobacco Use    Smoking status: Former Smoker     Packs/day: 0.25     Years: 10.00     Pack years: 2.50     Types: Cigarettes     Last attempt to quit: 2019     Years since quittin.1    Smokeless tobacco: Never Used   Substance Use Topics    Alcohol use: Yes     Alcohol/week: 0.0 standard drinks     Comment: occasionally                                Counseling given: Not Answered      Vital Signs (Current):   Vitals:    10/08/20 1934 10/08/20 2200 10/09/20 0013 10/09/20 08   BP: 129/70 130/68 132/85 132/78   Pulse: 87 82 91 68   Resp: 16 16 17 16   Temp: 98.5 °F (36.9 °C)  98.3 °F (36.8 °C) 98 °F (36.7 °C)   TempSrc: Oral  Oral Oral   SpO2: 100% 100% 96% 96%   Weight: 178 lb (80.7 kg)      Height: 6' (1.829 m)                                                 BP Readings from Last 3 Encounters:   10/09/20 132/78   20 128/75   19 120/72       NPO Status:                                                                                 BMI:   Wt Readings from Last 3 Encounters:   10/08/20 178 lb (80.7 kg)   20 198 lb (89.8 kg)   20 198 lb 3.2 oz (89.9 kg)     Body mass index is 24.14 kg/m².     CBC:   Lab Results   Component Value Date    WBC 10.3 10/08/2020    RBC 4.71 10/08/2020    HGB 14.2 10/08/2020    HCT 42.7 10/08/2020    MCV 90.6 10/08/2020    RDW 13.0 10/08/2020     10/08/2020       CMP:   Lab Results   Component Value Date     10/09/2020    K 3.9 10/09/2020     10/09/2020    CO2 25 10/09/2020    BUN 17

## 2020-10-09 NOTE — H&P
Internal Medicine PGY-3 Resident History & Physical      PCP: Tabatha Rasmussen MD    Date of Admission: 10/8/2020    Date ofService: Pt seen/examined on 10/9/2020 and Admitted to Inpatient with expected LOS greater than two midnights due to medical therapy. Chief Complaint:  Abdominal pain; nausea/vomiting      History Of Present Illness: Barbara Almonte is a 56 y.o. male with a past medical history of DM2 who presented to [facility] as a transfer from [facility] for suspected choledocholithiasis. The patient initially presented to the emergency department complaining of abdominal pain associated with nausea and vomiting. He reports that the pain began approximately three-days-ago and has gotten progressively worse. The describes the pain as a \"stabbing\" sensation (10/10 intensity) that is localized to the epigastric region. The patient states that the pain waxes and wanes throughout the day and is exacerbated by eating. In the ED, the patient was afebrile and nontoxic appearing. CBC, lipase and troponin were drawn and were within normal limits. CMP was obtained and was remarkable for transaminitis (, ) as well as bilirubin elevation (total bilirubin 3.2). CT scan of the abdomen and pelvis was done and was significant for a contracted gallbladder with questionable wall thickening and small nonspecific densities along the fundus. The patient is admitted to internal medicine for further management of suspected choledocholithiasis. Past Medical History:          Diagnosis Date    Arthritis     BPH (benign prostatic hyperplasia)     BPH (benign prostatic hypertrophy) 9/2/2015    Clostridium difficile infection 10/24/2016    Diabetes mellitus ([facility] [address] 2012    Elevated LDL cholesterol level 1/27/2015    Essential hypertension, benign 1/27/2015    S/P colonoscopy 2011    Nml per pt'.     Wears glasses        Past Surgical History:          Procedure Laterality Date    COLONOSCOPY      HAND SURGERY Left 11/10/2017    OPERATIVE FIXATION OF LEFT RING FINGER PROXIMAL PHALANX AND LEFT SMALL FINGER MIDDLE PHALANX FRACTURES. INCLUDING OPEN REDUCTION INTERNAL FIXATION    LAPAROSCOPIC APPENDECTOMY      TURP N/A 12/6/2019    CYSTOSCOPY TRANSURETHRAL RESECTION PROSTATE performed by Maricruz Bowens DO at [facility]       Medications Prior to Admission:      Prior to Admission medications    Medication Sig Start Date End Date Taking? Authorizing Provider   metFORMIN (GLUCOPHAGE) 1000 MG tablet TAKE 1 TABLET BY MOUTH TWICE DAILY WITH MEALS FOR DIABETES 4/13/20  Yes Stefanie Ray MD   Multiple Vitamins-Minerals (THERAPEUTIC MULTIVITAMIN-MINERALS) tablet Take 1 tablet by mouth daily   Yes Historical Provider, MD       Allergies: Shellfish-derived products and Ibuprofen    Social History:      The patient currently lives in a private residence. TOBACCO:   reports that he quit smoking about 13 months ago. His smoking use included cigarettes. He has a 2.50 pack-year smoking history. He has never used smokeless tobacco.  ETOH:   reports current alcohol use. Family History:      Reviewed in detail and negative for DM, CAD, Cancer, CVA. Positive as follows:        Problem Relation Age of Onset    Diabetes Mother     High Blood Pressure Mother     Diabetes Father     Cancer Neg Hx     Asthma Neg Hx     Heart Failure Neg Hx     High Cholesterol Neg Hx     Hypertension Neg Hx     Migraines Neg Hx     Rashes/Skin Problems Neg Hx     Seizures Neg Hx     Stroke Neg Hx     Thyroid Disease Neg Hx        REVIEW OF SYSTEMS:   Pertinent positives as noted in theHPI. All other systems reviewed and negative. ROS: Review of Systems   Constitutional: Positive for appetite change. HENT: Negative. Respiratory: Negative. Cardiovascular: Negative. Gastrointestinal: Positive for abdominal pain, diarrhea, nausea and vomiting. Endocrine: Negative. Genitourinary: Negative.     Musculoskeletal: Negative. Allergic/Immunologic: Negative. Neurological: Negative. Hematological: Negative. Psychiatric/Behavioral: Negative. PHYSICAL EXAM PERFORMED:    /85   Pulse 91   Temp 98.3 °F (36.8 °C) (Oral)   Resp 17   Ht 6' (1.829 m)   Wt 178 lb (80.7 kg)   SpO2 96%   BMI 24.14 kg/m²     General appearance:  No apparent distress, appears stated age and cooperative. HEENT:  Normal cephalic, atraumatic without obviousdeformity. Pupils equal, round, and reactive to light. Extra ocular muscles intact. Conjunctivae/corneas clear. Neck: Supple, with full range of motion. No jugular venous distention. Trachea midline. Respiratory:Normal respiratory effort. Clear to auscultation, bilaterally without Rales/Wheezes/Rhonchi. Cardiovascular:Regular rate and rhythm with normal S1/S2 without murmurs, rubs or gallops. Abdomen: Tender to palpation in the epigastric region. Negative Layton sign  Musculoskeletal:  No clubbing, cyanosis or edema bilaterally. Fullrange of motion without deformity. Skin: Skin color, texture, turgor normal.  No rashes or lesions. Neurologic:  Neurovascularly intact without any focal sensory/motor deficits. Cranial nerves: II-XII intact, grosslynon-focal.  Psychiatric:  Alert and oriented, thought content appropriate, normal insight  Capillary Refill: Brisk,< 3 seconds   Peripheral Pulses: +2 palpable, equal bilaterally       Labs:     Recent Labs     10/08/20  1953   WBC 10.3   HGB 14.2   HCT 42.7        Recent Labs     10/08/20  1953      K 4.5   CL 99   CO2 28   BUN 22*   CREATININE 1.2   CALCIUM 10.2     Recent Labs     10/08/20  1953   *   *   BILITOT 3.2*   ALKPHOS 604*     No results for input(s): INR in the last 72 hours.   Recent Labs     10/08/20  1953   TROPONINI <0.01       Urinalysis:      Lab Results   Component Value Date    NITRU Negative 10/08/2020    WBCUA 21-50 10/08/2020    BACTERIA Rare 10/08/2020    RBCUA 3-4 10/08/2020 BLOODU TRACE-INTACT 10/08/2020    SPECGRAV 1.025 10/08/2020    GLUCOSEU >=1000 10/08/2020       Radiology:     CT abdomen: I have reviewed the CXR with the following interpretation: Contracted gallbladder with questionable wall thickening and small nonspecific densities along the fundus. EKG:  I have reviewed the EKG with the following interpretation: No EKG obtained this admission. CT ABDOMEN PELVIS WO CONTRAST Additional Contrast? None   Final Result      No acute abdominopelvic abnormality. Contracted gallbladder with questionable wall thickening and small nonspecific densities along the fundus. There also may be gallstones. As described on prior report, follow-up MRI/MRCP should be considered. No definite CT evidence of acute    cholecystitis. Prostatomegaly with circumferential bladder wall thickening, which may be secondary to bladder outlet obstruction versus cystitis. US GALLBLADDER RUQ    (Results Pending)       ASSESSMENT & PLAN:  Nanette Ashley is a 56 y.o. male with a past medical history of DM2 who presented to [facility] as a transfer from [facility] for suspected choledocholithiasis. The patient initially presented to the emergency department complaining of abdominal pain associated with nausea and vomiting. He reports that the pain began approximately three-days-ago and has gotten progressively worse. The describes the pain as a \"stabbing\" sensation (10/10 intensity) that is localized to the epigastric region. The patient states that the pain waxes and wanes throughout the day and is exacerbated by eating. In the ED, the patient was afebrile and nontoxic appearing. CBC, lipase and troponin were drawn and were within normal limits. CMP was obtained and was remarkable for transaminitis (, ) as well as bilirubin elevation (total bilirubin 3.2).  CT scan of the abdomen and pelvis was done and was significant for a contracted gallbladder with questionable wall thickening and small nonspecific densities along the fundus. The patient is admitted to internal medicine for further management of suspected choledocholithiasis. Suspected choledocholithiasis   -CMP remarkable for transaminitis (, )   -CT scan of the abdomen and pelvis showing contracted gallbladder with questionable wall thickening   -Consult gastroenterology; appreciate recommendations   -RUQ ultrasound   -General surgery following   -Trend transaminases  -PRN ondansetron   -Daily CBC  -Daily BMP  -Diet NPO     Transaminitis   -Likely secondary to biliary obstruction as above   -Hepatic function panel q12h   -Acute hepatitis panel     DM2   -Last hemoglobin A1c 6.7 (11/2019)   -Low-dose sliding scale insulin   -Hypoglycemia protocol     HTN: Essential stage 1. Controlled  Not taking any medication for it. History of elevated LDL:  Not medicated. DVT Prophylaxis: Lovenox   Diet: Diet NPO Effective Now  Code Status: Full Code    PT/OT Eval Status: Not indicated. Dispo - General medicine/surgery floors     I will discuss the patient with the senior resident and Kayleigh Cuco, Mariya Reyes MD.   Internal Medicine Resident, PGY-3  [place]    I saw and evaluated the patient independently from the resident . I discussed the care with the resident. I personally reviewed the HPI, PH, FH, SH, ROS and medications. I repeated pertinent portions of the examination and reviewed the relevant imaging and laboratory data. I agree with the findings, assessment and plan as documented. addition to: Patient is a 57-year-old male who presents with abdominal pain [facility] ED noted on CT imaging to have choledocholithiasis was transferred here for further evaluation. In the ED surgery and GI was consulted

## 2020-10-09 NOTE — ED NOTES
Report given to Abeba Liu RN at [facility]  Pt en route via [employer]     [facility]  10/08/20 9026

## 2020-10-09 NOTE — PROGRESS NOTES
Progress Note    Admit Date: 10/8/2020  Diet: Diet NPO Effective Now    CC: Abdominal pain, nausea, vomiting    Interval history: Patient is doing well this morning. He denies any abdominal pain. Reports abdominal pain is only present shortly after eating. No acute overnight events. -Right upper quadrant ultrasound and EGD today. Lap lynda tomorrow       Medications:     Scheduled Meds:   sodium chloride flush  10 mL Intravenous 2 times per day    enoxaparin  40 mg Subcutaneous Daily    insulin regular  0-18 Units Subcutaneous 4x Daily    [START ON 10/10/2020] influenza virus vaccine  0.5 mL Intramuscular Once    piperacillin-tazobactam  3.375 g Intravenous Q8H     Continuous Infusions:   dextrose      lactated ringers 125 mL/hr at 10/09/20 0628     PRN Meds:sodium chloride flush, acetaminophen **OR** acetaminophen, polyethylene glycol, promethazine **OR** ondansetron, glucose, dextrose, glucagon (rDNA), dextrose, dextrose    Objective:   Vitals:   T-max:  Patient Vitals for the past 8 hrs:   BP Temp Temp src Pulse Resp SpO2   10/09/20 0826 132/78 98 °F (36.7 °C) Oral 68 16 96 %     No intake or output data in the 24 hours ending 10/09/20 0828    Review of Systems   Constitutional: Negative. HENT: Negative. Respiratory: Negative. Cardiovascular: Negative. Gastrointestinal: Negative. Genitourinary: Negative. Musculoskeletal: Negative. Neurological: Negative. Psychiatric/Behavioral: Negative. Physical Exam  Constitutional:       General: He is not in acute distress. Appearance: Normal appearance. Cardiovascular:      Rate and Rhythm: Normal rate and regular rhythm. Pulses: Normal pulses. Heart sounds: Normal heart sounds. Pulmonary:      Effort: Pulmonary effort is normal.      Breath sounds: Normal breath sounds. Abdominal:      General: Abdomen is flat. Bowel sounds are normal.      Palpations: Abdomen is soft.       Comments: Mild tenderness in right upper quadrant   Musculoskeletal: Normal range of motion. Skin:     General: Skin is warm. Capillary Refill: Capillary refill takes less than 2 seconds. Neurological:      General: No focal deficit present. Mental Status: He is alert. Mental status is at baseline. Psychiatric:         Mood and Affect: Mood normal.         LABS:    CBC:   Recent Labs     10/08/20  1953   WBC 10.3   HGB 14.2   HCT 42.7      MCV 90.6     Renal:    Recent Labs     10/08/20  1953 10/09/20  0529    136   K 4.5 3.9   CL 99 101   CO2 28 25   BUN 22* 17   CREATININE 1.2 1.0   GLUCOSE 314* 223*   CALCIUM 10.2 9.0   ANIONGAP 11 10     Hepatic:   Recent Labs     10/08/20  1953 10/09/20  0529   * 215*   * 312*   BILITOT 3.2* 2.6*   PROT 7.4 6.1*   LABALBU 4.4 3.5   ALKPHOS 604* 495*     Troponin:   Recent Labs     10/08/20  1953   TROPONINI <0.01     BNP: No results for input(s): BNP in the last 72 hours. Lipids: No results for input(s): CHOL, HDL in the last 72 hours. Invalid input(s): LDLCALCU, TRIGLYCERIDE  ABGs:  No results for input(s): PHART, MJP7IDU, PO2ART, GYF8EOC, BEART, THGBART, U6GLWQKN, QSM1RHU in the last 72 hours. INR: No results for input(s): INR in the last 72 hours. Lactate: No results for input(s): LACTATE in the last 72 hours. Cultures:  -----------------------------------------------------------------  RAD:   CT ABDOMEN PELVIS WO CONTRAST Additional Contrast? None   Final Result      No acute abdominopelvic abnormality. Contracted gallbladder with questionable wall thickening and small nonspecific densities along the fundus. There also may be gallstones. As described on prior report, follow-up MRI/MRCP should be considered. No definite CT evidence of acute    cholecystitis. Prostatomegaly with circumferential bladder wall thickening, which may be secondary to bladder outlet obstruction versus cystitis.       US ABDOMEN LIMITED    (Results Pending) Assessment/Plan:    Acute cholecystitis   Hyperbilirubinemia/Jaundice due to Choledocholithiasis  -CMP remarkable for transaminitis, AST, ALT, ALK phos all elevated  -CT scan of the abdomen and pelvis showing contracted gallbladder with questionable wall thickening   -Consult gastroenterology; underwent ERCP  -General surgery following   - Zosyn to cover for cholecystitis, no cholangitis No increased WBC count, no fever, no tachycardia    - EGD today and lap lynda tomorrow       Type DM2   -Last hemoglobin A1c 6.7 (11/2019)   -Low-dose sliding scale insulin   -Hypoglycemia protocol     Code Status: Fill  FEN: NPO  PPX: Lovenox  DISPO: DELGADO Stephens MD, PGY-1  10/09/20  8:28 AM    This patient has been staffed and discussed with Taylor Mccoy MD.     I have personally seen and evaluated this patient. I discussed findings and management with the resident, on 10/09/20  and agree as documented in this note. And changes made to the note.      Taylor Mccoy  Hospitalist  Attending Physicia

## 2020-10-09 NOTE — CONSULTS
History of Present Illness:  Francisca Pitts is a 56 y.o. patient whom we were asked to see pre op/abn ekg/acute cholecystitis. Presents with RUQ pain for past several days. Noted elevated LFT/bili. Felt to have acute cholecystitis and is in need of urgent surgery. Has no prior cardiac hx. Very active including exercise. No exertional chest pain/sob. Denies palp/tachy/syncope. No edema. No pnd or orthopnea. Past Medical History:   has a past medical history of Arthritis, BPH (benign prostatic hyperplasia), BPH (benign prostatic hypertrophy), Clostridium difficile infection, Diabetes mellitus ([facility] [address] Elevated LDL cholesterol level, Essential hypertension, benign, S/P colonoscopy, and Wears glasses. Surgical History:   has a past surgical history that includes laparoscopic appendectomy; Hand surgery (Left, 11/10/2017); Colonoscopy; and TURP (N/A, 12/6/2019). Social History:   reports that he quit smoking about 13 months ago. His smoking use included cigarettes. He has a 2.50 pack-year smoking history. He has never used smokeless tobacco. He reports current alcohol use. He reports that he does not use drugs. Family History:  No evidence for sudden cardiac death or premature CAD    Home Medications:  Were reviewed and are listed in nursing record. and/or listed below  Prior to Admission medications    Medication Sig Start Date End Date Taking? Authorizing Provider   metFORMIN (GLUCOPHAGE) 1000 MG tablet TAKE 1 TABLET BY MOUTH TWICE DAILY WITH MEALS FOR DIABETES 4/13/20  Yes Syed Austin MD   Multiple Vitamins-Minerals (THERAPEUTIC MULTIVITAMIN-MINERALS) tablet Take 1 tablet by mouth daily   Yes Historical Provider, MD        Allergies:  Shellfish-derived products and Ibuprofen     Review of Systems:       · Constitutional: there has been no unanticipated weight loss. There's been no change in energy level, sleep pattern, or activity level. · Eyes: No visual changes or diplopia.  No scleral icterus. · ENT: No Headaches, hearing loss or vertigo. No mouth sores or sore throat. · Cardiovascular: No loss of consciousness. No hemoptysis, pleuritic pain, or phlebitis. · Respiratory: No cough or wheezing, no sputum production. No hematemesis. · Gastrointestinal: as above. · Genitourinary: No dysuria, trouble voiding, or hematuria. · Musculoskeletal:  No gait disturbance, weakness or joint complaints. · Integumentary: No rash or pruritis.   · All other systems reviewed negative as done    Physical Examination:    Vitals:    10/09/20 1158   BP: 108/70   Pulse: 58   Resp: 16   Temp: 98 °F (36.7 °C)   SpO2: 97%    Weight: 178 lb (80.7 kg)         General Appearance:  Alert, cooperative, no distress, appears stated age   Head:  Normocephalic, without obvious abnormality, atraumatic   Eyes:  PERRL, conjunctiva/corneas clear       Nose: Nares normal, no drainage or sinus tenderness   Throat: Lips, mucosa, and tongue normal   Neck: Supple, symmetrical, trachea midline       Lungs:   Clear to auscultation bilaterally, respirations unlabored   Chest Wall:  No tenderness or deformity   Heart:  Regular rate and rhythm, S1, S2 normal, no murmur, rub or gallop   Abdomen:   Soft, tender RUQ           Extremities: Extremities normal, atraumatic, no cyanosis or edema       Skin: Skin color, texture, turgor normal, no rashes or lesions   Pysch: Normal mood and affect   Neurologic: Normal gross motor and sensory exam.         Labs  CBC:   Lab Results   Component Value Date    WBC 10.3 10/08/2020    RBC 4.71 10/08/2020    HGB 14.2 10/08/2020    HCT 42.7 10/08/2020    MCV 90.6 10/08/2020    RDW 13.0 10/08/2020     10/08/2020     CMP:    Lab Results   Component Value Date     10/09/2020    K 3.9 10/09/2020     10/09/2020    CO2 25 10/09/2020    BUN 17 10/09/2020    CREATININE 1.0 10/09/2020    GFRAA >60 10/09/2020    AGRATIO 1.3 10/09/2020    LABGLOM >60 10/09/2020    GLUCOSE 223 10/09/2020    PROT 6.1 10/09/2020    CALCIUM 9.0 10/09/2020    BILITOT 2.6 10/09/2020    ALKPHOS 495 10/09/2020     10/09/2020     10/09/2020     PT/INR:  No results found for: PTINR  Lab Results   Component Value Date    TROPONINI <0.01 10/08/2020       EKG:  I have reviewed EKG with the following interpretation:  Impression:  NSR, IRBB,  septal MI Au. Assessment  Patient Active Problem List   Diagnosis    Urinary frequency    Diabetes mellitus ([facility] [address]    Elevated LDL cholesterol level    Essential hypertension, benign    Elevated bilirubin    Nonspecific abnormal results of liver function study    Urinary hesitancy    Benign prostatic hyperplasia    Penile discharge    Closed displaced fracture of proximal phalanx of left ring finger    Closed displaced fracture of middle phalanx of left little finger    Gross hematuria    BPH with urinary obstruction    Choledocholithiasis    Acute calculous cholecystitis    Transaminitis    Pre-op evaluation    Abnormal EKG         Plan:    Presents with elevated LFT/bili and RUQ pain consistent with acute cholecystitis. No previous cardiac hx. No hx MI. Very active including exercise. No exertional sob/chest pain. No CHF. EKG shows NSR, IRBBB,  septal MI Au. Reviewed previous EKGs this is similar to EKG 11/17. He is in need of Lap Sara. Since CV sx stable he would be reasonable candidate for necessary Lap Sara.

## 2020-10-09 NOTE — OP NOTE
[facility] [place]  Endoscopy Procedure Note      Susan Dodson  : 1960  MR# [id number]  [phone] (home)    Primary care physician = Delvin Cruz MD       DATE: 10/09/20    PROCEDURE(S) PERFORMED:  1. EGD  2. ERCP with bilary sphincterotomy  3. ERCP with stone extraction  4. ERCP with fluoroscopy    PREOPERATIVE DIAGNOSIS:  Choledocholithiasis, concern for possible fistula    POSTOPERATIVE DIAGNOSIS(ES):  1. Normal EGD, no evidence of fistula opening with stomach or duodenum  2. Choledocholithiasis status post biliary sphincterotomy and stone extraction  3. No evidence of choledochoduodenal fistula  4. No contrast filling of the cystic duct or gallbladder      INDICATION:  Susan Dodson is a 56 y.o. male here for an ERCP for choledocholithiasis. Non-contrast imaging concerning for possible fistula between with gallbladder/bile duct to the antrum/duodenum therefore EGD performed as well. CONSENT:  Informed consent was obtained. The anesthesia and procedure along with the risks, benefits, and alternatives were discussed by myself and the nursing staff to the satisfaction of the patient/family with ample opportunity to ask and answer questions. We discussed the risks associated with this procedure including (but not limited to) bleeding, perforation, anesthesia reaction, post-procedural pancreatitis, cholangitis, or even death. PREMEDICATIONS AND OTHER MEDICATIONS:  Provided by anesthesia service. DETAILS OF PROCEDURE:  The patient was brought to the fluoroscopy unit and underwent endotracheal intubation under general anesthesia. The patient was placed in the left lateral prone position. The Olympus endoscope was inserted into the oropharynx and guided under direct vision into the esophagus, stomach, and duodenum. · The visualized portions of the esophagus were normal  · There was no evidence of Suh's esophagus in the distal esophagus.   · Careful examination of the stomach including views of the body and antrum were normal.  No evidence of inflammation or fistulous opening within the antrum. · Retroflexed view of the gastric cardia and fundus was normal.  · The pylorus was then intubated and the duodenal bulb was examined and normal.  No evidence of inflammation or fistulous opening within the duodenal bulb  · The second and third portions of the duodenum were normal.  · At this point, the insufflated air was withdrawn and the endoscope was removed from the patient. The Olympus therapeutic duodenoscope was inserted into the oropharynx and blindly advanced into the esophagus. The endoscope was then advanced under direct vision through the esophagus, stomach, and duodenum. The papilla was visualized in the second portion of the duodenum where the major papilla was visualized. Luminal:  · The visualized portions of the esophagus and stomach were normal.  · The papilla was visualized in the second portion of the duodenum, appeared to be in the normal location and slightly more prominent in size. Cholangiogram/Interventions:  · The biliary orifice was then selectively cannulated and the biliary tree was opacified using a tapered tip, pull - type sphincterotome to confirm location within the duct. · Initial cholangiogram revealed a round filling defect in the distal CBD. Diffuse biliary dilation (max 12 mm) and mild bilateral intrahepatic dilation. · A 12 mm biliary sphinctertomy was performed in the 12 o'clock direction using the tapered sphincterotome and blended cautery current over a guidewire. · A 9/12 mm multi-diameter balloon catheter was advanced through the biliary orifice, up the common duct to the confluence of the right and left hepatic ducts.  Contrast was injected to confirm no stones upstream. The balloon was inflated to 12 mm and swept down through the common hepatic duct, common bile duct and out through the sphincterotomy into the duodenum. · Removal of 2 round yellow stones on repeated 12 mm balloon sweeps. Additional balloon sweeps failed to extract any other stones or debris. · Occlusion cholangiogram after stone extraction was without residual filling defects. · Occlusion cholangiogram with over-inflation was performed. No evidence of contrast extravasation from the bile duct into the duodenum/stomach. The cystic duct was NOT visualized therefore unable to assess for fistula involving the gallbladder. · At this point, the cannulas were withdrawn. The duodenum and stomach were decompressed and the scope was withdrawn from the patient. · X-ray images and real-time fluoroscopy were carefully interpreted on the spot by the endoscopist during the procedure in the absence of a radiologist.  These interpretations helped guide the decision-making and interventions throughout the course of the procedure. Pancreatogram:  · Intentionally not performed      The patient tolerated the procedure well. The patient was transferred to PACU in good condition. There were no apparent complications. Blood loss - minimal.      RECOMMENDATIONS:    1. Resume pre-procedure medications and orders. Okay to start clear liquid diet and slowly advance as tolerated  2. Please notify GI service with any acute changes in the patient's clinical status (fever, hypotension, severe pain, bleeding, etc). 3. Choledocholithiasis status post biliary sphincterotomy and stone extraction. Monitor for improvement of labs/symptoms after above interventions  4. No evidence of choledochoduodenal fistula, however unable to rule out cholecystoduodenal fistula since contrast did not fill the cystic duct and gallbladder  5. Defer further management to General surgery      Please do not hesitate to call with questions/concerns or if  I can be of further assistance in the care of this patient.         Khris Clarke MD  [facility] [address], [address] [place], [address]  [phone] (phone)  [phone] (fax)

## 2020-10-09 NOTE — PROGRESS NOTES
PACU Transfer Note    Vitals:    10/09/20 1816   BP: 113/72   Pulse: 66   Resp: 26   Temp: 97.9 °F (36.6 °C)   SpO2: 99%       In: 800 [I.V.:800]  Out: 400 [Urine:400]    Pain assessment:  none  Pain Level: 0    Patient transported to [facility] via pacu transport.    10/9/2020 6:17 PM

## 2020-10-09 NOTE — CONSULTS
General Surgery   Resident Consult Note    Reason for Consult: abdominal pain, nausea, vomiting    History of Present Illness:   Celine Gomez is a 56 y.o. male with history of BPH, DM, HTN, and HLD who presents with abdominal pain. Patient states that he has had intermittent RUQ/epigastric abdominal pain under the ribs since last Tuesday. He states that the onset of pain would be about an hour after eating, associated with nausea and vomiting. He states that after several episodes of pain and nausea, the pain has become constant. He associates the painful episodes with \"explosive\" diarrhea. Bowel movements relieve some of the pressure and tightness he feels, but does not alleviate any pain. He has no other sick contacts. Patient had colonoscopy back in 2011 but does not remember the findings at the time. Patient has past surgical history of laparoscopic appendectomy in 2015 by Dr. Lindsay Rhodes. Past Medical History:        Diagnosis Date    Arthritis     BPH (benign prostatic hyperplasia)     BPH (benign prostatic hypertrophy) 9/2/2015    Clostridium difficile infection 10/24/2016    Diabetes mellitus ([facility] [address] 2012    Elevated LDL cholesterol level 1/27/2015    Essential hypertension, benign 1/27/2015    S/P colonoscopy 2011    Nml per pt'.  Wears glasses        Past Surgical History:        Procedure Laterality Date    COLONOSCOPY      HAND SURGERY Left 11/10/2017    OPERATIVE FIXATION OF LEFT RING FINGER PROXIMAL PHALANX AND LEFT SMALL FINGER MIDDLE PHALANX FRACTURES. INCLUDING OPEN REDUCTION INTERNAL FIXATION    LAPAROSCOPIC APPENDECTOMY      TURP N/A 12/6/2019    CYSTOSCOPY TRANSURETHRAL RESECTION PROSTATE performed by Silvana Klein DO at [address] [place] [address] Place:  Shellfish-derived products and Ibuprofen    Medications:   Home Meds  No current facility-administered medications on file prior to encounter.       Current Outpatient Medications on File Prior to Encounter   Medication Sig Dispense Refill    metFORMIN (GLUCOPHAGE) 1000 MG tablet TAKE 1 TABLET BY MOUTH TWICE DAILY WITH MEALS FOR DIABETES 60 tablet 2    Multiple Vitamins-Minerals (THERAPEUTIC MULTIVITAMIN-MINERALS) tablet Take 1 tablet by mouth daily         Current Meds  No current facility-administered medications for this encounter. Family History:   Family History   Problem Relation Age of Onset    Diabetes Mother     High Blood Pressure Mother     Diabetes Father     Cancer Neg Hx     Asthma Neg Hx     Heart Failure Neg Hx     High Cholesterol Neg Hx     Hypertension Neg Hx     Migraines Neg Hx     Rashes/Skin Problems Neg Hx     Seizures Neg Hx     Stroke Neg Hx     Thyroid Disease Neg Hx        Social History:   TOBACCO:   reports that he quit smoking about 13 months ago. His smoking use included cigarettes. He has a 2.50 pack-year smoking history. He has never used smokeless tobacco.  ETOH:   reports current alcohol use. DRUGS:   reports no history of drug use. Review of Systems:   14 point review of systems completed with pertinent findings in the HPI.      Physical exam:    Vitals:    10/08/20 1934 10/08/20 2200 10/09/20 0013   BP: 129/70 130/68 132/85   Pulse: 87 82 91   Resp: 16 16 17   Temp: 98.5 °F (36.9 °C)  98.3 °F (36.8 °C)   TempSrc: Oral  Oral   SpO2: 100% 100% 96%   Weight: 178 lb (80.7 kg)     Height: 6' (1.829 m)         General appearance: alert, no acute distress, grooming appropriate  Eyes: PERRLA, no scleral icterus  Neck: trachea midline, no JVD, no lymphadenopathy  Chest/Lungs: CTAB, normal effort  Cardiovascular: RRR, no murmurs/gallops/rubs  Abdomen: soft, minimally tender in the RUQ/epigastric region, non-distended, no guarding/rigidity  Skin: warm and dry, no rashes  Extremities: no edema, no cyanosis  Neuro: A&Ox3, no focal deficits, sensation intact    Labs:    CBC:   Recent Labs     10/08/20  1953   WBC 10.3   HGB 14.2   HCT 42.7   MCV 90.6        BMP:   Recent Labs 10/08/20  1953      K 4.5   CL 99   CO2 28   BUN 22*   CREATININE 1.2     PT/INR: No results for input(s): PROTIME, INR in the last 72 hours. APTT: No results for input(s): APTT in the last 72 hours. Liver Profile:   Lab Results   Component Value Date     10/08/2020     10/08/2020    BILITOT 3.2 10/08/2020    ALKPHOS 604 10/08/2020     Lab Results   Component Value Date    CHOL 130 09/05/2019    HDL 33 09/05/2019    TRIG 147 09/05/2019     UA:   Lab Results   Component Value Date    NITRITE neg 10/21/2019    COLORU DARK YELLOW 10/08/2020    PHUR 5.5 10/08/2020    WBCUA 21-50 10/08/2020    RBCUA 3-4 10/08/2020    YEAST Present 10/21/2019    BACTERIA Rare 10/08/2020    CLARITYU Clear 10/08/2020    SPECGRAV 1.025 10/08/2020    LEUKOCYTESUR Negative 10/08/2020    UROBILINOGEN 4.0 10/08/2020    BILIRUBINUR SMALL 10/08/2020    BILIRUBINUR neg 10/21/2019    BLOODU TRACE-INTACT 10/08/2020    GLUCOSEU >=1000 10/08/2020       Imaging:   CT ABDOMEN PELVIS WO CONTRAST Additional Contrast? None   Final Result      No acute abdominopelvic abnormality. Contracted gallbladder with questionable wall thickening and small nonspecific densities along the fundus. There also may be gallstones. As described on prior report, follow-up MRI/MRCP should be considered. No definite CT evidence of acute    cholecystitis. Prostatomegaly with circumferential bladder wall thickening, which may be secondary to bladder outlet obstruction versus cystitis. Assessment/Plan: This is a 56 y.o. male with with history of BPH, DM, HTN, and HLD who presents intermittent RUQ/epigastric abdominal pain about an hour after eating associated with nausea and vomiting. Now constant in nature. WBC 10.3. Bilirubin 3.2. CT with possible gallbladder wall thickening and gallstones. - Patient admitted to medicine team.  - Patient npo for ERCP vs MRCP.   - GI consulted. Will follow-up recommendations.   - Will continue to follow.      Aleksandr Tarango MD  10/09/20  1:11 AM

## 2020-10-09 NOTE — PROGRESS NOTES
4 Eyes Admission Assessment     I agree as the admission nurse that 2 RN's have performed a thorough Head to Toe Skin Assessment on the patient. ALL assessment sites listed below have been assessed on admission. Areas assessed by both nurses:   [x]   Head, Face, and Ears   [x]   Shoulders, Back, and Chest  [x]   Arms, Elbows, and Hands   [x]   Coccyx, Sacrum, and Ischium  [x]   Legs, Feet, and Heels        Does the Patient have Skin Breakdown?   No         Tex Prevention initiated:  NA   Wound Care Orders initiated:  NA      WOC nurse consulted for Pressure Injury (Stage 3,4, Unstageable, DTI, NWPT, and Complex wounds) or Tex score 18 or lower:  No      Nurse 1 eSignature: Electronically signed by Army John RN on 10/9/20 at 12:42 AM EDT    **SHARE this note so that the co-signing nurse is able to place an eSignature**    Nurse 2 eSignature: Electronically signed by Giancarlo Alegre RN on 10/9/20 at 8:02 AM EDT

## 2020-10-09 NOTE — CARE COORDINATION
Case Management Assessment           Initial Evaluation                Date / Time of Evaluation: 10/9/2020 12:37 PM                 Assessment Completed by: Javier Rowan    Patient Name: Marylen Ko     YOB: 1960  Diagnosis: Choledocholithiasis [K80.50]     Date / Time: 10/8/2020  7:30 PM    Patient Admission Status: Inpatient    If patient is discharged prior to next notation, then this note serves as note for discharge by case management. Current PCP: John Gann MD  Clinic Patient: No    Chart Reviewed: Yes  Patient/ Family Interviewed: Yes    Initial assessment completed at bedside with: pt    Hospitalization in the last 30 days: No    Emergency Contacts:  Extended Emergency Contact Information  Primary Emergency Contact: [address] Phone: [phone]  Relation: Parent  Secondary Emergency Contact: [address] Phone: [phone]  Relation: Spouse    Advance Directives:   Code Status: Full Code    Healthcare Power of : No    Financial  Payor: [employer] / Plan: [employer] / Product Type: *No Product type* /     Pre-cert required for SNF: Yes    [address] [facility] [address]  [address] [facility] [address]  Phone: [phone] Fax: [phone]    Robert Marcos #[id number] - [facility], [address] [place] [address] [phone] [facility] [phone]  [place] [address]  Phone: [phone] Fax: [phone]      Potential assistance Purchasing Medications: Potential Assistance Purchasing Medications: No  Does Patient want to participate in local refill/ meds to beds program?: No    Meds To Beds General Rules:  1. Can ONLY be done Monday- Friday between 8:30am-5pm  2. Prescription(s) must be in pharmacy by 3pm to be filled same day  3. Copy of patient's insurance/ prescription drug card and Speech IRP Initial Evaluation-Bedside Swallow  and Treatment                             .                       Primary Rehabilitation Diagnosis: TBI  Expected Discharge Date: 02/01/17  Planned Discharge Destination: Home    OBJECTIVE:  See below for current functional status overview.  See Speech flowsheets for full details regarding the speech therapy provided.    ASSESSMENT:  Patient was seen on IRP for clinical swallow evaluation and communication and cognition treatment.    Com/cog: Pt required min verbal cues for completion of checkbook balancing task. Cues required for attention to detail, especially when alternating attention. Pt completed basic time calculations with 50% accuracy, increasing to 100% with verbal cues.     Swallow: Oral mechanism within normal limits. Pt complains of occasional globus sensation with dry/hard consistencies. Pt tolerated hard granola bar with <10% ss pharyngeal dysphagia (throat clear 1, suspect d/t pharyngeal residue that is resolved with liquid wash). Pt tolerated thin liquids without overt ss aspiration. Pt appears appropriate for current diet. Will follow up x1 during meal to ensure tolerance.    Continued skilled service reasonable and necessary for com/cog and swallow therapy.     PLAN:   Plan for Next Session: ANDRE brown on IRP. see plan of care note. focus on memory, attention, problem solving, cmprehensionComm-cog: pt scored an 18/30 on the MoCA. Completely independent at baseline, driving manages meds/finance. Manages most of the cooking/cleaning per pt since his wife has early dementia. Pt reports family can likely help at d/c if needed. sw: meal x1 and d/c if toelrating    EDUCATION:   On this date, the patient educated on above.    The response to education: Verbalizes understanding    RECOMMENDATIONS FOR DISCHARGE:  SLP Identified Barriers to Discharge: comm/cog (01/20/17 1200)  Recommendations for Discharge: SLP: suspect post IRP therapy for comm/cog  (01/20/17 1200)    Clinical Swallow Evaluation Data Overview Last 24 hours     Observation  Temperature Spikes Noted: No (01/20/17 1200)  Oxygen Needs: Room air (01/20/17 1200)  Vision: Functional for self-feeding (01/20/17 1200)  Patient Positioning: Upright in bed (01/20/17 1200)  Pre-trial Vocal Quality: Normal (01/20/17 1200)  Volitional Swallow: Present (01/20/17 1200)  Observation Comments: c-collar in place (01/20/17 1200)              Thin  Presentation: Straw (01/20/17 1200)  Quantity: 4oz (01/20/17 1200)  Oral Phase: Premature spillage suspected (01/20/17 1200)  Pharyngeal Phase: Delayed swallow (01/20/17 1200)  Comments: no SS of aspiration or penetration (01/20/17 1200)                                       General Consistency  Quantity: hard granola bar (01/20/17 1200)  Oral Phase: Intact (01/20/17 1200)  Pharyngeal Phase: Delayed swallow (01/20/17 1200)  Comments: no overt ss aspiration, delayed throat clear x1 likely d/t pharygeal residue, cleared with liqudi wash (01/20/17 1200)              Esophageal Phase  Esophageal Phase: No esophageal symptoms noted (01/20/17 1200)    Aspiration Risk  Risk for Aspiration: Minimal (01/20/17 1200)      All Speech Therapy Goals:    SLP Goals  Short Term Goals to be Reviewed on : 01/26/17 (01/19/17 1518)  STG are the Same as Discharge Goals: No (01/19/17 1518)  Goal Agreement: Patient agrees with goals and treatment plan (01/19/17 1518)    Attention Short Term Goal 1  Attention STG 1: pt will perform alternating/divided attention tasks at 80% and min to mod cues (01/19/17 1518)  Attention Discharge Goal 1: com/cog skills with min assist (01/19/17 1518)    Memory Short Term Goal 1  Memory STG 1: pt will recall recent events/tasks at 75% and mod cues (01/19/17 1518)  Memory Discharge Goal 1: comm/cog skills with min asisst (01/19/17 1518)    Memory STG 1: pt will recall recent events/tasks at 75% and mod cues (01/19/17 1518)    Problem Solving Short Term Goal  patient face sheet must be sent along with the prescription(s)  4. Cost of Rx cannot be added to hospital bill. If financial assistance is needed, please contact unit  or ;  or  CANNOT provide pharmacy voucher for patients co-pays  5. Patients can then  the prescription on their way out of the hospital at discharge, or pharmacy can deliver to the bedside if staff is available. (payment due at time of pick-up or delivery - cash, check, or card accepted)     Able to afford home medications/ co-pay costs: Yes    ADLS  Support Systems: Family Members    PT AM-PAC:   /24  OT AM-PAC:   /24    [place]: from home ind with family  Steps:     Plans to RETURN to current housing: Yes  Barriers to RETURNING to current housing: surgery recovery    Brie Vickeybernadine 76  Currently ACTIVE with [address]: No  Home Care Agency: Not Applicable    Currently ACTIVE with [facility]: No      Durable Medical Equipment  DME Provider: none  Equipment:     Home Oxygen and [address] prior to admission: No  [address]: Not Applicable  Other Respiratory Equipment:       Dialysis  Active with HD/PD prior to admission: No  Nephrologist:     HD Center:  Not Applicable    DISCHARGE PLAN:  Disposition: Home- No Services Needed    Transportation PLAN for discharge: family     Factors facilitating achievement of predicted outcomes: Family support, Cooperative and Pleasant    Barriers to discharge: Medical complications    Additional Case Management Notes: Pt from home independent with family.   Plan EGD today and Lap lynda on Sat, plan to DC home no needs anticipated    The Plan for Transition of Care is related to the following treatment goals of Choledocholithiasis [K80.50]    The Patient and/or patient representative Jeffery Slade and his family were provided with a choice of provider and agrees with the discharge plan Yes    Fre[place] of 1  Problem Solving STG 1: pt will perform functional problem solving/sequencing takss at 85% and min-mod cues (01/19/17 1518)  Problem Solving Discharge Goal 1: comm/cog skills with min assist (01/19/17 1518)                        Verbal/Gesture Short Term Goal 1  Verbal/Gesture STG 1: Pt will participate in simple to mod level word finding tasks and the conversational speech level with min verbal cues and use of compensatory strategies for increased functional communication. (01/19/17 1518)  Verbal/Gesture Discharge Goal 1: Functional communication with min assist. (01/19/17 1518)                        Swallowing Short Term Goal 1  Swallowing STG 1: pt will tolerate general/thin diet with <10% ss aspiration and stable temps/luns (01/19/17 1518)  Swallowing Discharge Goal 1: pt will toelrate least restritcive diet (01/19/17 1518)                                                           Communication/Cognition Data Overview Last 24 hours       Subjective  Subjective Comments: pt up in chair cooperative with session (01/20/17 1100)    Observation  Observation Comments: RN approved session (01/20/17 1100)    Behavior/Social Interaction  Arousal and Alertness: Appropriate responses to stimuli (01/20/17 1100)  Cooperates with Tasks: Intact (01/20/17 1100)    Verbal Expression  Verbal Expression Comments: word finding difficulty evident in conversational speech (01/20/17 1100)                             Attention  Alternating Attention: Mild impairment (01/20/17 1100)  Divided Attention: Mild impairment (01/20/17 1100)  Selective Attention: Mild impairment (01/20/17 1100)  Sustained Attention: Mild impairment (01/20/17 1100)                   Numerical Reasoning  Simple Calculations: Intact (01/20/17 1100)  Time - Calculations: Moderate impairment (01/20/17 1100)      All Speech Therapy Goals:    SLP Goals  Short Term Goals to be Reviewed on : 01/26/17 (01/19/17 1518)  STG are the Same as Discharge Goals: No (01/19/17  choice list was provided with basic dialogue that supports the patient's individualized plan of care/goals and shares the quality data associated with the providers.  Not Indicated    Care Transition patient: Cheryl Oliveira RN  [facility]  Case Management Department  Ph: [phone]   Fax: [phone] 1518)  Goal Agreement: Patient agrees with goals and treatment plan (01/19/17 1518)    Attention Short Term Goal 1  Attention STG 1: pt will perform alternating/divided attention tasks at 80% and min to mod cues (01/19/17 1518)  Attention Discharge Goal 1: com/cog skills with min assist (01/19/17 1518)    Memory Short Term Goal 1  Memory STG 1: pt will recall recent events/tasks at 75% and mod cues (01/19/17 1518)  Memory Discharge Goal 1: comm/cog skills with min asisst (01/19/17 1518)    Memory STG 1: pt will recall recent events/tasks at 75% and mod cues (01/19/17 1518)    Problem Solving Short Term Goal 1  Problem Solving STG 1: pt will perform functional problem solving/sequencing takss at 85% and min-mod cues (01/19/17 1518)  Problem Solving Discharge Goal 1: comm/cog skills with min assist (01/19/17 1518)                        Verbal/Gesture Short Term Goal 1  Verbal/Gesture STG 1: Pt will participate in simple to mod level word finding tasks and the conversational speech level with min verbal cues and use of compensatory strategies for increased functional communication. (01/19/17 1518)  Verbal/Gesture Discharge Goal 1: Functional communication with min assist. (01/19/17 1518)                        Swallowing Short Term Goal 1  Swallowing STG 1: pt will tolerate general/thin diet with <10% ss aspiration and stable temps/luns (01/19/17 1518)  Swallowing Discharge Goal 1: pt will toelrate least restritcive diet (01/19/17 1518)                                                      SLP Time Spent: 30 min (01/20/17 1200)

## 2020-10-09 NOTE — ED PROVIDER NOTES
[facility] Emergency Department      Pt Name: Lexie Link  MRN: [id number]  [place] 1960  Date of evaluation: 10/8/2020  Provider: Durga Ferro MD  CHIEF COMPLAINT  Chief Complaint   Patient presents with    Abdominal Pain     c/o Mid abdominal pain x3 days. Has been having to \"induce vomiting\" to feel better. HPI  Lexie Link is a 56 y.o. male who presents because of abdominal pain. He has been getting epigastric pain for the past 3 days every time he tries to eat. He has needed to induce vomiting after eating every day, including today. He says if he does not make himself vomit, he feels like his stomach is going to explode. He has never had symptoms like this before. He denies any fever. His last bowel movement was couple days ago. He denies any dysuria or frequency. His pain at present has markedly improved since he induced vomiting while at work in order to finish up his duties. Past surgical history includes laparoscopic appendectomy and TURP. He has been able to tolerate liquids without difficulty. REVIEW OF SYSTEMS:  No fever, no breathing difficulty, no dysuria, no chest pain Pertinent positives and negatives as per the HPI. All other review of systems reviewed and negative. Nursing notes reviewed. PAST MEDICAL HISTORY:  Past Medical History:   Diagnosis Date    Arthritis     BPH (benign prostatic hyperplasia)     BPH (benign prostatic hypertrophy) 9/2/2015    Clostridium difficile infection 10/24/2016    Diabetes mellitus ([facility] [address] 2012    Elevated LDL cholesterol level 1/27/2015    Essential hypertension, benign 1/27/2015    S/P colonoscopy 2011    Nml per pt'.  Wears glasses      SURGICAL HISTORY  Past Surgical History:   Procedure Laterality Date    COLONOSCOPY      HAND SURGERY Left 11/10/2017    OPERATIVE FIXATION OF LEFT RING FINGER PROXIMAL PHALANX AND LEFT SMALL FINGER MIDDLE PHALANX FRACTURES.  INCLUDING OPEN REDUCTION INTERNAL FIXATION    LAPAROSCOPIC APPENDECTOMY      TURP N/A 2019    CYSTOSCOPY TRANSURETHRAL RESECTION PROSTATE performed by Bryce Carlos DO at [address] [place] [address]:  No current facility-administered medications on file prior to encounter. Current Outpatient Medications on File Prior to Encounter   Medication Sig Dispense Refill    metFORMIN (GLUCOPHAGE) 1000 MG tablet TAKE 1 TABLET BY MOUTH TWICE DAILY WITH MEALS FOR DIABETES 60 tablet 2    Multiple Vitamins-Minerals (THERAPEUTIC MULTIVITAMIN-MINERALS) tablet Take 1 tablet by mouth daily       ALLERGIES  Shellfish-derived products and Ibuprofen  FAMILY HISTORY:  Family History   Problem Relation Age of Onset    Diabetes Mother     High Blood Pressure Mother     Diabetes Father     Cancer Neg Hx     Asthma Neg Hx     Heart Failure Neg Hx     High Cholesterol Neg Hx     Hypertension Neg Hx     Migraines Neg Hx     Rashes/Skin Problems Neg Hx     Seizures Neg Hx     Stroke Neg Hx     Thyroid Disease Neg Hx      SOCIAL HISTORY:  Social History     Tobacco Use    Smoking status: Former Smoker     Packs/day: 0.25     Years: 10.00     Pack years: 2.50     Types: Cigarettes     Last attempt to quit: 2019     Years since quittin.1    Smokeless tobacco: Never Used   Substance Use Topics    Alcohol use:  Yes     Alcohol/week: 0.0 standard drinks     Comment: occasionally    Drug use: No     Types: Marijuana     Comment: 5-6 years ago      IMMUNIZATIONS:  Noncontributory    PHYSICAL EXAM  VITAL SIGNS:  /70   Pulse 87   Temp 98.5 °F (36.9 °C) (Oral)   Resp 16   Ht 6' (1.829 m)   Wt 178 lb (80.7 kg)   SpO2 100%   BMI 24.14 kg/m²   Constitutional:  56 y.o. male cooperative, nontoxic  HENT:  Atraumatic, mucous membranes moist  Eyes:   Conjunctiva clear, no icterus  Neck:  Supple, no adenopathy  Cardiovascular:  Regular, no rubs  Thorax & Lungs:  No accessory muscle usage, clear  Abdomen:  Soft, non distended, bowel sounds present, mild Clear    Glucose, Ur >=1000 (A) Negative mg/dL    Bilirubin Urine SMALL (A) Negative    Ketones, Urine Negative Negative mg/dL    Specific Gravity, UA 1.025 1.005 - 1.030    Blood, Urine TRACE-INTACT (A) Negative    pH, UA 5.5 5.0 - 8.0    Protein, UA TRACE (A) Negative mg/dL    Urobilinogen, Urine 4.0 (A) <2.0 E.U./dL    Nitrite, Urine Negative Negative    Leukocyte Esterase, Urine Negative Negative    Microscopic Examination YES     Urine Type NotGiven     Urine Reflex to Culture Yes    Troponin   Result Value Ref Range    Troponin <0.01 <0.01 ng/mL   Microscopic Urinalysis   Result Value Ref Range    Hyaline Casts, UA 0-2 0 - 2 /LPF    WBC, UA 21-50 (A) 0 - 5 /HPF    RBC, UA 3-4 0 - 4 /HPF    Epithelial Cells, UA 0-1 0 - 5 /HPF    Bacteria, UA Rare (A) None Seen /HPF     RADIOLOGY:    CT ABDOMEN PELVIS WO CONTRAST Additional Contrast? None   Final Result      No acute abdominopelvic abnormality. Contracted gallbladder with questionable wall thickening and small nonspecific densities along the fundus. There also may be gallstones. As described on prior report, follow-up MRI/MRCP should be considered. No definite CT evidence of acute    cholecystitis. Prostatomegaly with circumferential bladder wall thickening, which may be secondary to bladder outlet obstruction versus cystitis. ED COURSE:  Uneventful     PROCEDURES:  None    CRITICAL CARE:  None    CONSULTATIONS:  Dr Kusum Irwin, Dr Wakefield Pleasant: Maia Knight is a 56 y.o. male who presented because of abdominal pain. His diagnostic testing strongly suggests choledocholithiasis. He has abnormality of LFTs, abnormal findings on CT imaging, and has an intolerance to eating foods. I did consult with Dr. Kusum Irwin. He will consult along with gastroenterology. We did place a call to GI but I have not spoken with the provider as of the timing of this note. I did speak with Dr. Gwen Watkins who will admit patient for further care.   Differential Diagnosis: appendicitis, obstruction, perforated viscus, intestinal ischemia, AAA dissection, other    FINAL IMPRESSION:    1. Choledocholithiasis        (Please note that I used voice recognition software to generate this note.   Occasionally words are mistranscribed despite my efforts to edit errors.)        Divine Christina MD  10/08/20 [id number]

## 2020-10-09 NOTE — PROGRESS NOTES
Patient arrived to PACU post EGD and ERCP by Dr. Gaurav Villarreal. Per RN report 2 large stones were removed, no fistula noted. Per CRNA report patient tolerated procedure well. Patient arrived with oral airway present and removed before report was concluded. VS stable.

## 2020-10-09 NOTE — ANESTHESIA POSTPROCEDURE EVALUATION
Department of Anesthesiology  Postprocedure Note    Patient: Purvi Sterling  MRN: [id number]  YOB: 1960  Date of evaluation: 10/9/2020  Time:  6:24 PM     Procedure Summary     Date:  10/09/20 Room / Location:  [facility]    Anesthesia Start:  1619 Anesthesia Stop:  3672    Procedures:       EGD DIAGNOSTIC ONLY (N/A )      ERCP STONE REMOVAL      ERCP SPHINCTER/PAPILLOTOMY Diagnosis:  (CHOLEDOCOLITHIASIS)    Surgeon:  Sebas Augustin MD Responsible Provider:  Toma Leyden, DO    Anesthesia Type:  general ASA Status:  2          Anesthesia Type: general    Lonny Phase I: Lonny Score: 10    Lonny Phase II:      Last vitals: Reviewed and per EMR flowsheets.        Anesthesia Post Evaluation    Patient location during evaluation: PACU  Patient participation: complete - patient participated  Level of consciousness: awake  Pain score: 0  Airway patency: patent  Nausea & Vomiting: no nausea and no vomiting  Complications: no  Cardiovascular status: blood pressure returned to baseline  Respiratory status: acceptable  Hydration status: euvolemic

## 2020-10-10 LAB
ALBUMIN SERPL-MCNC: 3.2 G/DL (ref 3.4–5)
ALP BLD-CCNC: 447 U/L (ref 40–129)
ALT SERPL-CCNC: 218 U/L (ref 10–40)
ANION GAP SERPL CALCULATED.3IONS-SCNC: 10 MMOL/L (ref 3–16)
AST SERPL-CCNC: 90 U/L (ref 15–37)
BASOPHILS ABSOLUTE: 0.1 K/UL (ref 0–0.2)
BASOPHILS RELATIVE PERCENT: 1 %
BILIRUB SERPL-MCNC: 1.6 MG/DL (ref 0–1)
BILIRUBIN DIRECT: 0.6 MG/DL (ref 0–0.3)
BILIRUBIN, INDIRECT: 1 MG/DL (ref 0–1)
BUN BLDV-MCNC: 12 MG/DL (ref 7–20)
CALCIUM SERPL-MCNC: 8.5 MG/DL (ref 8.3–10.6)
CHLORIDE BLD-SCNC: 101 MMOL/L (ref 99–110)
CO2: 25 MMOL/L (ref 21–32)
CREAT SERPL-MCNC: 1 MG/DL (ref 0.8–1.3)
EOSINOPHILS ABSOLUTE: 0.3 K/UL (ref 0–0.6)
EOSINOPHILS RELATIVE PERCENT: 5.2 %
GFR AFRICAN AMERICAN: >60
GFR NON-AFRICAN AMERICAN: >60
GLUCOSE BLD-MCNC: 119 MG/DL (ref 70–99)
GLUCOSE BLD-MCNC: 198 MG/DL (ref 70–99)
GLUCOSE BLD-MCNC: 245 MG/DL (ref 70–99)
GLUCOSE BLD-MCNC: 256 MG/DL (ref 70–99)
GLUCOSE BLD-MCNC: 259 MG/DL (ref 70–99)
GLUCOSE BLD-MCNC: 262 MG/DL (ref 70–99)
HCT VFR BLD CALC: 36.2 % (ref 40.5–52.5)
HEMOGLOBIN: 12.4 G/DL (ref 13.5–17.5)
LYMPHOCYTES ABSOLUTE: 1.2 K/UL (ref 1–5.1)
LYMPHOCYTES RELATIVE PERCENT: 20.3 %
MAGNESIUM: 1.8 MG/DL (ref 1.8–2.4)
MCH RBC QN AUTO: 30.8 PG (ref 26–34)
MCHC RBC AUTO-ENTMCNC: 34.2 G/DL (ref 31–36)
MCV RBC AUTO: 89.9 FL (ref 80–100)
MONOCYTES ABSOLUTE: 0.5 K/UL (ref 0–1.3)
MONOCYTES RELATIVE PERCENT: 8.5 %
NEUTROPHILS ABSOLUTE: 3.8 K/UL (ref 1.7–7.7)
NEUTROPHILS RELATIVE PERCENT: 65 %
PDW BLD-RTO: 12.7 % (ref 12.4–15.4)
PERFORMED ON: ABNORMAL
PHOSPHORUS: 3.5 MG/DL (ref 2.5–4.9)
PLATELET # BLD: 214 K/UL (ref 135–450)
PMV BLD AUTO: 8.5 FL (ref 5–10.5)
POTASSIUM SERPL-SCNC: 3.7 MMOL/L (ref 3.5–5.1)
RBC # BLD: 4.03 M/UL (ref 4.2–5.9)
SODIUM BLD-SCNC: 136 MMOL/L (ref 136–145)
TOTAL PROTEIN: 5.3 G/DL (ref 6.4–8.2)
URINE CULTURE, ROUTINE: NORMAL
WBC # BLD: 5.8 K/UL (ref 4–11)

## 2020-10-10 PROCEDURE — 99231 SBSQ HOSP IP/OBS SF/LOW 25: CPT | Performed by: SURGERY

## 2020-10-10 PROCEDURE — 80076 HEPATIC FUNCTION PANEL: CPT

## 2020-10-10 PROCEDURE — 2580000003 HC RX 258: Performed by: INTERNAL MEDICINE

## 2020-10-10 PROCEDURE — 36415 COLL VENOUS BLD VENIPUNCTURE: CPT

## 2020-10-10 PROCEDURE — 1200000000 HC SEMI PRIVATE

## 2020-10-10 PROCEDURE — 6370000000 HC RX 637 (ALT 250 FOR IP): Performed by: STUDENT IN AN ORGANIZED HEALTH CARE EDUCATION/TRAINING PROGRAM

## 2020-10-10 PROCEDURE — 85025 COMPLETE CBC W/AUTO DIFF WBC: CPT

## 2020-10-10 PROCEDURE — 2580000003 HC RX 258: Performed by: STUDENT IN AN ORGANIZED HEALTH CARE EDUCATION/TRAINING PROGRAM

## 2020-10-10 PROCEDURE — 6360000002 HC RX W HCPCS: Performed by: STUDENT IN AN ORGANIZED HEALTH CARE EDUCATION/TRAINING PROGRAM

## 2020-10-10 PROCEDURE — 6360000002 HC RX W HCPCS: Performed by: INTERNAL MEDICINE

## 2020-10-10 PROCEDURE — 99232 SBSQ HOSP IP/OBS MODERATE 35: CPT | Performed by: NURSE PRACTITIONER

## 2020-10-10 PROCEDURE — 83735 ASSAY OF MAGNESIUM: CPT

## 2020-10-10 PROCEDURE — 83036 HEMOGLOBIN GLYCOSYLATED A1C: CPT

## 2020-10-10 PROCEDURE — 6370000000 HC RX 637 (ALT 250 FOR IP): Performed by: INTERNAL MEDICINE

## 2020-10-10 PROCEDURE — 80069 RENAL FUNCTION PANEL: CPT

## 2020-10-10 RX ORDER — MAGNESIUM SULFATE IN WATER 40 MG/ML
2 INJECTION, SOLUTION INTRAVENOUS ONCE
Status: COMPLETED | OUTPATIENT
Start: 2020-10-10 | End: 2020-10-10

## 2020-10-10 RX ORDER — POTASSIUM CHLORIDE 7.45 MG/ML
10 INJECTION INTRAVENOUS
Status: COMPLETED | OUTPATIENT
Start: 2020-10-10 | End: 2020-10-10

## 2020-10-10 RX ADMIN — POTASSIUM CHLORIDE 10 MEQ: 10 INJECTION, SOLUTION INTRAVENOUS at 09:16

## 2020-10-10 RX ADMIN — SODIUM CHLORIDE, POTASSIUM CHLORIDE, SODIUM LACTATE AND CALCIUM CHLORIDE: 600; 310; 30; 20 INJECTION, SOLUTION INTRAVENOUS at 04:30

## 2020-10-10 RX ADMIN — INSULIN LISPRO 3 UNITS: 100 INJECTION, SOLUTION INTRAVENOUS; SUBCUTANEOUS at 00:11

## 2020-10-10 RX ADMIN — Medication 10 ML: at 23:12

## 2020-10-10 RX ADMIN — POTASSIUM CHLORIDE 10 MEQ: 10 INJECTION, SOLUTION INTRAVENOUS at 12:38

## 2020-10-10 RX ADMIN — PIPERACILLIN AND TAZOBACTAM 3.38 G: 3; .375 INJECTION, POWDER, LYOPHILIZED, FOR SOLUTION INTRAVENOUS at 23:11

## 2020-10-10 RX ADMIN — MAGNESIUM SULFATE HEPTAHYDRATE 2 G: 40 INJECTION, SOLUTION INTRAVENOUS at 09:16

## 2020-10-10 RX ADMIN — INSULIN HUMAN 5 UNITS: 100 INJECTION, SUSPENSION SUBCUTANEOUS at 13:21

## 2020-10-10 RX ADMIN — INSULIN LISPRO 2 UNITS: 100 INJECTION, SOLUTION INTRAVENOUS; SUBCUTANEOUS at 09:15

## 2020-10-10 RX ADMIN — PIPERACILLIN AND TAZOBACTAM 3.38 G: 3; .375 INJECTION, POWDER, LYOPHILIZED, FOR SOLUTION INTRAVENOUS at 15:42

## 2020-10-10 RX ADMIN — PIPERACILLIN AND TAZOBACTAM 3.38 G: 3; .375 INJECTION, POWDER, LYOPHILIZED, FOR SOLUTION INTRAVENOUS at 04:30

## 2020-10-10 RX ADMIN — ENOXAPARIN SODIUM 40 MG: 40 INJECTION SUBCUTANEOUS at 09:16

## 2020-10-10 RX ADMIN — INSULIN LISPRO 6 UNITS: 100 INJECTION, SOLUTION INTRAVENOUS; SUBCUTANEOUS at 13:21

## 2020-10-10 RX ADMIN — INSULIN GLARGINE 5 UNITS: 100 INJECTION, SOLUTION SUBCUTANEOUS at 22:01

## 2020-10-10 RX ADMIN — POTASSIUM CHLORIDE 10 MEQ: 10 INJECTION, SOLUTION INTRAVENOUS at 10:44

## 2020-10-10 RX ADMIN — INSULIN LISPRO 3 UNITS: 100 INJECTION, SOLUTION INTRAVENOUS; SUBCUTANEOUS at 22:01

## 2020-10-10 RX ADMIN — Medication 10 ML: at 10:45

## 2020-10-10 ASSESSMENT — PAIN SCALES - GENERAL
PAINLEVEL_OUTOF10: 0

## 2020-10-10 ASSESSMENT — ENCOUNTER SYMPTOMS
VOMITING: 0
SHORTNESS OF BREATH: 0
DIARRHEA: 0
BACK PAIN: 0
EYE PAIN: 0
ABDOMINAL PAIN: 1
CONSTIPATION: 0
SINUS PAIN: 0

## 2020-10-10 ASSESSMENT — VISUAL ACUITY: OU: 1

## 2020-10-10 NOTE — PROGRESS NOTES
Surgery Daily Progress Note  Cathlene Schaumann    CC: epigastric abdominal pain     Subjective :  Patient resting well overnight. States that abdominal pain is improved. Denies nausea or vomiting. Tolerating clear liquid diet. States that he has an appetite and would like food. ERCP completed 10/9 with sphincterotomy and stone extraction       Objective    Infusions:   dextrose      lactated ringers 125 mL/hr at 10/10/20 0430        I/O:I/O last 3 completed shifts: In: 1260 [P.O.:360; I.V.:800; IV Piggyback:100]  Out: 1300 [Urine:1300]           Wt Readings from Last 1 Encounters:   10/08/20 178 lb (80.7 kg)                 LABS:   Recent Labs     10/08/20  1953   WBC 10.3   HGB 14.2   HCT 42.7   MCV 90.6           Recent Labs     10/08/20  1953 10/09/20  0529    136   K 4.5 3.9   CL 99 101   CO2 28 25   BUN 22* 17   CREATININE 1.2 1.0        Recent Labs     10/08/20  1953 10/09/20  0529   * 215*   * 312*   BILITOT 3.2* 2.6*   ALKPHOS 604* 495*        Recent Labs     10/08/20  1953   LIPASE 55.0        Recent Labs     10/08/20  1953 10/09/20  0529   PROT 7.4 6.1*        Recent Labs     10/08/20  1953   TROPONINI <0.01            Exam:  /74   Pulse 56   Temp 97.8 °F (36.6 °C) (Oral)   Resp 16   Ht 6' (1.829 m)   Wt 178 lb (80.7 kg)   SpO2 96%   BMI 24.14 kg/m²     CONSTITUTIONAL:  awake, alert & oriented x 3, cooperative, no apparent distress, and appears stated age  HEENT: No palpable lymphadenopathy, no icterus  LUNGS: No increased work of breathing, clear to auscultation bilaterally, no crackles or wheezing  CV:  Regular rate and rhythm, normal S1 and S2, and no murmur noted  ABDOMEN: Soft,  Non tender , non-distended,     ASSESSMENT/PLAN:   Pt. is a 56 y.o. male with choledocholithiasis s/p ERCP 10/9 with sphincterotomy and stone extraction     · Follow up AM hepatic panel.    · Continue clear liquid diet - possibly advance to low fat diet later today  · Patient needs improved glucose control- increased to medium dose sliding scale- request assistance from medical service   · Plan for Robotic laparoscopic assisted cholecystectomy with intraoperative cholangiogram on Monday 10/12 with Dr. Reginaldo Harris   · Continue Zosyn until OR   · General surgery team to follow closely       Kristine Coy MD  4:34 AM  10/10/2020   [phone]

## 2020-10-10 NOTE — PROGRESS NOTES
Wt Readings from Last 2 Encounters:   10/08/20 178 lb (80.7 kg)   04/08/20 198 lb (89.8 kg)     Constitutional: He is oriented to person, place, and time. He appears well-developed and well-nourished. In no acute distress. Cardiovascular: Normal rate, regular rhythm, normal heart sounds and intact distal pulses. Exam reveals no gallop and no friction rub. No murmur heard. Pulmonary/Chest: Effort normal and breath sounds normal. No respiratory distress. He has no wheezes, rhonchi or rales. Extremities: No edema, cyanosis, or clubbing. Pulses are 2+ radial/carotid/dorsalis pedis and posterior tibial bilaterally. Neurological: oriented to person place    Skin: Skin is warm and dry. There is no rash or diaphoresis. Psychiatric: He has a normal mood and affect.  His speech is normal and behavior is normal.     Medications:    insulin glargine  5 Units Subcutaneous Nightly    insulin NPH  5 Units Subcutaneous Once    sodium chloride flush  10 mL Intravenous 2 times per day    enoxaparin  40 mg Subcutaneous Daily    influenza virus vaccine  0.5 mL Intramuscular Once    piperacillin-tazobactam  3.375 g Intravenous Q8H    insulin lispro  0-12 Units Subcutaneous TID WC    insulin lispro  0-6 Units Subcutaneous Nightly      dextrose       sodium chloride flush, polyethylene glycol, promethazine **OR** ondansetron, glucose, dextrose, glucagon (rDNA), dextrose, dextrose    Lab Data:/ EKGs reviewed    CBC:   Recent Labs     10/08/20  1953 10/10/20  0448   WBC 10.3 5.8   HGB 14.2 12.4*    214     BMP:    Recent Labs     10/08/20  1953 10/09/20  0529 10/10/20  0448    136 136   K 4.5 3.9 3.7   CO2 28 25 25   BUN 22* 17 12   CREATININE 1.2 1.0 1.0     LIVR:   Recent Labs     10/08/20  1953 10/09/20  0529 10/10/20  0448   * 215* 90*   * 312* 218*       Reviewed  available lab work,  EKGs, images, [facility]       Assessment    Choledocholithiasis  Plan lap lynda Monday with  [facility]    EKG shows NSR, IRBBB,  septal MI Au. Reviewed previous EKGs this is similar to EKG 11/17. Plan:   Will follow     Corinne Grippe APRN, CVNP

## 2020-10-10 NOTE — PROGRESS NOTES
Progress Note    Admit Date: 10/8/2020  Day: 2  Diet: DIET LOW FAT; Carb Control: 3 carb choices (45 gms)/meal    CC: Abdominal pain, nausea, vomiting    Interval history: No overnight events. Patient had sphincterotomy and stone extraction yesterday. Hepatic labs normalizing this AM. Patient states that he has mild RUQ abdominal pain but feels better overall. Feels as if he could tolerate a PO diet. No other acute complaints at this time. Medications:     Scheduled Meds:   insulin glargine  5 Units Subcutaneous Nightly    sodium chloride flush  10 mL Intravenous 2 times per day    enoxaparin  40 mg Subcutaneous Daily    influenza virus vaccine  0.5 mL Intramuscular Once    piperacillin-tazobactam  3.375 g Intravenous Q8H    insulin lispro  0-12 Units Subcutaneous TID WC    insulin lispro  0-6 Units Subcutaneous Nightly     Continuous Infusions:   dextrose       PRN Meds:sodium chloride flush, polyethylene glycol, promethazine **OR** ondansetron, glucose, dextrose, glucagon (rDNA), dextrose, dextrose    Objective:   Vitals:   T-max:  Patient Vitals for the past 8 hrs:   BP Temp Temp src Pulse Resp SpO2   10/10/20 0913 115/72 97.5 °F (36.4 °C) Oral 57 15 96 %       Intake/Output Summary (Last 24 hours) at 10/10/2020 1326  Last data filed at 10/10/2020 1045  Gross per 24 hour   Intake 2042 ml   Output 1250 ml   Net 792 ml     Review of Systems   Constitutional: Negative for fatigue and fever. HENT: Negative for ear pain and sinus pain. Eyes: Negative for pain. Respiratory: Negative for shortness of breath. Cardiovascular: Negative for chest pain. Gastrointestinal: Positive for abdominal pain. Negative for constipation, diarrhea and vomiting. Genitourinary: Negative for flank pain. Musculoskeletal: Negative for back pain and neck pain. Neurological: Negative for headaches. Psychiatric/Behavioral: Negative for agitation, behavioral problems and confusion.        Physical Exam  Constitutional:       General: He is awake. He is not in acute distress. Appearance: Normal appearance. HENT:      Head: Normocephalic and atraumatic. Nose: Nose normal.   Eyes:      General: Vision grossly intact. Gaze aligned appropriately. Right eye: No discharge. Left eye: No discharge. Extraocular Movements: Extraocular movements intact. Conjunctiva/sclera: Conjunctivae normal.   Neck:      Musculoskeletal: Full passive range of motion without pain, normal range of motion and neck supple. Cardiovascular:      Rate and Rhythm: Normal rate and regular rhythm. Pulses: Normal pulses. Heart sounds: Normal heart sounds. Pulmonary:      Effort: Pulmonary effort is normal.      Breath sounds: Normal breath sounds. Abdominal:      General: There is no distension. There are no signs of injury. Palpations: Abdomen is soft. Tenderness: There is abdominal tenderness. Musculoskeletal: Normal range of motion. General: No deformity or signs of injury. Skin:     General: Skin is warm. Neurological:      General: No focal deficit present. Mental Status: He is alert, oriented to person, place, and time and easily aroused. Mental status is at baseline. Motor: Motor function is intact. Coordination: Coordination is intact. Gait: Gait is intact. Psychiatric:         Attention and Perception: Attention and perception normal.         Mood and Affect: Mood and affect normal.         Speech: Speech normal.         Behavior: Behavior normal. Behavior is cooperative. Thought Content:  Thought content normal.         Cognition and Memory: Cognition normal.         Judgment: Judgment normal.       LABS:    CBC:   Recent Labs     10/08/20  1953 10/10/20  0448   WBC 10.3 5.8   HGB 14.2 12.4*   HCT 42.7 36.2*    214   MCV 90.6 89.9     Renal:    Recent Labs     10/08/20  1953 10/09/20  0529 10/10/20  0448    136 136   K 4.5 3.9 3.7   CL 99 101 101   CO2 28 25 25   BUN 22* 17 12   CREATININE 1.2 1.0 1.0   GLUCOSE 314* 223* 245*   CALCIUM 10.2 9.0 8.5   MG  --   --  1.80   PHOS  --   --  3.5   ANIONGAP 11 10 10     Hepatic:   Recent Labs     10/08/20  1953 10/09/20  0529 10/10/20  0448   * 215* 90*   * 312* 218*   BILITOT 3.2* 2.6* 1.6*   BILIDIR  --   --  0.6*   PROT 7.4 6.1* 5.3*   LABALBU 4.4 3.5 3.2*   ALKPHOS 604* 495* 447*     Troponin:   Recent Labs     10/08/20  1953   TROPONINI <0.01     BNP: No results for input(s): BNP in the last 72 hours. Lipids: No results for input(s): CHOL, HDL in the last 72 hours. Invalid input(s): LDLCALCU, TRIGLYCERIDE  ABGs:  No results for input(s): PHART, JDD0FXM, PO2ART, PKK6NSB, BEART, THGBART, Z2FKNOWB, BIA5GCO in the last 72 hours. INR: No results for input(s): INR in the last 72 hours. Lactate: No results for input(s): LACTATE in the last 72 hours. Cultures:  -----------------------------------------------------------------  RAD:   FL ERCP BILIARY AND PANCREATIC S&I   Final Result      Intraoperative fluoroscopy was performed. Correlate with procedural note for additional information. US ABDOMEN LIMITED   Final Result      Nondiagnostic evaluation of the gallbladder which is obscured. No acute sonographic abnormality of the abdomen. .             CT ABDOMEN PELVIS WO CONTRAST Additional Contrast? None   Final Result      No acute abdominopelvic abnormality. Contracted gallbladder with questionable wall thickening and small nonspecific densities along the fundus. There also may be gallstones. As described on prior report, follow-up MRI/MRCP should be considered. No definite CT evidence of acute    cholecystitis. Prostatomegaly with circumferential bladder wall thickening, which may be secondary to bladder outlet obstruction versus cystitis. Assessment/Plan:     60M PMH T2DM, HLD, HTN, BPH presented 10/08 w/ abdominal pain x 3 days.     1. Choledocholithiasis  - , , Alk Phos 604 at admission. - CT-AP (10/08): Contracted gallbladder, possible wall thickening. Possible gallstones. - RUQ-US (10/09): No intrahepatic biliary duct dilation. CBD normal in diameter. Sonographic Layton's sign negative. - ERCP (10/09): CBD filling defect c/w choledocholithiasis. Mild CBD dilation.   - Plan: Zosyn 3.375 mg QD. Cholecystectomy/cholangiogram on 10/12. 2. T2DM  - No A1c in EHR.  - Glucose 314 at admission.   - Had taken metformin prior to admission.  - Plan: Will give 5 units NPH this AM. Add 5 units lantus qPM. MDSSI. F/U repeat A1c.    3. DVT PPX  - Lovenox 40 mg QD. 4. Diet  - Low fat / low carb. 5. Disposition  - D/C to home after cholecystectomy.     Code Status: Full    Jose Harper DO, PGY-2  10/10/20  1:26 PM    This patient has been staffed and discussed with Jc Patricia MD.

## 2020-10-10 NOTE — PROGRESS NOTES
Patient alert and oriented times 4 with stable VS.  Patient went down for ERCP. Arrived back to floor at 1845. No complaints of pain or nausea. Started clear liquid diet. Will continue to monitor.

## 2020-10-10 NOTE — PROGRESS NOTES
Pt alert and oriented. VSS. Abdomen soft and tender. Bowel sounds active. Tolerating diet. Denies any pain, nausea or vomiting. Pt ambulating in room independently. Steady gait. Pt calls out appropriately. Will continue to monitor.  Electronically signed by Lynnette Toney RN on 10/10/2020 at 6:40 PM

## 2020-10-11 ENCOUNTER — APPOINTMENT (OUTPATIENT)
Dept: GENERAL RADIOLOGY | Age: 60
DRG: 417 | End: 2020-10-11
Payer: COMMERCIAL

## 2020-10-11 LAB
ABO/RH: NORMAL
ALBUMIN SERPL-MCNC: 3.2 G/DL (ref 3.4–5)
ALP BLD-CCNC: 454 U/L (ref 40–129)
ALT SERPL-CCNC: 172 U/L (ref 10–40)
ANION GAP SERPL CALCULATED.3IONS-SCNC: 10 MMOL/L (ref 3–16)
ANTIBODY SCREEN: NORMAL
AST SERPL-CCNC: 42 U/L (ref 15–37)
BASOPHILS ABSOLUTE: 0.1 K/UL (ref 0–0.2)
BASOPHILS RELATIVE PERCENT: 1.1 %
BILIRUB SERPL-MCNC: 1.4 MG/DL (ref 0–1)
BILIRUBIN DIRECT: 0.5 MG/DL (ref 0–0.3)
BILIRUBIN, INDIRECT: 0.9 MG/DL (ref 0–1)
BUN BLDV-MCNC: 13 MG/DL (ref 7–20)
CALCIUM SERPL-MCNC: 8.9 MG/DL (ref 8.3–10.6)
CHLORIDE BLD-SCNC: 103 MMOL/L (ref 99–110)
CO2: 24 MMOL/L (ref 21–32)
CREAT SERPL-MCNC: 1.1 MG/DL (ref 0.8–1.3)
EOSINOPHILS ABSOLUTE: 0.3 K/UL (ref 0–0.6)
EOSINOPHILS RELATIVE PERCENT: 4.9 %
ESTIMATED AVERAGE GLUCOSE: 211.6 MG/DL
GFR AFRICAN AMERICAN: >60
GFR NON-AFRICAN AMERICAN: >60
GLUCOSE BLD-MCNC: 124 MG/DL (ref 70–99)
GLUCOSE BLD-MCNC: 150 MG/DL (ref 70–99)
GLUCOSE BLD-MCNC: 153 MG/DL (ref 70–99)
GLUCOSE BLD-MCNC: 154 MG/DL (ref 70–99)
GLUCOSE BLD-MCNC: 314 MG/DL (ref 70–99)
HBA1C MFR BLD: 9 %
HCT VFR BLD CALC: 38.6 % (ref 40.5–52.5)
HEMOGLOBIN: 12.8 G/DL (ref 13.5–17.5)
INR BLD: 0.97 (ref 0.86–1.14)
LYMPHOCYTES ABSOLUTE: 2.1 K/UL (ref 1–5.1)
LYMPHOCYTES RELATIVE PERCENT: 35.7 %
MAGNESIUM: 2 MG/DL (ref 1.8–2.4)
MCH RBC QN AUTO: 30.4 PG (ref 26–34)
MCHC RBC AUTO-ENTMCNC: 33.1 G/DL (ref 31–36)
MCV RBC AUTO: 91.7 FL (ref 80–100)
MONOCYTES ABSOLUTE: 0.5 K/UL (ref 0–1.3)
MONOCYTES RELATIVE PERCENT: 8.5 %
NEUTROPHILS ABSOLUTE: 2.9 K/UL (ref 1.7–7.7)
NEUTROPHILS RELATIVE PERCENT: 49.8 %
PDW BLD-RTO: 12.9 % (ref 12.4–15.4)
PERFORMED ON: ABNORMAL
PHOSPHORUS: 3.4 MG/DL (ref 2.5–4.9)
PLATELET # BLD: 243 K/UL (ref 135–450)
PMV BLD AUTO: 8.4 FL (ref 5–10.5)
POTASSIUM SERPL-SCNC: 3.6 MMOL/L (ref 3.5–5.1)
PROTHROMBIN TIME: 11.2 SEC (ref 10–13.2)
RBC # BLD: 4.21 M/UL (ref 4.2–5.9)
SODIUM BLD-SCNC: 137 MMOL/L (ref 136–145)
TOTAL PROTEIN: 5.7 G/DL (ref 6.4–8.2)
VITAMIN B-12: 760 PG/ML (ref 211–911)
VITAMIN D 25-HYDROXY: 40.7 NG/ML
WBC # BLD: 5.8 K/UL (ref 4–11)

## 2020-10-11 PROCEDURE — 6370000000 HC RX 637 (ALT 250 FOR IP): Performed by: INTERNAL MEDICINE

## 2020-10-11 PROCEDURE — 82607 VITAMIN B-12: CPT

## 2020-10-11 PROCEDURE — 80076 HEPATIC FUNCTION PANEL: CPT

## 2020-10-11 PROCEDURE — 99231 SBSQ HOSP IP/OBS SF/LOW 25: CPT | Performed by: SURGERY

## 2020-10-11 PROCEDURE — 6360000002 HC RX W HCPCS: Performed by: STUDENT IN AN ORGANIZED HEALTH CARE EDUCATION/TRAINING PROGRAM

## 2020-10-11 PROCEDURE — 2580000003 HC RX 258: Performed by: STUDENT IN AN ORGANIZED HEALTH CARE EDUCATION/TRAINING PROGRAM

## 2020-10-11 PROCEDURE — 83735 ASSAY OF MAGNESIUM: CPT

## 2020-10-11 PROCEDURE — 86900 BLOOD TYPING SEROLOGIC ABO: CPT

## 2020-10-11 PROCEDURE — 36415 COLL VENOUS BLD VENIPUNCTURE: CPT

## 2020-10-11 PROCEDURE — 1200000000 HC SEMI PRIVATE

## 2020-10-11 PROCEDURE — 86850 RBC ANTIBODY SCREEN: CPT

## 2020-10-11 PROCEDURE — 71045 X-RAY EXAM CHEST 1 VIEW: CPT

## 2020-10-11 PROCEDURE — 80069 RENAL FUNCTION PANEL: CPT

## 2020-10-11 PROCEDURE — 6360000002 HC RX W HCPCS: Performed by: INTERNAL MEDICINE

## 2020-10-11 PROCEDURE — 2580000003 HC RX 258: Performed by: INTERNAL MEDICINE

## 2020-10-11 PROCEDURE — 85610 PROTHROMBIN TIME: CPT

## 2020-10-11 PROCEDURE — 99232 SBSQ HOSP IP/OBS MODERATE 35: CPT | Performed by: NURSE PRACTITIONER

## 2020-10-11 PROCEDURE — 85025 COMPLETE CBC W/AUTO DIFF WBC: CPT

## 2020-10-11 PROCEDURE — 82306 VITAMIN D 25 HYDROXY: CPT

## 2020-10-11 PROCEDURE — 86901 BLOOD TYPING SEROLOGIC RH(D): CPT

## 2020-10-11 RX ORDER — SODIUM CHLORIDE, SODIUM LACTATE, POTASSIUM CHLORIDE, CALCIUM CHLORIDE 600; 310; 30; 20 MG/100ML; MG/100ML; MG/100ML; MG/100ML
INJECTION, SOLUTION INTRAVENOUS CONTINUOUS
Status: DISCONTINUED | OUTPATIENT
Start: 2020-10-12 | End: 2020-10-13

## 2020-10-11 RX ORDER — INSULIN LISPRO 100 [IU]/ML
0-6 INJECTION, SOLUTION INTRAVENOUS; SUBCUTANEOUS
Status: DISCONTINUED | OUTPATIENT
Start: 2020-10-11 | End: 2020-10-12

## 2020-10-11 RX ORDER — INSULIN LISPRO 100 [IU]/ML
0-3 INJECTION, SOLUTION INTRAVENOUS; SUBCUTANEOUS NIGHTLY
Status: DISCONTINUED | OUTPATIENT
Start: 2020-10-11 | End: 2020-10-12

## 2020-10-11 RX ORDER — POTASSIUM CHLORIDE 7.45 MG/ML
10 INJECTION INTRAVENOUS
Status: COMPLETED | OUTPATIENT
Start: 2020-10-11 | End: 2020-10-11

## 2020-10-11 RX ORDER — INSULIN LISPRO 100 [IU]/ML
4 INJECTION, SOLUTION INTRAVENOUS; SUBCUTANEOUS
Status: DISCONTINUED | OUTPATIENT
Start: 2020-10-11 | End: 2020-10-18

## 2020-10-11 RX ORDER — SODIUM CHLORIDE, SODIUM LACTATE, POTASSIUM CHLORIDE, CALCIUM CHLORIDE 600; 310; 30; 20 MG/100ML; MG/100ML; MG/100ML; MG/100ML
INJECTION, SOLUTION INTRAVENOUS CONTINUOUS
Status: DISCONTINUED | OUTPATIENT
Start: 2020-10-11 | End: 2020-10-11

## 2020-10-11 RX ADMIN — PIPERACILLIN AND TAZOBACTAM 3.38 G: 3; .375 INJECTION, POWDER, LYOPHILIZED, FOR SOLUTION INTRAVENOUS at 23:42

## 2020-10-11 RX ADMIN — INSULIN LISPRO 1 UNITS: 100 INJECTION, SOLUTION INTRAVENOUS; SUBCUTANEOUS at 17:30

## 2020-10-11 RX ADMIN — ENOXAPARIN SODIUM 40 MG: 40 INJECTION SUBCUTANEOUS at 10:12

## 2020-10-11 RX ADMIN — POTASSIUM CHLORIDE 10 MEQ: 10 INJECTION, SOLUTION INTRAVENOUS at 08:59

## 2020-10-11 RX ADMIN — INSULIN GLARGINE 5 UNITS: 100 INJECTION, SOLUTION SUBCUTANEOUS at 21:21

## 2020-10-11 RX ADMIN — INSULIN LISPRO 4 UNITS: 100 INJECTION, SOLUTION INTRAVENOUS; SUBCUTANEOUS at 17:30

## 2020-10-11 RX ADMIN — INSULIN LISPRO 4 UNITS: 100 INJECTION, SOLUTION INTRAVENOUS; SUBCUTANEOUS at 11:55

## 2020-10-11 RX ADMIN — Medication 10 ML: at 10:56

## 2020-10-11 RX ADMIN — INSULIN LISPRO 1 UNITS: 100 INJECTION, SOLUTION INTRAVENOUS; SUBCUTANEOUS at 21:22

## 2020-10-11 RX ADMIN — SODIUM CHLORIDE, POTASSIUM CHLORIDE, SODIUM LACTATE AND CALCIUM CHLORIDE: 600; 310; 30; 20 INJECTION, SOLUTION INTRAVENOUS at 23:42

## 2020-10-11 RX ADMIN — INSULIN LISPRO 4 UNITS: 100 INJECTION, SOLUTION INTRAVENOUS; SUBCUTANEOUS at 11:56

## 2020-10-11 RX ADMIN — POTASSIUM CHLORIDE 10 MEQ: 10 INJECTION, SOLUTION INTRAVENOUS at 13:28

## 2020-10-11 RX ADMIN — POTASSIUM CHLORIDE 10 MEQ: 10 INJECTION, SOLUTION INTRAVENOUS at 11:28

## 2020-10-11 RX ADMIN — Medication 10 ML: at 23:42

## 2020-10-11 RX ADMIN — PIPERACILLIN AND TAZOBACTAM 3.38 G: 3; .375 INJECTION, POWDER, LYOPHILIZED, FOR SOLUTION INTRAVENOUS at 06:49

## 2020-10-11 RX ADMIN — PIPERACILLIN AND TAZOBACTAM 3.38 G: 3; .375 INJECTION, POWDER, LYOPHILIZED, FOR SOLUTION INTRAVENOUS at 14:30

## 2020-10-11 RX ADMIN — POTASSIUM CHLORIDE 10 MEQ: 10 INJECTION, SOLUTION INTRAVENOUS at 10:12

## 2020-10-11 ASSESSMENT — PAIN SCALES - GENERAL
PAINLEVEL_OUTOF10: 0

## 2020-10-11 ASSESSMENT — ENCOUNTER SYMPTOMS
RESPIRATORY NEGATIVE: 1
GASTROINTESTINAL NEGATIVE: 1

## 2020-10-11 NOTE — PROGRESS NOTES
Progress Note    Admit Date: 10/8/2020  Diet: DIET LOW FAT; Carb Control: 3 carb choices (45 gms)/meal  Diet NPO, After Midnight    CC: Abdominal pain, nausea, vomiting    Interval history: Patient doing well. Tolerating diet. No abdominal pain, N/V. No acute overnight events. Surgery tomorrow.   -Insulin adjusted; 5 units nightly, 4 units with meals and low-dose sliding scale. Medications:     Scheduled Meds:   potassium chloride  10 mEq Intravenous Q1H    insulin glargine  5 Units Subcutaneous Nightly    sodium chloride flush  10 mL Intravenous 2 times per day    enoxaparin  40 mg Subcutaneous Daily    influenza virus vaccine  0.5 mL Intramuscular Once    piperacillin-tazobactam  3.375 g Intravenous Q8H    insulin lispro  0-12 Units Subcutaneous TID WC    insulin lispro  0-6 Units Subcutaneous Nightly     Continuous Infusions:   dextrose       PRN Meds:sodium chloride flush, polyethylene glycol, promethazine **OR** ondansetron, glucose, dextrose, glucagon (rDNA), dextrose, dextrose    Objective:   Vitals:   T-max:  No data found. Intake/Output Summary (Last 24 hours) at 10/11/2020 0826  Last data filed at 10/11/2020 0649  Gross per 24 hour   Intake 1070 ml   Output 2750 ml   Net -1680 ml       Review of Systems   Constitutional: Negative. HENT: Negative. Respiratory: Negative. Cardiovascular: Negative. Gastrointestinal: Negative. Genitourinary: Negative. Musculoskeletal: Negative. Skin: Negative. Neurological: Negative. Psychiatric/Behavioral: Negative. Physical Exam  Constitutional:       General: He is not in acute distress. Appearance: Normal appearance. Cardiovascular:      Rate and Rhythm: Normal rate and regular rhythm. Pulses: Normal pulses. Heart sounds: Normal heart sounds. Pulmonary:      Effort: Pulmonary effort is normal.      Breath sounds: Normal breath sounds. Abdominal:      General: Abdomen is flat.  Bowel sounds are normal. Palpations: Abdomen is soft. Musculoskeletal: Normal range of motion. Skin:     General: Skin is warm and dry. Capillary Refill: Capillary refill takes less than 2 seconds. Neurological:      General: No focal deficit present. Mental Status: He is alert. Psychiatric:         Mood and Affect: Mood normal.         LABS:    CBC:   Recent Labs     10/08/20  1953 10/10/20  0448 10/11/20  0459   WBC 10.3 5.8 5.8   HGB 14.2 12.4* 12.8*   HCT 42.7 36.2* 38.6*    214 243   MCV 90.6 89.9 91.7     Renal:    Recent Labs     10/09/20  0529 10/10/20  0448 10/11/20  0459    136 137   K 3.9 3.7 3.6    101 103   CO2 25 25 24   BUN 17 12 13   CREATININE 1.0 1.0 1.1   GLUCOSE 223* 245* 150*   CALCIUM 9.0 8.5 8.9   MG  --  1.80 2.00   PHOS  --  3.5 3.4   ANIONGAP 10 10 10     Hepatic:   Recent Labs     10/09/20  0529 10/10/20  0448 10/11/20  0459   * 90* 42*   * 218* 172*   BILITOT 2.6* 1.6* 1.4*   BILIDIR  --  0.6* 0.5*   PROT 6.1* 5.3* 5.7*   LABALBU 3.5 3.2* 3.2*   ALKPHOS 495* 447* 454*     Troponin:   Recent Labs     10/08/20  1953   TROPONINI <0.01     BNP: No results for input(s): BNP in the last 72 hours. Lipids: No results for input(s): CHOL, HDL in the last 72 hours. Invalid input(s): LDLCALCU, TRIGLYCERIDE  ABGs:  No results for input(s): PHART, QYT9JBK, PO2ART, JRH0PDQ, BEART, THGBART, E3POBRWI, SVU2FQA in the last 72 hours. INR: No results for input(s): INR in the last 72 hours. Lactate: No results for input(s): LACTATE in the last 72 hours. Cultures:  -----------------------------------------------------------------  RAD:   FL ERCP BILIARY AND PANCREATIC S&I   Final Result      Intraoperative fluoroscopy was performed. Correlate with procedural note for additional information. US ABDOMEN LIMITED   Final Result      Nondiagnostic evaluation of the gallbladder which is obscured. No acute sonographic abnormality of the abdomen. .             CT ABDOMEN PELVIS WO CONTRAST Additional Contrast? None   Final Result      No acute abdominopelvic abnormality. Contracted gallbladder with questionable wall thickening and small nonspecific densities along the fundus. There also may be gallstones. As described on prior report, follow-up MRI/MRCP should be considered. No definite CT evidence of acute    cholecystitis. Prostatomegaly with circumferential bladder wall thickening, which may be secondary to bladder outlet obstruction versus cystitis. Assessment/Plan:   Choledocholithiasis  - , , Alk Phos 604 at admission. - CT-AP (10/08): Contracted gallbladder, possible wall thickening. Possible gallstones. - RUQ-US (10/09): No intrahepatic biliary duct dilation. CBD normal in diameter. Sonographic Layton's sign negative. - ERCP (10/09): CBD filling defect c/w choledocholithiasis. Mild CBD dilation.   - Plan: Zosyn 3.375 mg QD. Cholecystectomy/cholangiogram on 10/12.      T2DM  - Had taken metformin prior to admission. -.Add 5 units lantus qPM. LDSSI. Added 4 units with meals.   F/U repeat A1c.      DVT PPX- Lovenox 40 mg QD.   Diet- Low fat / low carb.   Disposition- D/C to home after cholecystectomy.   Code Status: Full     Narvis Kanner, MD, PGY-1  10/11/20  8:26 AM    This patient has been staffed and discussed with Cinyd Segura MD.

## 2020-10-11 NOTE — PROGRESS NOTES
Tele removed, IV potassium completed.  Electronically signed by Deondre Bhat RN on 10/11/2020 at 2:39 PM

## 2020-10-11 NOTE — PROGRESS NOTES
[facility] [address]       In Patient  Progress note        Austin Ramirez MD,  [address] FNP [address] [facility]   Daily Progress Note      Admit Date:  10/8/2020  Primary Cardiologist : Gemma Browne       CC: Interval Hx:   Mateo marie 56 y. o. patient whom we were asked to see pre op/abn ekg/acute cholecystitis. Presents with RUQ pain for past several days.  Noted elevated LFT/bili.  Felt to have acute cholecystitis and is in need of urgent surgery.  Has no prior cardiac hx. very active including exercise.  No exertional chest pain/sob. Denies palp/tachy/syncope. No edema.  No pnd or orthopnea. , , Alk Phos 604 at admission. Today resting in bed, VSS afebrile b/p soft stable on RA SV02 98%   Net-1.6 liters    Chest xray today   Bibasilar minimal linear atelectasis. Plan for tomorrow:  Robotic assisted cholecystectomy with intraoperative cholangiogram    due to limiting exposure to COVID-19 and minimizing use of PPE, note was completed solely using EMR and from personal conversations with primary medical team and/or consultants involved in case. Patient was not interviewed or examined. I have personally reviewed the reports and images of labs, radiological studies, cardiac studies including ECG's and telemetry, current and old medical records. The note was completed using EMR and Dragon dictation system. Every effort was made to ensure accuracy; however, inadvertent computerized transcription errors may be present.     Objective:   /69   Pulse 54   Temp 97.7 °F (36.5 °C) (Oral)   Resp 16   Ht 6' (1.829 m)   Wt 178 lb (80.7 kg)   SpO2 98%   BMI 24.14 kg/m²       Intake/Output Summary (Last 24 hours) at 10/11/2020 1205  Last data filed at 10/11/2020 1202  Gross per 24 hour   Intake 1310 ml   Output 2750 ml   Net -1440 ml     Wt Readings from Last 3 Encounters:   10/08/20 178 lb (80.7 kg)   04/08/20 198 lb (89.8 kg)   02/26/20 198 lb 3.2 oz (89.9 kg)       Physical Exam:   /69   Pulse 54   Temp 97.7 °F (36.5 °C) (Oral)   Resp 16   Ht 6' (1.829 m)   Wt 178 lb (80.7 kg)   SpO2 98%   BMI 24.14 kg/m²   BP Readings from Last 3 Encounters:   10/11/20 103/69   10/09/20 (!) 85/59   02/26/20 128/75     Pulse Readings from Last 3 Encounters:   10/11/20 54   04/08/20 60   02/26/20 79       Intake/Output Summary (Last 24 hours) at 10/11/2020 1205  Last data filed at 10/11/2020 1202  Gross per 24 hour   Intake 1310 ml   Output 2750 ml   Net -1440 ml     Wt Readings from Last 2 Encounters:   10/08/20 178 lb (80.7 kg)   04/08/20 198 lb (89.8 kg)     Constitutional: He is oriented to person, place, and time. He appears well-developed and well-nourished. In no acute distress. Head: Normocephalic and atraumatic. Eyes: PEERL   Neck: Neck supple. No JVD present. Carotid bruit is not present. No mass and no thyromegaly present. No lymphadenopathy present. Cardiovascular: Normal rate, regular rhythm, normal heart sounds and intact distal pulses. Exam reveals no gallop and no friction rub. No murmur heard. Pulmonary/Chest: Effort normal and breath sounds normal. No respiratory distress. He has no wheezes, rhonchi or rales. Abdominal: Soft, non-tender. Bowel sounds and aorta are normal. He exhibits no organomegaly, mass or bruit. Extremities: No edema, cyanosis, or clubbing. Pulses are 2+ radial/carotid/dorsalis pedis and posterior tibial bilaterally. Neurological: oriented to person place    Skin: Skin is warm and dry. There is no rash or diaphoresis. Psychiatric: He has a normal mood and affect.  His speech is normal and behavior is normal.     Medications:    insulin lispro  4 Units Subcutaneous TID WC    insulin lispro  0-6 Units Subcutaneous TID WC    insulin lispro  0-3 Units Subcutaneous Nightly    insulin glargine  5 Units Subcutaneous Nightly    sodium chloride flush  10 mL Intravenous 2 times per day    enoxaparin  40 mg Subcutaneous Daily    influenza virus vaccine  0.5 mL Intramuscular Once    piperacillin-tazobactam  3.375 g Intravenous Q8H      [START ON 10/12/2020] lactated ringers      dextrose       Recent Labs     10/08/20  1953 10/10/20  0448 10/11/20  0459   WBC 10.3 5.8 5.8   HGB 14.2 12.4* 12.8*    214 243     BMP:    Recent Labs     10/09/20  0529 10/10/20  0448 10/11/20  0459    136 137   K 3.9 3.7 3.6   CO2 25 25 24   BUN 17 12 13   CREATININE 1.0 1.0 1.1     LIVR:   Recent Labs     10/09/20  0529 10/10/20  0448 10/11/20  0459   * 90* 42*   * 218* 172*     INR:    Recent Labs     10/11/20  0952   INR 0.97     Reviewed  available lab work,  EKGs, images, [facility]     Assessment    Choledocholithiasis  Plan lap lynda Monday with Dr Jennifer Pugh     EKG shows NSR, IRBBB,  septal MI Au.  Reviewed previous EKGs this is similar to EKG 11/17.     Plan:  Plan for tomorrow:  Robotic assisted cholecystectomy with intraoperative cholangiogram  In Covid pending isolation    Echo when out of isolation     [place] 48, CVNP

## 2020-10-11 NOTE — PROGRESS NOTES
Surgery Daily Progress Note  Mariia Vasquez    CC: epigastric abdominal pain     Subjective :  Patient resting well overnight. States that abdominal pain is improved. Tolerating a low fat diet with no nausea or vomiting. ERCP completed 10/9 with sphincterotomy and stone extraction. Patient planned for Robotic/Laparoscopic cholecystectomy, possible open with IOC on Monday. Objective    Infusions:   dextrose          I/O:I/O last 3 completed shifts: In: 1842 [P.O.:1320; I.V.:322; IV Piggyback:200]  Out: 2475 [Urine:2475]           Wt Readings from Last 1 Encounters:   10/08/20 178 lb (80.7 kg)                 LABS:   Recent Labs     10/08/20  1953 10/10/20  0448   WBC 10.3 5.8   HGB 14.2 12.4*   HCT 42.7 36.2*   MCV 90.6 89.9    214        Recent Labs     10/09/20  0529 10/10/20  0448    136   K 3.9 3.7    101   CO2 25 25   PHOS  --  3.5   BUN 17 12   CREATININE 1.0 1.0        Recent Labs     10/09/20  0529 10/10/20  0448   * 90*   * 218*   BILIDIR  --  0.6*   BILITOT 2.6* 1.6*   ALKPHOS 495* 447*        Recent Labs     10/08/20  1953   LIPASE 55.0        Recent Labs     10/09/20  0529 10/10/20  0448   PROT 6.1* 5.3*        Recent Labs     10/08/20  1953   TROPONINI <0.01            Exam:  BP (!) 146/74   Pulse 64   Temp 97.8 °F (36.6 °C) (Oral)   Resp 14   Ht 6' (1.829 m)   Wt 178 lb (80.7 kg)   SpO2 96%   BMI 24.14 kg/m²     CONSTITUTIONAL:  awake, alert & oriented x 3, cooperative, no apparent distress, and appears stated age  HEENT: No palpable lymphadenopathy, no icterus  LUNGS: No increased work of breathing, clear to auscultation bilaterally, no crackles or wheezing  CV:  Regular rate and rhythm, normal S1 and S2, and no murmur noted  ABDOMEN: Soft,  Non tender , non-distended,     ASSESSMENT/PLAN:   Pt. is a 56 y.o. male with choledocholithiasis s/p ERCP 10/9 with sphincterotomy and stone extraction     · Follow up AM hepatic panel.    · Continue low fat diet - make NPO at midnight  · Patient needs improved glucose control- internal medicine placed on 5 units NPH and 5 units of Lantus qPM, with medium dose sliding scale insulin. · Pre-op today for Robotic laparoscopic assisted cholecystectomy with intraoperative cholangiogram on Monday 10/12 with Dr. Cece Goss.    · Continue Zosyn until OR   · General surgery team to follow closely       Norma Ruiz DO  12:59 AM  10/11/2020   [phone]

## 2020-10-12 ENCOUNTER — APPOINTMENT (OUTPATIENT)
Dept: MRI IMAGING | Age: 60
DRG: 417 | End: 2020-10-12
Payer: COMMERCIAL

## 2020-10-12 ENCOUNTER — ANESTHESIA EVENT (OUTPATIENT)
Dept: OPERATING ROOM | Age: 60
DRG: 417 | End: 2020-10-12
Payer: COMMERCIAL

## 2020-10-12 LAB
ALBUMIN SERPL-MCNC: 3.5 G/DL (ref 3.4–5)
ALP BLD-CCNC: 429 U/L (ref 40–129)
ALT SERPL-CCNC: 134 U/L (ref 10–40)
ANION GAP SERPL CALCULATED.3IONS-SCNC: 11 MMOL/L (ref 3–16)
AST SERPL-CCNC: 31 U/L (ref 15–37)
BASOPHILS ABSOLUTE: 0.1 K/UL (ref 0–0.2)
BASOPHILS RELATIVE PERCENT: 1 %
BILIRUB SERPL-MCNC: 1.1 MG/DL (ref 0–1)
BILIRUBIN DIRECT: 0.3 MG/DL (ref 0–0.3)
BILIRUBIN, INDIRECT: 0.8 MG/DL (ref 0–1)
BUN BLDV-MCNC: 13 MG/DL (ref 7–20)
CALCIUM SERPL-MCNC: 9.2 MG/DL (ref 8.3–10.6)
CHLORIDE BLD-SCNC: 105 MMOL/L (ref 99–110)
CO2: 23 MMOL/L (ref 21–32)
CREAT SERPL-MCNC: 1.2 MG/DL (ref 0.8–1.3)
EOSINOPHILS ABSOLUTE: 0.3 K/UL (ref 0–0.6)
EOSINOPHILS RELATIVE PERCENT: 4.4 %
GFR AFRICAN AMERICAN: >60
GFR NON-AFRICAN AMERICAN: >60
GLUCOSE BLD-MCNC: 145 MG/DL (ref 70–99)
GLUCOSE BLD-MCNC: 188 MG/DL (ref 70–99)
GLUCOSE BLD-MCNC: 260 MG/DL (ref 70–99)
GLUCOSE BLD-MCNC: 262 MG/DL (ref 70–99)
GLUCOSE BLD-MCNC: 271 MG/DL (ref 70–99)
HCT VFR BLD CALC: 39.3 % (ref 40.5–52.5)
HEMOGLOBIN: 13.1 G/DL (ref 13.5–17.5)
LV EF: 58 %
LVEF MODALITY: NORMAL
LYMPHOCYTES ABSOLUTE: 2.3 K/UL (ref 1–5.1)
LYMPHOCYTES RELATIVE PERCENT: 35.5 %
MAGNESIUM: 1.9 MG/DL (ref 1.8–2.4)
MCH RBC QN AUTO: 30.5 PG (ref 26–34)
MCHC RBC AUTO-ENTMCNC: 33.3 G/DL (ref 31–36)
MCV RBC AUTO: 91.6 FL (ref 80–100)
MONOCYTES ABSOLUTE: 0.6 K/UL (ref 0–1.3)
MONOCYTES RELATIVE PERCENT: 8.5 %
NEUTROPHILS ABSOLUTE: 3.3 K/UL (ref 1.7–7.7)
NEUTROPHILS RELATIVE PERCENT: 50.6 %
PDW BLD-RTO: 13 % (ref 12.4–15.4)
PERFORMED ON: ABNORMAL
PHOSPHORUS: 3.2 MG/DL (ref 2.5–4.9)
PLATELET # BLD: 238 K/UL (ref 135–450)
PMV BLD AUTO: 8.5 FL (ref 5–10.5)
POTASSIUM SERPL-SCNC: 4.1 MMOL/L (ref 3.5–5.1)
RBC # BLD: 4.29 M/UL (ref 4.2–5.9)
SODIUM BLD-SCNC: 139 MMOL/L (ref 136–145)
TOTAL PROTEIN: 5.9 G/DL (ref 6.4–8.2)
WBC # BLD: 6.6 K/UL (ref 4–11)

## 2020-10-12 PROCEDURE — 83735 ASSAY OF MAGNESIUM: CPT

## 2020-10-12 PROCEDURE — 74183 MRI ABD W/O CNTR FLWD CNTR: CPT

## 2020-10-12 PROCEDURE — 99232 SBSQ HOSP IP/OBS MODERATE 35: CPT | Performed by: NURSE PRACTITIONER

## 2020-10-12 PROCEDURE — 99255 IP/OBS CONSLTJ NEW/EST HI 80: CPT | Performed by: SURGERY

## 2020-10-12 PROCEDURE — 2580000003 HC RX 258: Performed by: INTERNAL MEDICINE

## 2020-10-12 PROCEDURE — 2580000003 HC RX 258: Performed by: STUDENT IN AN ORGANIZED HEALTH CARE EDUCATION/TRAINING PROGRAM

## 2020-10-12 PROCEDURE — 85025 COMPLETE CBC W/AUTO DIFF WBC: CPT

## 2020-10-12 PROCEDURE — 80069 RENAL FUNCTION PANEL: CPT

## 2020-10-12 PROCEDURE — 6370000000 HC RX 637 (ALT 250 FOR IP): Performed by: INTERNAL MEDICINE

## 2020-10-12 PROCEDURE — 80076 HEPATIC FUNCTION PANEL: CPT

## 2020-10-12 PROCEDURE — 93306 TTE W/DOPPLER COMPLETE: CPT

## 2020-10-12 PROCEDURE — 6360000002 HC RX W HCPCS: Performed by: INTERNAL MEDICINE

## 2020-10-12 PROCEDURE — A9576 INJ PROHANCE MULTIPACK: HCPCS | Performed by: SURGERY

## 2020-10-12 PROCEDURE — 36415 COLL VENOUS BLD VENIPUNCTURE: CPT

## 2020-10-12 PROCEDURE — 6360000002 HC RX W HCPCS: Performed by: STUDENT IN AN ORGANIZED HEALTH CARE EDUCATION/TRAINING PROGRAM

## 2020-10-12 PROCEDURE — 6360000004 HC RX CONTRAST MEDICATION: Performed by: SURGERY

## 2020-10-12 PROCEDURE — 1200000000 HC SEMI PRIVATE

## 2020-10-12 RX ORDER — LABETALOL 20 MG/4 ML (5 MG/ML) INTRAVENOUS SYRINGE
5 EVERY 10 MIN PRN
Status: DISCONTINUED | OUTPATIENT
Start: 2020-10-12 | End: 2020-10-13 | Stop reason: HOSPADM

## 2020-10-12 RX ORDER — FENTANYL CITRATE 50 UG/ML
25 INJECTION, SOLUTION INTRAMUSCULAR; INTRAVENOUS EVERY 5 MIN PRN
Status: DISCONTINUED | OUTPATIENT
Start: 2020-10-12 | End: 2020-10-13 | Stop reason: HOSPADM

## 2020-10-12 RX ORDER — HYDRALAZINE HYDROCHLORIDE 20 MG/ML
5 INJECTION INTRAMUSCULAR; INTRAVENOUS EVERY 5 MIN PRN
Status: DISCONTINUED | OUTPATIENT
Start: 2020-10-12 | End: 2020-10-13 | Stop reason: HOSPADM

## 2020-10-12 RX ORDER — MAGNESIUM SULFATE IN WATER 40 MG/ML
2 INJECTION, SOLUTION INTRAVENOUS ONCE
Status: COMPLETED | OUTPATIENT
Start: 2020-10-12 | End: 2020-10-12

## 2020-10-12 RX ORDER — ONDANSETRON 2 MG/ML
4 INJECTION INTRAMUSCULAR; INTRAVENOUS
Status: ACTIVE | OUTPATIENT
Start: 2020-10-12 | End: 2020-10-12

## 2020-10-12 RX ORDER — INSULIN LISPRO 100 [IU]/ML
0-6 INJECTION, SOLUTION INTRAVENOUS; SUBCUTANEOUS NIGHTLY
Status: DISCONTINUED | OUTPATIENT
Start: 2020-10-12 | End: 2020-10-18

## 2020-10-12 RX ORDER — FENTANYL CITRATE 50 UG/ML
50 INJECTION, SOLUTION INTRAMUSCULAR; INTRAVENOUS EVERY 5 MIN PRN
Status: DISCONTINUED | OUTPATIENT
Start: 2020-10-12 | End: 2020-10-13 | Stop reason: HOSPADM

## 2020-10-12 RX ORDER — ENALAPRILAT 2.5 MG/2ML
1.25 INJECTION INTRAVENOUS
Status: ACTIVE | OUTPATIENT
Start: 2020-10-12 | End: 2020-10-12

## 2020-10-12 RX ORDER — INSULIN LISPRO 100 [IU]/ML
0-12 INJECTION, SOLUTION INTRAVENOUS; SUBCUTANEOUS
Status: DISCONTINUED | OUTPATIENT
Start: 2020-10-12 | End: 2020-10-18

## 2020-10-12 RX ADMIN — MAGNESIUM SULFATE 2 G: 2 INJECTION INTRAVENOUS at 09:30

## 2020-10-12 RX ADMIN — INSULIN LISPRO 4 UNITS: 100 INJECTION, SOLUTION INTRAVENOUS; SUBCUTANEOUS at 09:18

## 2020-10-12 RX ADMIN — INSULIN LISPRO 3 UNITS: 100 INJECTION, SOLUTION INTRAVENOUS; SUBCUTANEOUS at 20:23

## 2020-10-12 RX ADMIN — INSULIN GLARGINE 5 UNITS: 100 INJECTION, SOLUTION SUBCUTANEOUS at 20:24

## 2020-10-12 RX ADMIN — GADOTERIDOL 18 ML: 279.3 INJECTION, SOLUTION INTRAVENOUS at 08:53

## 2020-10-12 RX ADMIN — INSULIN LISPRO 2 UNITS: 100 INJECTION, SOLUTION INTRAVENOUS; SUBCUTANEOUS at 12:08

## 2020-10-12 RX ADMIN — INSULIN LISPRO 3 UNITS: 100 INJECTION, SOLUTION INTRAVENOUS; SUBCUTANEOUS at 09:18

## 2020-10-12 RX ADMIN — PIPERACILLIN AND TAZOBACTAM 3.38 G: 3; .375 INJECTION, POWDER, LYOPHILIZED, FOR SOLUTION INTRAVENOUS at 18:24

## 2020-10-12 RX ADMIN — INSULIN LISPRO 2 UNITS: 100 INJECTION, SOLUTION INTRAVENOUS; SUBCUTANEOUS at 18:04

## 2020-10-12 RX ADMIN — INSULIN LISPRO 4 UNITS: 100 INJECTION, SOLUTION INTRAVENOUS; SUBCUTANEOUS at 12:08

## 2020-10-12 RX ADMIN — ENOXAPARIN SODIUM 40 MG: 40 INJECTION SUBCUTANEOUS at 09:31

## 2020-10-12 RX ADMIN — PIPERACILLIN AND TAZOBACTAM 3.38 G: 3; .375 INJECTION, POWDER, LYOPHILIZED, FOR SOLUTION INTRAVENOUS at 09:30

## 2020-10-12 RX ADMIN — INSULIN LISPRO 4 UNITS: 100 INJECTION, SOLUTION INTRAVENOUS; SUBCUTANEOUS at 18:04

## 2020-10-12 RX ADMIN — Medication 10 ML: at 09:30

## 2020-10-12 ASSESSMENT — PAIN SCALES - GENERAL
PAINLEVEL_OUTOF10: 0

## 2020-10-12 ASSESSMENT — PAIN DESCRIPTION - PROGRESSION
CLINICAL_PROGRESSION: NOT CHANGED
CLINICAL_PROGRESSION: NOT CHANGED

## 2020-10-12 ASSESSMENT — PAIN DESCRIPTION - LOCATION
LOCATION: ABDOMEN
LOCATION: ABDOMEN

## 2020-10-12 ASSESSMENT — PAIN DESCRIPTION - DESCRIPTORS
DESCRIPTORS: DISCOMFORT
DESCRIPTORS: DISCOMFORT

## 2020-10-12 ASSESSMENT — PAIN DESCRIPTION - PAIN TYPE
TYPE: ACUTE PAIN
TYPE: ACUTE PAIN

## 2020-10-12 ASSESSMENT — PAIN - FUNCTIONAL ASSESSMENT: PAIN_FUNCTIONAL_ASSESSMENT: ACTIVITIES ARE NOT PREVENTED

## 2020-10-12 ASSESSMENT — PAIN DESCRIPTION - ONSET
ONSET: GRADUAL
ONSET: GRADUAL

## 2020-10-12 ASSESSMENT — PAIN DESCRIPTION - FREQUENCY
FREQUENCY: INTERMITTENT
FREQUENCY: INTERMITTENT

## 2020-10-12 NOTE — PROGRESS NOTES
CC:pre-op lynda. HPI:     S: No chest pain or sob. Tele: none     O:  Physical Exam:  /67   Pulse 60   Temp 98 °F (36.7 °C) (Oral)   Resp 18   Ht 6' (1.829 m)   Wt 178 lb (80.7 kg)   SpO2 98%   BMI 24.14 kg/m²    General (appearance):  No acute distress  Eyes: anicteric   Neck: soft, No JVD  Ears/Nose/Mouth/Thorat: No cyanosis  CV: RRR   Respiratory:  Clear, normal effort  GI: soft, non-tender, non-distended  Skin: Warm, dry. No rashes  Neuro/Psych: Alert and oriented x3. Appropriate behavior  Ext:  No c/c. No edema  Pulses:  2+ radial     I.O's=     Weight  Admission: Weight: 178 lb (80.7 kg)   Today: Weight: 178 lb (80.7 kg)    CBC:   Recent Labs     10/10/20  0448 10/11/20  0459 10/12/20  0525   WBC 5.8 5.8 6.6   HGB 12.4* 12.8* 13.1*   HCT 36.2* 38.6* 39.3*   MCV 89.9 91.7 91.6    243 238     BMP:   Recent Labs     10/10/20  0448 10/11/20  0459 10/12/20  0525    137 139   K 3.7 3.6 4.1    103 105   CO2 25 24 23   PHOS 3.5 3.4 3.2   BUN 12 13 13   CREATININE 1.0 1.1 1.2     Mag:   Lab Results   Component Value Date    MG 1.90 10/12/2020     LIVER PROFILE:   Recent Labs     10/10/20  0448 10/11/20  0459 10/12/20  0525   AST 90* 42* 31   * 172* 134*   BILIDIR 0.6* 0.5* 0.3   BILITOT 1.6* 1.4* 1.1*   ALKPHOS 447* 454* 429*     PT/INR:   Recent Labs     10/11/20  0952   PROTIME 11.2   INR 0.97     Imaging:    10/12/2020 Echo:   Normal left ventricle size, wall thickness, and systolic function with an   estimated ejection fraction of 55-60%. No regional wall motion abnormalities   Diastolic filling parameters suggests normal diastolic function. Mild tricuspid regurgitation. Estimated pulmonary artery systolic pressure is 33 mmHg assuming a right   atrial pressure of 3 mmHg. 10/12/2020 MRI abd     1. Extensive gallbladder wall thickening with multiple signal void suggesting extensive cholelithiasis and possible impacted stones in the wall the gallbladder.  Diffuse thickening with enhancement may reflect adenomyomatosis or chronic inflammation. Gallbladder neoplasm is not excluded. In general the degree of wall thickening appears to be improved in comparison to a previous CT exam from 2019.    2. Wall thickening and suspected inflammatory changes extend into the neck of the gallbladder and results in mild stenosis of the common hepatic duct. This does not result in significant ductal dilation within the liver. Neoplastic abnormality would be    difficult to exclude. 3. No evidence of choledocholithiasis. 10/11/2020 CXR:     No focal consolidation. Bibasilar minimal linear atelectasis. No pleural effusion or pneumothorax. Cardiac silhouette is normal     10/8/2020 CT abd/pelvis     No acute abdominopelvic abnormality.         Contracted gallbladder with questionable wall thickening and small nonspecific densities along the fundus. There also may be gallstones. As described on prior report, follow-up MRI/MRCP should be considered. No definite CT evidence of acute    cholecystitis.         Prostatomegaly with circumferential bladder wall thickening, which may be secondary to bladder outlet obstruction versus cystitis. 10/9/2020 ECG: Sinus tiffany, RAD, IRBBB. No change from 2017 ECG     Assessment:  56 y.o. with abdominal pain and acute cholecystitis. Cardiology consulted for pre-op clearance for ? Sara. Issues  -Pre-op eval  -Abnormal ecg  -Acute cholecystitis   -DM     Plan:  Echo demonstrated EF 55-60% and normal wall motion  No further cardiac testing prior to surgery.

## 2020-10-12 NOTE — PLAN OF CARE
Problem: Pain:  Goal: Pain level will decrease  Description: Pain level will decrease  Outcome: Ongoing  Note: Patient denies pain at this moment. Patient verbalizes understanding of pain scale. Will re-assess pain levels as neeeded       Problem: Falls - Risk of:  Goal: Will remain free from falls  Description: Will remain free from falls  10/12/2020 0935 by Roma Davis  Outcome: Ongoing  Note: Patient UAL w/ a steady gait. Uses call light appropriately. Will continue to monitor as needed.

## 2020-10-12 NOTE — PROGRESS NOTES
VSS, afebrile. Pt denies pain and nausea. Pt now NPO since midnight for surgery later today. LR started as ordered. Pt sleeping now. Resps easy and even. Call light in reach. Will continue to monitor.   Electronically signed by Emma Horn RN on 10/12/20 at 2:43 AM EDT

## 2020-10-12 NOTE — ANESTHESIA PRE PROCEDURE
Department of Anesthesiology  Preprocedure Note       Name:  Silviano Guerra   Age:  56 y.o.  :  1960                                          MRN:  [id number]         Date:  10/12/2020      Surgeon: Shereen Peres):  Esme Nash MD    Procedure: Procedure(s):  ROBOTIC CHOLECYSTECTOMY LAPAROSCOPIC AND CHOLANGIOGRAM    Medications prior to admission:   Prior to Admission medications    Medication Sig Start Date End Date Taking?  Authorizing Provider   metFORMIN (GLUCOPHAGE) 1000 MG tablet TAKE 1 TABLET BY MOUTH TWICE DAILY WITH MEALS FOR DIABETES 20  Yes Cari Melton MD   Multiple Vitamins-Minerals (THERAPEUTIC MULTIVITAMIN-MINERALS) tablet Take 1 tablet by mouth daily   Yes Historical Provider, MD       Current medications:    Current Facility-Administered Medications   Medication Dose Route Frequency Provider Last Rate Last Dose    insulin lispro (1 Unit Dial) 0-12 Units  0-12 Units Subcutaneous TID  Jessica Fountain MD   2 Units at 10/12/20 1208    insulin lispro (1 Unit Dial) 0-6 Units  0-6 Units Subcutaneous Nightly Jessica Fountain MD        lactated ringers infusion   Intravenous Continuous Lisa Rossi  mL/hr at 10/11/20 2342      insulin lispro (1 Unit Dial) 4 Units  4 Units Subcutaneous TID  Dinorah Washington MD   4 Units at 10/12/20 1208    insulin glargine (LANTUS;BASAGLAR) injection pen 5 Units  5 Units Subcutaneous Nightly Dinorah Washington MD   5 Units at 10/11/20 2121    sodium chloride flush 0.9 % injection 10 mL  10 mL Intravenous 2 times per day Eligio Prescott MD   10 mL at 10/12/20 0930    sodium chloride flush 0.9 % injection 10 mL  10 mL Intravenous PRN Eligio Prescott MD        polyethylene glycol (GLYCOLAX) packet 17 g  17 g Oral Daily PRN Eligio Prescott MD        promethazine (PHENERGAN) tablet 12.5 mg  12.5 mg Oral Q6H PRN Eligio Prescott MD        Or    ondansetron [facility]) injection 4 mg  4 mg Intravenous Q6H PRN Eligio Prescott MD        enoxaparin (LOVENOX) injection 40 mg  40 mg Subcutaneous Daily Vivian Treviño MD   40 mg at 10/12/20 0931    glucose (GLUTOSE) 40 % oral gel 15 g  15 g Oral PRN Vivian Treviño MD        dextrose 50 % IV solution  12.5 g Intravenous PRN Vivian Treviño MD        glucagon (rDNA) injection 1 mg  1 mg Intramuscular PRN Vivian Treviño MD        dextrose 5 % solution  100 mL/hr Intravenous PRN Vivian Treviño MD        dextrose 50 % IV solution  12.5 g Intravenous PRN Dickson Mancera MD        influenza quadrivalent split vaccine (FLUZONE;FLUARIX;FLULAVAL;AFLURIA) injection 0.5 mL  0.5 mL Intramuscular Once Radha Gone A Myla, DO        piperacillin-tazobactam (ZOSYN) 3.375 g in sodium chloride 0.9 % 100 mL IVPB (mini-bag)  3.375 g Intravenous Robert Beaver MD 25 mL/hr at 10/12/20 0930 3.375 g at 10/12/20 0930       Allergies:     Allergies   Allergen Reactions    Shellfish-Derived Products Anaphylaxis     Throat swelling    Ibuprofen Swelling     swelling       Problem List:    Patient Active Problem List   Diagnosis Code    Urinary frequency R35.0    Diabetes mellitus ([facility] [address] E11.9    Elevated LDL cholesterol level E78.00    Essential hypertension, benign I10    Elevated bilirubin R17    Nonspecific abnormal results of liver function study R94.5    Urinary hesitancy R39.11    Benign prostatic hyperplasia N40.0    Penile discharge R36.9    Closed displaced fracture of proximal phalanx of left ring finger S62.615A    Closed displaced fracture of middle phalanx of left little finger S62.627A    Gross hematuria R31.0    BPH with urinary obstruction N40.1, N13.8    Choledocholithiasis K80.50    Acute calculous cholecystitis K80.00    Transaminitis R74.01    Pre-op evaluation Z01.818    Abnormal EKG R94.31       Past Medical History:        Diagnosis Date    Arthritis     BPH (benign prostatic hyperplasia)     BPH (benign prostatic hypertrophy) 9/2/2015    Clostridium difficile infection 10/24/2016    Diabetes mellitus ([facility] [address] 2012    Elevated LDL cholesterol level 2015    Essential hypertension, benign 2015    S/P colonoscopy 2011    Nml per pt'.  Wears glasses        Past Surgical History:        Procedure Laterality Date    COLONOSCOPY      ERCP  10/9/2020    ERCP STONE REMOVAL performed by Stephen Franco MD at [facility] ERCP  10/9/2020    ERCP SPHINCTER/PAPILLOTOMY performed by Stephen Franco MD at [address] [place] [address] Pkwy Left 11/10/2017    OPERATIVE FIXATION OF LEFT RING FINGER PROXIMAL PHALANX AND LEFT SMALL FINGER MIDDLE PHALANX FRACTURES. INCLUDING OPEN REDUCTION INTERNAL FIXATION    LAPAROSCOPIC APPENDECTOMY      TURP N/A 2019    CYSTOSCOPY TRANSURETHRAL RESECTION PROSTATE performed by Silvana Klein DO at [address] N/A 10/9/2020    EGD DIAGNOSTIC ONLY performed by Stephen Franco MD at [facility] [address] ENDOSCOPY       Social History:    Social History     Tobacco Use    Smoking status: Former Smoker     Packs/day: 0.25     Years: 10.00     Pack years: 2.50     Types: Cigarettes     Last attempt to quit: 2019     Years since quittin.1    Smokeless tobacco: Never Used   Substance Use Topics    Alcohol use:  Yes     Alcohol/week: 0.0 standard drinks     Comment: occasionally                                Counseling given: Not Answered      Vital Signs (Current):   Vitals:    10/11/20 2350 10/12/20 0450 10/12/20 0813 10/12/20 1141   BP: 107/65 110/67 115/67 128/72   Pulse: 70 70 60 60   Resp: 16 14 18 18   Temp: 97.7 °F (36.5 °C) 97.5 °F (36.4 °C) 98 °F (36.7 °C) 97.8 °F (36.6 °C)   TempSrc: Oral Oral Oral Oral   SpO2: 96% 99% 98% 96%   Weight:       Height:                                                  BP Readings from Last 3 Encounters:   10/12/20 128/72   10/09/20 (!) 85/59   20 128/75       NPO Status: Time of last liquid consumption: 0000                        Time of last solid consumption: 1800                        Date of Choledocholithiasis  S/p ERCP. Endo/Other:    (+) Diabetes, . Abdominal:           Vascular:                                        Anesthesia Plan      general     ASA 2       Induction: intravenous. Anesthetic plan and risks discussed with patient.                       Daniel Moralez MD   10/12/2020

## 2020-10-12 NOTE — PROGRESS NOTES
Surgery Daily Progress Note  Celine Double    CC: epigastric abdominal pain     Subjective :  Patient resting well overnight. States that abdominal pain is improved. Tolerating a low fat diet with no nausea or vomiting. ERCP completed 10/9 with sphincterotomy and stone extraction. Patient planned for Robotic/Laparoscopic cholecystectomy, possible open with IOC on Monday. Objective    Infusions:   lactated ringers 125 mL/hr at 10/11/20 2342    dextrose          I/O:I/O last 3 completed shifts: In: 5139 [P.O.:960; I.V.:10; IV Piggyback:100]  Out: 1350 [Urine:1350]           Wt Readings from Last 1 Encounters:   10/08/20 178 lb (80.7 kg)                 LABS:   Recent Labs     10/11/20  0459 10/12/20  0525   WBC 5.8 6.6   HGB 12.8* 13.1*   HCT 38.6* 39.3*   MCV 91.7 91.6    238        Recent Labs     10/10/20  0448 10/11/20  0459    137   K 3.7 3.6    103   CO2 25 24   PHOS 3.5 3.4   BUN 12 13   CREATININE 1.0 1.1        Recent Labs     10/10/20  0448 10/11/20  0459   AST 90* 42*   * 172*   BILIDIR 0.6* 0.5*   BILITOT 1.6* 1.4*   ALKPHOS 447* 454*        No results for input(s): LIPASE, AMYLASE in the last 72 hours. Recent Labs     10/10/20  0448 10/11/20  0459 10/11/20  0952   PROT 5.3* 5.7*  --    INR  --   --  0.97        No results for input(s): CKTOTAL, CKMB, CKMBINDEX, TROPONINI in the last 72 hours. Exam:  /67   Pulse 70   Temp 97.5 °F (36.4 °C) (Oral)   Resp 14   Ht 6' (1.829 m)   Wt 178 lb (80.7 kg)   SpO2 99%   BMI 24.14 kg/m²     CONSTITUTIONAL:  awake, alert & oriented x 3, cooperative, no apparent distress, and appears stated age  HEENT: No palpable lymphadenopathy, no icterus  LUNGS: No increased work of breathing, clear to auscultation bilaterally, no crackles or wheezing  CV:  Regular rate and rhythm, normal S1 and S2, and no murmur noted  ABDOMEN: Soft,  Non tender , non-distended.     ASSESSMENT/PLAN:   Pt. is a 56 y.o. male with choledocholithiasis s/p ERCP 10/9 with sphincterotomy and stone extraction     · Will discuss MRI being obtained this morning prior to surgery  · Cardiology recommending ECHO, however previously stated no cardiac workup needed will discuss with their team.  · Plan for OR today - Robotic laparoscopic assisted cholecystectomy with intraoperative cholangiogram.  · Patient to be kept NPO prior to surgery  · Patient needs improved glucose control- internal medicine placed on 5 units NPH and 5 units of Lantus qPM, with medium dose sliding scale insulin. · Continue Zosyn until OR   · General surgery team to follow.       Dayanna SchaefferBarney, [place]  6:24 AM  10/12/2020   [phone]

## 2020-10-12 NOTE — CARE COORDINATION
CM following plan lap lynda today no needs at DC anticipated after OR.   Electronically signed by Saritha Lin RN on 10/12/2020 at 3:16 PM  [phone]

## 2020-10-12 NOTE — PLAN OF CARE
Problem: Pain:  Goal: Control of acute pain  Description: Control of acute pain  Outcome: Ongoing   Pt has denied having any pain overnight. Slept well. Problem: Falls - Risk of:  Goal: Will remain free from falls  Description: Will remain free from falls  Outcome: Ongoing   Pt is a low fall risk. Pt ambulates independently with steady gait. Remains free from falls.

## 2020-10-12 NOTE — PROGRESS NOTES
Progress Note    Admit Date: 10/8/2020    Diet: DIET GENERAL; Carb Control: 3 carb choices (45 gms)/meal  Diet NPO, After Midnight    CC: Abdominal pain, nausea, vomiting    Interval history: Denies any complaints at this time. Tolerating diet well. No acute overnight events.  -Echo and MRI abdomen today  -Lap lynda with IOC tomorrow      Medications:     Scheduled Meds:   magnesium sulfate  2 g Intravenous Once    insulin lispro  4 Units Subcutaneous TID WC    insulin lispro  0-6 Units Subcutaneous TID WC    insulin lispro  0-3 Units Subcutaneous Nightly    insulin glargine  5 Units Subcutaneous Nightly    sodium chloride flush  10 mL Intravenous 2 times per day    enoxaparin  40 mg Subcutaneous Daily    influenza virus vaccine  0.5 mL Intramuscular Once    piperacillin-tazobactam  3.375 g Intravenous Q8H     Continuous Infusions:   lactated ringers 125 mL/hr at 10/11/20 2342    dextrose       PRN Meds:sodium chloride flush, polyethylene glycol, promethazine **OR** ondansetron, glucose, dextrose, glucagon (rDNA), dextrose, dextrose    Objective:   Vitals:   T-max:  Patient Vitals for the past 8 hrs:   BP Temp Temp src Pulse Resp SpO2   10/12/20 0813 115/67 98 °F (36.7 °C) Oral 60 18 98 %   10/12/20 0450 110/67 97.5 °F (36.4 °C) Oral 70 14 99 %       Intake/Output Summary (Last 24 hours) at 10/12/2020 0855  Last data filed at 10/12/2020 0813  Gross per 24 hour   Intake 1195 ml   Output 1125 ml   Net 70 ml     Review of Systems   Constitutional: Negative. HENT: Negative. Respiratory: Negative. Cardiovascular: Negative. Gastrointestinal: Negative. Genitourinary: Negative. Musculoskeletal: Negative. Skin: Negative. Neurological: Negative. Psychiatric/Behavioral: Negative.          Physical Exam  Constitutional:       General: He is not in acute distress. Appearance: Normal appearance. Cardiovascular:      Rate and Rhythm: Normal rate and regular rhythm.       Pulses: Normal pulses. Heart sounds: Normal heart sounds. Pulmonary:      Effort: Pulmonary effort is normal.      Breath sounds: Normal breath sounds. Abdominal:      General: Abdomen is flat. Bowel sounds are normal.      Palpations: Abdomen is soft. Musculoskeletal: Normal range of motion. Skin:     General: Skin is warm and dry. Capillary Refill: Capillary refill takes less than 2 seconds. Neurological:      General: No focal deficit present. Mental Status: He is alert. Psychiatric:         Mood and Affect: Mood normal.      LABS:    CBC:   Recent Labs     10/10/20  0448 10/11/20  0459 10/12/20  0525   WBC 5.8 5.8 6.6   HGB 12.4* 12.8* 13.1*   HCT 36.2* 38.6* 39.3*    243 238   MCV 89.9 91.7 91.6     Renal:    Recent Labs     10/10/20  0448 10/11/20  0459 10/12/20  0525    137 139   K 3.7 3.6 4.1    103 105   CO2 25 24 23   BUN 12 13 13   CREATININE 1.0 1.1 1.2   GLUCOSE 245* 150* 262*   CALCIUM 8.5 8.9 9.2   MG 1.80 2.00 1.90   PHOS 3.5 3.4 3.2   ANIONGAP 10 10 11     Hepatic:   Recent Labs     10/10/20  0448 10/11/20  0459 10/12/20  0525   AST 90* 42* 31   * 172* 134*   BILITOT 1.6* 1.4* 1.1*   BILIDIR 0.6* 0.5* 0.3   PROT 5.3* 5.7* 5.9*   LABALBU 3.2* 3.2* 3.5   ALKPHOS 447* 454* 429*     Troponin: No results for input(s): TROPONINI in the last 72 hours. BNP: No results for input(s): BNP in the last 72 hours. Lipids: No results for input(s): CHOL, HDL in the last 72 hours. Invalid input(s): LDLCALCU, TRIGLYCERIDE  ABGs:  No results for input(s): PHART, CJW8BYR, PO2ART, MWH9IEY, BEART, THGBART, A5SFYDCH, HGT7RPA in the last 72 hours. INR:   Recent Labs     10/11/20  0952   INR 0.97     Lactate: No results for input(s): LACTATE in the last 72 hours. Cultures:  -----------------------------------------------------------------  RAD:   XR CHEST PORTABLE   Final Result      Bibasilar minimal linear atelectasis.       FL ERCP BILIARY AND PANCREATIC S&I   Final Result Intraoperative fluoroscopy was performed. Correlate with procedural note for additional information. US ABDOMEN LIMITED   Final Result      Nondiagnostic evaluation of the gallbladder which is obscured. No acute sonographic abnormality of the abdomen. .             CT ABDOMEN PELVIS WO CONTRAST Additional Contrast? None   Final Result      No acute abdominopelvic abnormality. Contracted gallbladder with questionable wall thickening and small nonspecific densities along the fundus. There also may be gallstones. As described on prior report, follow-up MRI/MRCP should be considered. No definite CT evidence of acute    cholecystitis. Prostatomegaly with circumferential bladder wall thickening, which may be secondary to bladder outlet obstruction versus cystitis. MRI ABDOMEN W WO CONTRAST MRCP    (Results Pending)       Assessment/Plan:   Choledocholithiasis  - , , Alk Phos 604 at admission. - CT-AP (10/08): Contracted gallbladder, possible wall thickening. Possible gallstones. - RUQ-US (10/09): No intrahepatic biliary duct dilation. CBD normal in diameter. Sonographic Layton's sign negative. - ERCP (10/09): CBD filling defect c/w choledocholithiasis. Mild CBD dilation.   - Plan: Zosyn 3.375 mg QD. Cholecystectomy/cholangiogram on 10/12.      T2DM  - Had taken metformin prior to admission. -.Add 5 units lantus qPM. LDSSI. Added 4 units with meals.   F/U repeat A1c.      DVT PPX- Lovenox 40 mg QD.   Diet- Low fat / low carb.   Disposition- D/C to home after cholecystectomy.  Ovidio Julien Status: Full     Barrie Mir MD, PGY-1  10/12/20  8:55 AM    This patient has been staffed and discussed with Luis Eason MD.

## 2020-10-13 ENCOUNTER — ANESTHESIA (OUTPATIENT)
Dept: OPERATING ROOM | Age: 60
DRG: 417 | End: 2020-10-13
Payer: COMMERCIAL

## 2020-10-13 VITALS — TEMPERATURE: 83.5 F | OXYGEN SATURATION: 100 % | DIASTOLIC BLOOD PRESSURE: 53 MMHG | SYSTOLIC BLOOD PRESSURE: 100 MMHG

## 2020-10-13 LAB
ALBUMIN SERPL-MCNC: 3.7 G/DL (ref 3.4–5)
ALP BLD-CCNC: 396 U/L (ref 40–129)
ALT SERPL-CCNC: 105 U/L (ref 10–40)
ANION GAP SERPL CALCULATED.3IONS-SCNC: 10 MMOL/L (ref 3–16)
AST SERPL-CCNC: 39 U/L (ref 15–37)
BASOPHILS ABSOLUTE: 0.1 K/UL (ref 0–0.2)
BASOPHILS RELATIVE PERCENT: 0.8 %
BILIRUB SERPL-MCNC: 1.1 MG/DL (ref 0–1)
BILIRUBIN DIRECT: 0.3 MG/DL (ref 0–0.3)
BILIRUBIN, INDIRECT: 0.8 MG/DL (ref 0–1)
BUN BLDV-MCNC: 13 MG/DL (ref 7–20)
CALCIUM SERPL-MCNC: 9.1 MG/DL (ref 8.3–10.6)
CHLORIDE BLD-SCNC: 105 MMOL/L (ref 99–110)
CO2: 23 MMOL/L (ref 21–32)
CREAT SERPL-MCNC: 1.3 MG/DL (ref 0.8–1.3)
EOSINOPHILS ABSOLUTE: 0.3 K/UL (ref 0–0.6)
EOSINOPHILS RELATIVE PERCENT: 4.3 %
GFR AFRICAN AMERICAN: >60
GFR NON-AFRICAN AMERICAN: 56
GLUCOSE BLD-MCNC: 119 MG/DL (ref 70–99)
GLUCOSE BLD-MCNC: 125 MG/DL (ref 70–99)
GLUCOSE BLD-MCNC: 206 MG/DL (ref 70–99)
GLUCOSE BLD-MCNC: 221 MG/DL (ref 70–99)
GLUCOSE BLD-MCNC: 95 MG/DL (ref 70–99)
HCT VFR BLD CALC: 38.7 % (ref 40.5–52.5)
HEMOGLOBIN: 12.7 G/DL (ref 13.5–17.5)
LYMPHOCYTES ABSOLUTE: 2.8 K/UL (ref 1–5.1)
LYMPHOCYTES RELATIVE PERCENT: 36.3 %
MAGNESIUM: 2 MG/DL (ref 1.8–2.4)
MCH RBC QN AUTO: 30.4 PG (ref 26–34)
MCHC RBC AUTO-ENTMCNC: 32.9 G/DL (ref 31–36)
MCV RBC AUTO: 92.3 FL (ref 80–100)
MONOCYTES ABSOLUTE: 0.6 K/UL (ref 0–1.3)
MONOCYTES RELATIVE PERCENT: 7.7 %
NEUTROPHILS ABSOLUTE: 3.9 K/UL (ref 1.7–7.7)
NEUTROPHILS RELATIVE PERCENT: 50.9 %
PDW BLD-RTO: 12.7 % (ref 12.4–15.4)
PERFORMED ON: ABNORMAL
PERFORMED ON: NORMAL
PHOSPHORUS: 3.6 MG/DL (ref 2.5–4.9)
PLATELET # BLD: 256 K/UL (ref 135–450)
PMV BLD AUTO: 8.3 FL (ref 5–10.5)
POTASSIUM SERPL-SCNC: 4.2 MMOL/L (ref 3.5–5.1)
RBC # BLD: 4.19 M/UL (ref 4.2–5.9)
SODIUM BLD-SCNC: 138 MMOL/L (ref 136–145)
TOTAL PROTEIN: 5.9 G/DL (ref 6.4–8.2)
WBC # BLD: 7.7 K/UL (ref 4–11)

## 2020-10-13 PROCEDURE — 0F9040Z DRAINAGE OF LIVER WITH DRAINAGE DEVICE, PERCUTANEOUS ENDOSCOPIC APPROACH: ICD-10-PCS | Performed by: SURGERY

## 2020-10-13 PROCEDURE — S2900 ROBOTIC SURGICAL SYSTEM: HCPCS | Performed by: SURGERY

## 2020-10-13 PROCEDURE — 47562 LAPAROSCOPIC CHOLECYSTECTOMY: CPT | Performed by: SURGERY

## 2020-10-13 PROCEDURE — 43235 EGD DIAGNOSTIC BRUSH WASH: CPT | Performed by: SURGERY

## 2020-10-13 PROCEDURE — 47379 UNLISTED LAPS PX LIVER: CPT | Performed by: SURGERY

## 2020-10-13 PROCEDURE — 0FT44ZZ RESECTION OF GALLBLADDER, PERCUTANEOUS ENDOSCOPIC APPROACH: ICD-10-PCS | Performed by: SURGERY

## 2020-10-13 PROCEDURE — 0WUF47Z SUPPLEMENT ABDOMINAL WALL WITH AUTOLOGOUS TISSUE SUBSTITUTE, PERCUTANEOUS ENDOSCOPIC APPROACH: ICD-10-PCS | Performed by: SURGERY

## 2020-10-13 PROCEDURE — C1758 CATHETER, URETERAL: HCPCS | Performed by: SURGERY

## 2020-10-13 PROCEDURE — 6360000002 HC RX W HCPCS: Performed by: STUDENT IN AN ORGANIZED HEALTH CARE EDUCATION/TRAINING PROGRAM

## 2020-10-13 PROCEDURE — 2580000003 HC RX 258: Performed by: STUDENT IN AN ORGANIZED HEALTH CARE EDUCATION/TRAINING PROGRAM

## 2020-10-13 PROCEDURE — 85025 COMPLETE CBC W/AUTO DIFF WBC: CPT

## 2020-10-13 PROCEDURE — 2580000003 HC RX 258: Performed by: NURSE ANESTHETIST, CERTIFIED REGISTERED

## 2020-10-13 PROCEDURE — 8E0W4CZ ROBOTIC ASSISTED PROCEDURE OF TRUNK REGION, PERCUTANEOUS ENDOSCOPIC APPROACH: ICD-10-PCS | Performed by: SURGERY

## 2020-10-13 PROCEDURE — 6360000002 HC RX W HCPCS: Performed by: ANESTHESIOLOGY

## 2020-10-13 PROCEDURE — 7100000001 HC PACU RECOVERY - ADDTL 15 MIN: Performed by: SURGERY

## 2020-10-13 PROCEDURE — 80069 RENAL FUNCTION PANEL: CPT

## 2020-10-13 PROCEDURE — 2720000010 HC SURG SUPPLY STERILE: Performed by: SURGERY

## 2020-10-13 PROCEDURE — 6360000002 HC RX W HCPCS: Performed by: NURSE ANESTHETIST, CERTIFIED REGISTERED

## 2020-10-13 PROCEDURE — 0DNU4ZZ RELEASE OMENTUM, PERCUTANEOUS ENDOSCOPIC APPROACH: ICD-10-PCS | Performed by: SURGERY

## 2020-10-13 PROCEDURE — 43840 GSTRRPHY SUTR DUOL/GSTR ULCR: CPT | Performed by: SURGERY

## 2020-10-13 PROCEDURE — BF121ZZ FLUOROSCOPY OF GALLBLADDER USING LOW OSMOLAR CONTRAST: ICD-10-PCS | Performed by: SURGERY

## 2020-10-13 PROCEDURE — 6360000002 HC RX W HCPCS: Performed by: INTERNAL MEDICINE

## 2020-10-13 PROCEDURE — 2500000003 HC RX 250 WO HCPCS: Performed by: NURSE ANESTHETIST, CERTIFIED REGISTERED

## 2020-10-13 PROCEDURE — 2709999900 HC NON-CHARGEABLE SUPPLY: Performed by: SURGERY

## 2020-10-13 PROCEDURE — 3700000001 HC ADD 15 MINUTES (ANESTHESIA): Performed by: SURGERY

## 2020-10-13 PROCEDURE — 44110 EXCISE INTESTINE LESION(S): CPT | Performed by: SURGERY

## 2020-10-13 PROCEDURE — 2580000003 HC RX 258: Performed by: INTERNAL MEDICINE

## 2020-10-13 PROCEDURE — 88305 TISSUE EXAM BY PATHOLOGIST: CPT

## 2020-10-13 PROCEDURE — 0DNW4ZZ RELEASE PERITONEUM, PERCUTANEOUS ENDOSCOPIC APPROACH: ICD-10-PCS | Performed by: SURGERY

## 2020-10-13 PROCEDURE — 80076 HEPATIC FUNCTION PANEL: CPT

## 2020-10-13 PROCEDURE — 3600000019 HC SURGERY ROBOT ADDTL 15MIN: Performed by: SURGERY

## 2020-10-13 PROCEDURE — 3600000009 HC SURGERY ROBOT BASE: Performed by: SURGERY

## 2020-10-13 PROCEDURE — 0DBW4ZZ EXCISION OF PERITONEUM, PERCUTANEOUS ENDOSCOPIC APPROACH: ICD-10-PCS | Performed by: SURGERY

## 2020-10-13 PROCEDURE — 99232 SBSQ HOSP IP/OBS MODERATE 35: CPT | Performed by: NURSE PRACTITIONER

## 2020-10-13 PROCEDURE — 88304 TISSUE EXAM BY PATHOLOGIST: CPT

## 2020-10-13 PROCEDURE — 7100000000 HC PACU RECOVERY - FIRST 15 MIN: Performed by: SURGERY

## 2020-10-13 PROCEDURE — 3700000000 HC ANESTHESIA ATTENDED CARE: Performed by: SURGERY

## 2020-10-13 PROCEDURE — 83735 ASSAY OF MAGNESIUM: CPT

## 2020-10-13 PROCEDURE — 1200000000 HC SEMI PRIVATE

## 2020-10-13 PROCEDURE — 2580000003 HC RX 258: Performed by: SURGERY

## 2020-10-13 PROCEDURE — 2500000003 HC RX 250 WO HCPCS: Performed by: SURGERY

## 2020-10-13 PROCEDURE — 99232 SBSQ HOSP IP/OBS MODERATE 35: CPT | Performed by: SURGERY

## 2020-10-13 PROCEDURE — 0DQ94ZZ REPAIR DUODENUM, PERCUTANEOUS ENDOSCOPIC APPROACH: ICD-10-PCS | Performed by: SURGERY

## 2020-10-13 PROCEDURE — 36415 COLL VENOUS BLD VENIPUNCTURE: CPT

## 2020-10-13 PROCEDURE — 49905 OMENTAL FLAP INTRA-ABDOM: CPT | Performed by: SURGERY

## 2020-10-13 RX ORDER — LIDOCAINE HYDROCHLORIDE 20 MG/ML
INJECTION, SOLUTION INTRAVENOUS PRN
Status: DISCONTINUED | OUTPATIENT
Start: 2020-10-13 | End: 2020-10-13 | Stop reason: SDUPTHER

## 2020-10-13 RX ORDER — PROPOFOL 10 MG/ML
INJECTION, EMULSION INTRAVENOUS PRN
Status: DISCONTINUED | OUTPATIENT
Start: 2020-10-13 | End: 2020-10-13 | Stop reason: SDUPTHER

## 2020-10-13 RX ORDER — SODIUM CHLORIDE, SODIUM LACTATE, POTASSIUM CHLORIDE, CALCIUM CHLORIDE 600; 310; 30; 20 MG/100ML; MG/100ML; MG/100ML; MG/100ML
INJECTION, SOLUTION INTRAVENOUS CONTINUOUS
Status: DISCONTINUED | OUTPATIENT
Start: 2020-10-13 | End: 2020-10-16

## 2020-10-13 RX ORDER — GLYCOPYRROLATE 1 MG/5 ML
SYRINGE (ML) INTRAVENOUS PRN
Status: DISCONTINUED | OUTPATIENT
Start: 2020-10-13 | End: 2020-10-13 | Stop reason: SDUPTHER

## 2020-10-13 RX ORDER — BUPIVACAINE HYDROCHLORIDE AND EPINEPHRINE 5; 5 MG/ML; UG/ML
INJECTION, SOLUTION EPIDURAL; INTRACAUDAL; PERINEURAL PRN
Status: DISCONTINUED | OUTPATIENT
Start: 2020-10-13 | End: 2020-10-13 | Stop reason: HOSPADM

## 2020-10-13 RX ORDER — NEOSTIGMINE METHYLSULFATE 5 MG/5 ML
SYRINGE (ML) INTRAVENOUS PRN
Status: DISCONTINUED | OUTPATIENT
Start: 2020-10-13 | End: 2020-10-13 | Stop reason: SDUPTHER

## 2020-10-13 RX ORDER — SODIUM CHLORIDE, SODIUM LACTATE, POTASSIUM CHLORIDE, CALCIUM CHLORIDE 600; 310; 30; 20 MG/100ML; MG/100ML; MG/100ML; MG/100ML
INJECTION, SOLUTION INTRAVENOUS CONTINUOUS PRN
Status: DISCONTINUED | OUTPATIENT
Start: 2020-10-13 | End: 2020-10-13 | Stop reason: SDUPTHER

## 2020-10-13 RX ORDER — HYDROMORPHONE HCL 110MG/55ML
PATIENT CONTROLLED ANALGESIA SYRINGE INTRAVENOUS PRN
Status: DISCONTINUED | OUTPATIENT
Start: 2020-10-13 | End: 2020-10-13 | Stop reason: SDUPTHER

## 2020-10-13 RX ORDER — INDOCYANINE GREEN AND WATER 25 MG
KIT INJECTION PRN
Status: DISCONTINUED | OUTPATIENT
Start: 2020-10-13 | End: 2020-10-13 | Stop reason: HOSPADM

## 2020-10-13 RX ORDER — ROCURONIUM BROMIDE 10 MG/ML
INJECTION, SOLUTION INTRAVENOUS PRN
Status: DISCONTINUED | OUTPATIENT
Start: 2020-10-13 | End: 2020-10-13 | Stop reason: SDUPTHER

## 2020-10-13 RX ORDER — FENTANYL CITRATE 50 UG/ML
INJECTION, SOLUTION INTRAMUSCULAR; INTRAVENOUS PRN
Status: DISCONTINUED | OUTPATIENT
Start: 2020-10-13 | End: 2020-10-13 | Stop reason: SDUPTHER

## 2020-10-13 RX ORDER — MAGNESIUM HYDROXIDE 1200 MG/15ML
LIQUID ORAL CONTINUOUS PRN
Status: COMPLETED | OUTPATIENT
Start: 2020-10-13 | End: 2020-10-13

## 2020-10-13 RX ORDER — PHENYLEPHRINE HYDROCHLORIDE 10 MG/ML
INJECTION INTRAVENOUS PRN
Status: DISCONTINUED | OUTPATIENT
Start: 2020-10-13 | End: 2020-10-13 | Stop reason: SDUPTHER

## 2020-10-13 RX ORDER — ONDANSETRON 2 MG/ML
INJECTION INTRAMUSCULAR; INTRAVENOUS PRN
Status: DISCONTINUED | OUTPATIENT
Start: 2020-10-13 | End: 2020-10-13 | Stop reason: SDUPTHER

## 2020-10-13 RX ORDER — ONDANSETRON 2 MG/ML
4 INJECTION INTRAMUSCULAR; INTRAVENOUS ONCE
Status: COMPLETED | OUTPATIENT
Start: 2020-10-13 | End: 2020-10-13

## 2020-10-13 RX ADMIN — PHENYLEPHRINE HYDROCHLORIDE 100 MCG: 10 INJECTION INTRAVENOUS at 12:23

## 2020-10-13 RX ADMIN — HYDROMORPHONE HYDROCHLORIDE 0.5 MG: 2 INJECTION, SOLUTION INTRAMUSCULAR; INTRAVENOUS; SUBCUTANEOUS at 11:02

## 2020-10-13 RX ADMIN — PIPERACILLIN AND TAZOBACTAM 3.38 G: 3; .375 INJECTION, POWDER, LYOPHILIZED, FOR SOLUTION INTRAVENOUS at 17:13

## 2020-10-13 RX ADMIN — Medication 5 MG: at 13:46

## 2020-10-13 RX ADMIN — HYDROMORPHONE HYDROCHLORIDE 0.5 MG: 2 INJECTION, SOLUTION INTRAMUSCULAR; INTRAVENOUS; SUBCUTANEOUS at 13:03

## 2020-10-13 RX ADMIN — PIPERACILLIN AND TAZOBACTAM 3.38 G: 3; .375 INJECTION, POWDER, LYOPHILIZED, FOR SOLUTION INTRAVENOUS at 03:05

## 2020-10-13 RX ADMIN — PHENYLEPHRINE HYDROCHLORIDE 80 MCG: 10 INJECTION INTRAVENOUS at 13:35

## 2020-10-13 RX ADMIN — ROCURONIUM BROMIDE 20 MG: 10 INJECTION INTRAVENOUS at 12:26

## 2020-10-13 RX ADMIN — SODIUM CHLORIDE, SODIUM LACTATE, POTASSIUM CHLORIDE, AND CALCIUM CHLORIDE: 600; 310; 30; 20 INJECTION, SOLUTION INTRAVENOUS at 09:44

## 2020-10-13 RX ADMIN — INSULIN LISPRO 4 UNITS: 100 INJECTION, SOLUTION INTRAVENOUS; SUBCUTANEOUS at 17:12

## 2020-10-13 RX ADMIN — PHENYLEPHRINE HYDROCHLORIDE 80 MCG: 10 INJECTION INTRAVENOUS at 11:28

## 2020-10-13 RX ADMIN — PHENYLEPHRINE HYDROCHLORIDE 80 MCG: 10 INJECTION INTRAVENOUS at 11:59

## 2020-10-13 RX ADMIN — PHENYLEPHRINE HYDROCHLORIDE 80 MCG: 10 INJECTION INTRAVENOUS at 11:14

## 2020-10-13 RX ADMIN — INSULIN GLARGINE 5 UNITS: 100 INJECTION, SOLUTION SUBCUTANEOUS at 21:11

## 2020-10-13 RX ADMIN — ROCURONIUM BROMIDE 100 MG: 10 INJECTION INTRAVENOUS at 09:34

## 2020-10-13 RX ADMIN — PHENYLEPHRINE HYDROCHLORIDE 80 MCG: 10 INJECTION INTRAVENOUS at 13:23

## 2020-10-13 RX ADMIN — FENTANYL CITRATE 50 MCG: 50 INJECTION INTRAMUSCULAR; INTRAVENOUS at 09:47

## 2020-10-13 RX ADMIN — PHENYLEPHRINE HYDROCHLORIDE 160 MCG: 10 INJECTION INTRAVENOUS at 13:39

## 2020-10-13 RX ADMIN — PHENYLEPHRINE HYDROCHLORIDE 80 MCG: 10 INJECTION INTRAVENOUS at 13:17

## 2020-10-13 RX ADMIN — Medication 0.8 MG: at 13:46

## 2020-10-13 RX ADMIN — PHENYLEPHRINE HYDROCHLORIDE 80 MCG: 10 INJECTION INTRAVENOUS at 09:56

## 2020-10-13 RX ADMIN — PROPOFOL 150 MG: 10 INJECTION, EMULSION INTRAVENOUS at 09:34

## 2020-10-13 RX ADMIN — PHENYLEPHRINE HYDROCHLORIDE 80 MCG: 10 INJECTION INTRAVENOUS at 14:07

## 2020-10-13 RX ADMIN — Medication 10 ML: at 23:51

## 2020-10-13 RX ADMIN — SODIUM CHLORIDE, POTASSIUM CHLORIDE, SODIUM LACTATE AND CALCIUM CHLORIDE: 600; 310; 30; 20 INJECTION, SOLUTION INTRAVENOUS at 18:39

## 2020-10-13 RX ADMIN — LIDOCAINE HYDROCHLORIDE 100 MG: 20 INJECTION, SOLUTION INTRAVENOUS at 09:34

## 2020-10-13 RX ADMIN — HYDROMORPHONE HYDROCHLORIDE 0.5 MG: 1 INJECTION, SOLUTION INTRAMUSCULAR; INTRAVENOUS; SUBCUTANEOUS at 20:44

## 2020-10-13 RX ADMIN — FAMOTIDINE 20 MG: 10 INJECTION, SOLUTION INTRAVENOUS at 09:54

## 2020-10-13 RX ADMIN — ONDANSETRON 4 MG: 2 INJECTION INTRAMUSCULAR; INTRAVENOUS at 09:54

## 2020-10-13 RX ADMIN — ONDANSETRON 4 MG: 2 INJECTION INTRAMUSCULAR; INTRAVENOUS at 15:31

## 2020-10-13 RX ADMIN — SODIUM CHLORIDE, SODIUM LACTATE, POTASSIUM CHLORIDE, AND CALCIUM CHLORIDE: 600; 310; 30; 20 INJECTION, SOLUTION INTRAVENOUS at 09:34

## 2020-10-13 RX ADMIN — HYDROMORPHONE HYDROCHLORIDE 0.5 MG: 2 INJECTION, SOLUTION INTRAMUSCULAR; INTRAVENOUS; SUBCUTANEOUS at 13:59

## 2020-10-13 RX ADMIN — PHENYLEPHRINE HYDROCHLORIDE 80 MCG: 10 INJECTION INTRAVENOUS at 13:26

## 2020-10-13 RX ADMIN — INSULIN LISPRO 4 UNITS: 100 INJECTION, SOLUTION INTRAVENOUS; SUBCUTANEOUS at 14:44

## 2020-10-13 RX ADMIN — PHENYLEPHRINE HYDROCHLORIDE 160 MCG: 10 INJECTION INTRAVENOUS at 13:50

## 2020-10-13 RX ADMIN — FENTANYL CITRATE 50 MCG: 50 INJECTION INTRAMUSCULAR; INTRAVENOUS at 09:34

## 2020-10-13 ASSESSMENT — PULMONARY FUNCTION TESTS
PIF_VALUE: 21
PIF_VALUE: 7
PIF_VALUE: 21
PIF_VALUE: 23
PIF_VALUE: 20
PIF_VALUE: 13
PIF_VALUE: 22
PIF_VALUE: 21
PIF_VALUE: 22
PIF_VALUE: 0
PIF_VALUE: 19
PIF_VALUE: 0
PIF_VALUE: 22
PIF_VALUE: 13
PIF_VALUE: 21
PIF_VALUE: 22
PIF_VALUE: 19
PIF_VALUE: 13
PIF_VALUE: 22
PIF_VALUE: 0
PIF_VALUE: 22
PIF_VALUE: 20
PIF_VALUE: 13
PIF_VALUE: 22
PIF_VALUE: 20
PIF_VALUE: 0
PIF_VALUE: 0
PIF_VALUE: 22
PIF_VALUE: 22
PIF_VALUE: 21
PIF_VALUE: 22
PIF_VALUE: 20
PIF_VALUE: 6
PIF_VALUE: 21
PIF_VALUE: 3
PIF_VALUE: 0
PIF_VALUE: 0
PIF_VALUE: 23
PIF_VALUE: 21
PIF_VALUE: 21
PIF_VALUE: 0
PIF_VALUE: 21
PIF_VALUE: 0
PIF_VALUE: 0
PIF_VALUE: 13
PIF_VALUE: 24
PIF_VALUE: 20
PIF_VALUE: 22
PIF_VALUE: 21
PIF_VALUE: 6
PIF_VALUE: 0
PIF_VALUE: 6
PIF_VALUE: 0
PIF_VALUE: 0
PIF_VALUE: 21
PIF_VALUE: 22
PIF_VALUE: 0
PIF_VALUE: 21
PIF_VALUE: 0
PIF_VALUE: 23
PIF_VALUE: 22
PIF_VALUE: 23
PIF_VALUE: 0
PIF_VALUE: 15
PIF_VALUE: 21
PIF_VALUE: 0
PIF_VALUE: 22
PIF_VALUE: 21
PIF_VALUE: 0
PIF_VALUE: 23
PIF_VALUE: 19
PIF_VALUE: 0
PIF_VALUE: 21
PIF_VALUE: 22
PIF_VALUE: 13
PIF_VALUE: 21
PIF_VALUE: 6
PIF_VALUE: 21
PIF_VALUE: 0
PIF_VALUE: 13
PIF_VALUE: 0
PIF_VALUE: 22
PIF_VALUE: 22
PIF_VALUE: 0
PIF_VALUE: 21
PIF_VALUE: 21
PIF_VALUE: 15
PIF_VALUE: 0
PIF_VALUE: 22
PIF_VALUE: 21
PIF_VALUE: 0
PIF_VALUE: 21
PIF_VALUE: 22
PIF_VALUE: 21
PIF_VALUE: 0
PIF_VALUE: 0
PIF_VALUE: 22
PIF_VALUE: 0
PIF_VALUE: 22
PIF_VALUE: 0
PIF_VALUE: 22
PIF_VALUE: 0
PIF_VALUE: 22
PIF_VALUE: 21
PIF_VALUE: 22
PIF_VALUE: 0
PIF_VALUE: 21
PIF_VALUE: 18
PIF_VALUE: 0
PIF_VALUE: 0
PIF_VALUE: 21
PIF_VALUE: 13
PIF_VALUE: 0
PIF_VALUE: 0
PIF_VALUE: 24
PIF_VALUE: 22
PIF_VALUE: 0
PIF_VALUE: 0
PIF_VALUE: 22
PIF_VALUE: 19
PIF_VALUE: 19
PIF_VALUE: 21
PIF_VALUE: 23
PIF_VALUE: 19
PIF_VALUE: 23
PIF_VALUE: 19
PIF_VALUE: 20
PIF_VALUE: 21
PIF_VALUE: 22
PIF_VALUE: 23
PIF_VALUE: 1
PIF_VALUE: 13
PIF_VALUE: 22
PIF_VALUE: 0
PIF_VALUE: 20
PIF_VALUE: 21
PIF_VALUE: 0
PIF_VALUE: 20
PIF_VALUE: 0
PIF_VALUE: 13
PIF_VALUE: 9
PIF_VALUE: 0
PIF_VALUE: 0
PIF_VALUE: 19
PIF_VALUE: 15
PIF_VALUE: 18
PIF_VALUE: 0
PIF_VALUE: 0
PIF_VALUE: 22
PIF_VALUE: 22
PIF_VALUE: 20
PIF_VALUE: 21
PIF_VALUE: 0
PIF_VALUE: 21
PIF_VALUE: 7
PIF_VALUE: 0
PIF_VALUE: 22
PIF_VALUE: 20
PIF_VALUE: 21
PIF_VALUE: 20
PIF_VALUE: 21
PIF_VALUE: 0
PIF_VALUE: 21
PIF_VALUE: 22
PIF_VALUE: 21
PIF_VALUE: 13
PIF_VALUE: 21
PIF_VALUE: 0
PIF_VALUE: 0
PIF_VALUE: 22
PIF_VALUE: 13
PIF_VALUE: 0
PIF_VALUE: 21
PIF_VALUE: 0
PIF_VALUE: 13
PIF_VALUE: 16
PIF_VALUE: 21
PIF_VALUE: 22
PIF_VALUE: 21
PIF_VALUE: 23
PIF_VALUE: 0
PIF_VALUE: 21
PIF_VALUE: 0
PIF_VALUE: 21
PIF_VALUE: 23
PIF_VALUE: 1
PIF_VALUE: 22
PIF_VALUE: 21
PIF_VALUE: 13
PIF_VALUE: 0
PIF_VALUE: 21
PIF_VALUE: 23
PIF_VALUE: 0
PIF_VALUE: 20
PIF_VALUE: 0
PIF_VALUE: 22
PIF_VALUE: 20
PIF_VALUE: 21
PIF_VALUE: 13
PIF_VALUE: 23
PIF_VALUE: 21
PIF_VALUE: 22
PIF_VALUE: 19
PIF_VALUE: 22
PIF_VALUE: 20
PIF_VALUE: 0
PIF_VALUE: 0
PIF_VALUE: 19
PIF_VALUE: 0
PIF_VALUE: 23
PIF_VALUE: 24
PIF_VALUE: 0
PIF_VALUE: 23
PIF_VALUE: 0
PIF_VALUE: 0
PIF_VALUE: 21
PIF_VALUE: 22
PIF_VALUE: 5
PIF_VALUE: 1
PIF_VALUE: 0
PIF_VALUE: 23
PIF_VALUE: 22
PIF_VALUE: 0
PIF_VALUE: 7
PIF_VALUE: 22
PIF_VALUE: 23
PIF_VALUE: 23
PIF_VALUE: 20
PIF_VALUE: 22
PIF_VALUE: 21
PIF_VALUE: 14
PIF_VALUE: 22
PIF_VALUE: 21
PIF_VALUE: 20
PIF_VALUE: 22
PIF_VALUE: 0
PIF_VALUE: 21
PIF_VALUE: 0
PIF_VALUE: 21
PIF_VALUE: 20
PIF_VALUE: 23
PIF_VALUE: 19
PIF_VALUE: 0
PIF_VALUE: 21
PIF_VALUE: 21
PIF_VALUE: 13
PIF_VALUE: 6
PIF_VALUE: 21
PIF_VALUE: 13
PIF_VALUE: 17
PIF_VALUE: 21
PIF_VALUE: 0
PIF_VALUE: 21
PIF_VALUE: 0
PIF_VALUE: 21
PIF_VALUE: 21
PIF_VALUE: 0
PIF_VALUE: 0
PIF_VALUE: 20
PIF_VALUE: 23
PIF_VALUE: 0
PIF_VALUE: 20
PIF_VALUE: 6
PIF_VALUE: 0
PIF_VALUE: 20
PIF_VALUE: 23
PIF_VALUE: 0
PIF_VALUE: 21
PIF_VALUE: 21
PIF_VALUE: 0
PIF_VALUE: 22
PIF_VALUE: 22
PIF_VALUE: 21
PIF_VALUE: 12
PIF_VALUE: 22
PIF_VALUE: 7
PIF_VALUE: 23
PIF_VALUE: 18
PIF_VALUE: 22
PIF_VALUE: 16
PIF_VALUE: 13
PIF_VALUE: 21
PIF_VALUE: 21
PIF_VALUE: 20
PIF_VALUE: 23
PIF_VALUE: 21
PIF_VALUE: 0
PIF_VALUE: 21
PIF_VALUE: 21
PIF_VALUE: 0
PIF_VALUE: 11
PIF_VALUE: 0
PIF_VALUE: 0
PIF_VALUE: 21
PIF_VALUE: 21
PIF_VALUE: 0
PIF_VALUE: 13
PIF_VALUE: 0
PIF_VALUE: 0
PIF_VALUE: 21
PIF_VALUE: 22
PIF_VALUE: 23
PIF_VALUE: 0
PIF_VALUE: 21
PIF_VALUE: 0
PIF_VALUE: 0
PIF_VALUE: 20
PIF_VALUE: 11
PIF_VALUE: 21
PIF_VALUE: 24
PIF_VALUE: 0
PIF_VALUE: 22
PIF_VALUE: 21
PIF_VALUE: 22
PIF_VALUE: 21
PIF_VALUE: 22
PIF_VALUE: 21
PIF_VALUE: 21
PIF_VALUE: 22
PIF_VALUE: 0
PIF_VALUE: 14
PIF_VALUE: 14
PIF_VALUE: 0
PIF_VALUE: 21
PIF_VALUE: 0
PIF_VALUE: 21
PIF_VALUE: 0
PIF_VALUE: 22
PIF_VALUE: 20
PIF_VALUE: 0
PIF_VALUE: 22
PIF_VALUE: 0
PIF_VALUE: 13
PIF_VALUE: 0
PIF_VALUE: 0
PIF_VALUE: 1
PIF_VALUE: 21
PIF_VALUE: 21
PIF_VALUE: 0
PIF_VALUE: 0
PIF_VALUE: 21
PIF_VALUE: 20
PIF_VALUE: 21
PIF_VALUE: 13
PIF_VALUE: 21
PIF_VALUE: 23
PIF_VALUE: 0
PIF_VALUE: 21
PIF_VALUE: 0
PIF_VALUE: 15
PIF_VALUE: 23
PIF_VALUE: 0
PIF_VALUE: 21
PIF_VALUE: 20
PIF_VALUE: 14
PIF_VALUE: 21
PIF_VALUE: 0
PIF_VALUE: 22
PIF_VALUE: 13

## 2020-10-13 ASSESSMENT — PAIN SCALES - GENERAL
PAINLEVEL_OUTOF10: 10
PAINLEVEL_OUTOF10: 5
PAINLEVEL_OUTOF10: 0
PAINLEVEL_OUTOF10: 8
PAINLEVEL_OUTOF10: 0

## 2020-10-13 ASSESSMENT — PAIN DESCRIPTION - DESCRIPTORS
DESCRIPTORS: DISCOMFORT;SHARP
DESCRIPTORS: SPASM
DESCRIPTORS: SPASM;SHARP

## 2020-10-13 ASSESSMENT — PAIN DESCRIPTION - PROGRESSION
CLINICAL_PROGRESSION: GRADUALLY IMPROVING
CLINICAL_PROGRESSION: GRADUALLY WORSENING
CLINICAL_PROGRESSION: NOT CHANGED

## 2020-10-13 ASSESSMENT — PAIN DESCRIPTION - FREQUENCY
FREQUENCY: CONTINUOUS

## 2020-10-13 ASSESSMENT — PAIN DESCRIPTION - ONSET
ONSET: ON-GOING

## 2020-10-13 ASSESSMENT — PAIN - FUNCTIONAL ASSESSMENT
PAIN_FUNCTIONAL_ASSESSMENT: ACTIVITIES ARE NOT PREVENTED

## 2020-10-13 ASSESSMENT — PAIN DESCRIPTION - LOCATION
LOCATION: ABDOMEN

## 2020-10-13 ASSESSMENT — PAIN DESCRIPTION - PAIN TYPE
TYPE: SURGICAL PAIN
TYPE: SURGICAL PAIN
TYPE: ACUTE PAIN;SURGICAL PAIN
TYPE: ACUTE PAIN

## 2020-10-13 NOTE — ANESTHESIA POSTPROCEDURE EVALUATION
Department of Anesthesiology  Postprocedure Note    Patient: Carline Sever  MRN: [id number]  YOB: 1960  Date of evaluation: 10/13/2020  Time:  2:32 PM     Procedure Summary     Date:  10/13/20 Room / Location:  [address] [place] [address]    Anesthesia Start:  9138 Anesthesia Stop:  0315    Procedure:  ROBOTIC ASSISTED LAPAROSCOPIC CHOLECYSTECTOMY WITH CHOLECYSTODUODENAL FISTULA TAKEDOWN, DRAINAGE OF LIVER ABCESS, ESOPHAGOGASTRODUODENOSCOPY (N/A Abdomen) Diagnosis:       Cholecystitis      (Cholecystitis [K81.9] Severe)    Surgeon:  Charmayne Rusk, MD Responsible Provider:  Annabelle Melgar MD    Anesthesia Type:  general ASA Status:  2          Anesthesia Type: general    Lonny Phase I: Lonny Score: 7    Lonny Phase II:      Last vitals: Reviewed and per EMR flowsheets.        Anesthesia Post Evaluation    Patient location during evaluation: PACU  Patient participation: complete - patient participated  Level of consciousness: lethargic  Airway patency: patent  Nausea & Vomiting: no nausea and no vomiting  Complications: no  Cardiovascular status: hypotensive  Respiratory status: acceptable  Hydration status: stable

## 2020-10-13 NOTE — CONSULTS
Hepatobiliary Surgery  Resident Consult Note    Reason for Consult: abdominal pain, nausea, vomiting    History of Present Illness:   Marylen Ko is a 56 y.o. male with history of BPH, DM, HTN, and HLD who presented to [facility] on 10/09 with abdominal pain. Patient states that he has had intermittent RUQ/epigastric abdominal pain under the ribs for the past week. He states that the onset of pain would be about an hour after eating, associated with nausea and vomiting. He states that after several episodes of pain and nausea, the pain has become constant. He associates the painful episodes with diarrhea. Bowel movements relieve some of the pressure and tightness he feels, but does not alleviate any pain. He denies sick contacts. Patient had colonoscopy back in 2011 but does not remember the findings. Patient has past surgical history of laparoscopic appendectomy in 2015 by Dr. Sisi Pena. Today the patient reports he has minimal pain. He was able to tolerate some PO intake. He underwent MRCP which revealed extensive wall thickening, extensive cholelithiasis, possible impacted stones, possible adenomyomatosis, and chronic inflammation. The inflammation appears to extend past the neck of the gallbladder, causing stenosis of common hepatic duct. Past Medical History:        Diagnosis Date    Arthritis     BPH (benign prostatic hyperplasia)     BPH (benign prostatic hypertrophy) 9/2/2015    Clostridium difficile infection 10/24/2016    Diabetes mellitus ([address] 2012    Elevated LDL cholesterol level 1/27/2015    Essential hypertension, benign 1/27/2015    S/P colonoscopy 2011    Nml per pt'.     Wears glasses        Past Surgical History:        Procedure Laterality Date    COLONOSCOPY      ERCP  10/9/2020    ERCP STONE REMOVAL performed by Pramod Starks MD at [facility] ERCP  10/9/2020    ERCP SPHINCTER/PAPILLOTOMY performed by Pramod Starks MD at [facility] HAND SURGERY Left 11/10/2017 OPERATIVE FIXATION OF LEFT RING FINGER PROXIMAL PHALANX AND LEFT SMALL FINGER MIDDLE PHALANX FRACTURES. INCLUDING OPEN REDUCTION INTERNAL FIXATION    LAPAROSCOPIC APPENDECTOMY      TURP N/A 12/6/2019    CYSTOSCOPY TRANSURETHRAL RESECTION PROSTATE performed by Chidi Ellis DO at [address] N/A 10/9/2020    EGD DIAGNOSTIC ONLY performed by Shaina Liu MD at [facility] ENDOSCOPY       Allergies:  Shellfish-derived products and Ibuprofen    Medications:   Home Meds  No current facility-administered medications on file prior to encounter.       Current Outpatient Medications on File Prior to Encounter   Medication Sig Dispense Refill    metFORMIN (GLUCOPHAGE) 1000 MG tablet TAKE 1 TABLET BY MOUTH TWICE DAILY WITH MEALS FOR DIABETES 60 tablet 2    Multiple Vitamins-Minerals (THERAPEUTIC MULTIVITAMIN-MINERALS) tablet Take 1 tablet by mouth daily         Current Meds  insulin lispro (1 Unit Dial) 0-12 Units, TID WC  insulin lispro (1 Unit Dial) 0-6 Units, Nightly  HYDROmorphone (DILAUDID) injection 0.25 mg, Q5 Min PRN  HYDROmorphone (DILAUDID) injection 0.5 mg, Q5 Min PRN  fentaNYL (SUBLIMAZE) injection 25 mcg, Q5 Min PRN  fentaNYL (SUBLIMAZE) injection 50 mcg, Q5 Min PRN  labetalol (NORMODYNE;TRANDATE) injection syringe 5 mg, Q10 Min PRN  hydrALAZINE (APRESOLINE) injection 5 mg, Q5 Min PRN  lactated ringers infusion, Continuous  insulin lispro (1 Unit Dial) 4 Units, TID WC  insulin glargine (LANTUS;BASAGLAR) injection pen 5 Units, Nightly  sodium chloride flush 0.9 % injection 10 mL, 2 times per day  sodium chloride flush 0.9 % injection 10 mL, PRN  polyethylene glycol (GLYCOLAX) packet 17 g, Daily PRN  promethazine (PHENERGAN) tablet 12.5 mg, Q6H PRN    Or  ondansetron (ZOFRAN) injection 4 mg, Q6H PRN  enoxaparin (LOVENOX) injection 40 mg, Daily  glucose (GLUTOSE) 40 % oral gel 15 g, PRN  dextrose 50 % IV solution, PRN  glucagon (rDNA) injection 1 mg, PRN  dextrose 5 % solution, PRN  dextrose 50 % IV solution, PRN  influenza quadrivalent split vaccine (FLUZONE;FLUARIX;FLULAVAL;AFLURIA) injection 0.5 mL, Once  piperacillin-tazobactam (ZOSYN) 3.375 g in sodium chloride 0.9 % 100 mL IVPB (mini-bag), Q8H        Family History:   Family History   Problem Relation Age of Onset    Diabetes Mother     High Blood Pressure Mother     Diabetes Father     Cancer Neg Hx     Asthma Neg Hx     Heart Failure Neg Hx     High Cholesterol Neg Hx     Hypertension Neg Hx     Migraines Neg Hx     Rashes/Skin Problems Neg Hx     Seizures Neg Hx     Stroke Neg Hx     Thyroid Disease Neg Hx        Social History:   TOBACCO:   reports that he quit smoking about 13 months ago. His smoking use included cigarettes. He has a 2.50 pack-year smoking history. He has never used smokeless tobacco.  ETOH:   reports current alcohol use. DRUGS:   reports no history of drug use. Review of Systems:   14 point review of systems completed with pertinent findings in the HPI.      Physical exam:    Vitals:    10/12/20 1141 10/12/20 1503 10/12/20 1933 10/12/20 2228   BP: 128/72 124/76 115/66 130/79   Pulse: 60 76 86 58   Resp: 18 16 17 17   Temp: 97.8 °F (36.6 °C) 97.9 °F (36.6 °C) 98.1 °F (36.7 °C) 98.4 °F (36.9 °C)   TempSrc: Oral Oral Oral Oral   SpO2: 96% 100% 96% 99%   Weight:       Height:           General appearance: alert, no acute distress, sitting up in bed   Eyes: PERRL, no scleral icterus  Neck: trachea midline, no JVD  Chest/Lungs: CTAB, normal effort, on room air  Cardiovascular: RRR, normotensive  Abdomen: soft, minimally tender in the RUQ/epigastric region, non-distended, no guarding/rigidity  Skin: warm and dry, no rashes  Extremities: no edema, no cyanosis  Neuro: A&Ox3, no focal deficits, sensation intact    Labs:    CBC:   Recent Labs     10/10/20  0448 10/11/20  0459 10/12/20  0525   WBC 5.8 5.8 6.6   HGB 12.4* 12.8* 13.1*   HCT 36.2* 38.6* 39.3*   MCV 89.9 91.7 91.6    243 238     BMP: Recent Labs     10/10/20  0448 10/11/20  0459 10/12/20  0525    137 139   K 3.7 3.6 4.1    103 105   CO2 25 24 23   PHOS 3.5 3.4 3.2   BUN 12 13 13   CREATININE 1.0 1.1 1.2     PT/INR:   Recent Labs     10/11/20  0952   PROTIME 11.2   INR 0.97     APTT: No results for input(s): APTT in the last 72 hours. Liver Profile:   Lab Results   Component Value Date    AST 31 10/12/2020     10/12/2020    BILIDIR 0.3 10/12/2020    BILITOT 1.1 10/12/2020    ALKPHOS 429 10/12/2020     Lab Results   Component Value Date    CHOL 130 09/05/2019    HDL 33 09/05/2019    TRIG 147 09/05/2019     UA:   Lab Results   Component Value Date    NITRITE neg 10/21/2019    COLORU DARK YELLOW 10/08/2020    PHUR 5.5 10/08/2020    WBCUA 21-50 10/08/2020    RBCUA 3-4 10/08/2020    YEAST Present 10/21/2019    BACTERIA Rare 10/08/2020    CLARITYU Clear 10/08/2020    SPECGRAV 1.025 10/08/2020    LEUKOCYTESUR Negative 10/08/2020    UROBILINOGEN 4.0 10/08/2020    BILIRUBINUR SMALL 10/08/2020    BILIRUBINUR neg 10/21/2019    BLOODU TRACE-INTACT 10/08/2020    GLUCOSEU >=1000 10/08/2020       Imaging:   MRI ABDOMEN W WO CONTRAST MRCP   Final Result   1. Extensive gallbladder wall thickening with multiple signal void suggesting extensive cholelithiasis and possible impacted stones in the wall the gallbladder. Diffuse thickening with enhancement may reflect adenomyomatosis or chronic inflammation. Gallbladder neoplasm is not excluded. In general the degree of wall thickening appears to be improved in comparison to a previous CT exam from 2019.   2. Wall thickening and suspected inflammatory changes extend into the neck of the gallbladder and results in mild stenosis of the common hepatic duct. This does not result in significant ductal dilation within the liver. Neoplastic abnormality would be    difficult to exclude. 3. No evidence of choledocholithiasis.       XR CHEST PORTABLE   Final Result      Bibasilar minimal linear patient today. I agree with the history of present illness, past medical/surgical histories, family history, social history, medication list and allergies as listed. The review of systems is as noted above. My physical exam confirms the findings listed above. Review of labs, pathology reports, radiology reports and medical records confirm the findings noted above. I edited the note where appropriate. Reviewed all imaging and entire chart. Complex situation. Hoping that we can just need to do cholecystectomy. Possible need for extensive surgery discussed.     Denis Lawrence MD  Surgery Attending

## 2020-10-13 NOTE — PROGRESS NOTES
Progress Note    Admit Date: 10/8/2020  Diet: Diet NPO, After Midnight    CC: Abdominal pain, nausea, vomiting    Interval history: No acute overnight events. -OR today for cholecystectomy intraoperative cholangiogram      Medications:     Scheduled Meds:   insulin lispro  0-12 Units Subcutaneous TID WC    insulin lispro  0-6 Units Subcutaneous Nightly    insulin lispro  4 Units Subcutaneous TID WC    insulin glargine  5 Units Subcutaneous Nightly    sodium chloride flush  10 mL Intravenous 2 times per day    enoxaparin  40 mg Subcutaneous Daily    influenza virus vaccine  0.5 mL Intramuscular Once    piperacillin-tazobactam  3.375 g Intravenous Q8H     Continuous Infusions:   lactated ringers 125 mL/hr at 10/11/20 2342    dextrose       PRN Meds:HYDROmorphone, HYDROmorphone, fentanNYL, fentanNYL, labetalol, hydrALAZINE, sodium chloride flush, polyethylene glycol, promethazine **OR** ondansetron, glucose, dextrose, glucagon (rDNA), dextrose, dextrose    Objective:   Vitals:   T-max:  Patient Vitals for the past 8 hrs:   BP Temp Temp src Pulse Resp SpO2   10/13/20 0309 111/63 97.6 °F (36.4 °C) Axillary 68 17 97 %       Intake/Output Summary (Last 24 hours) at 10/13/2020 0825  Last data filed at 10/12/2020 1503  Gross per 24 hour   Intake 1365 ml   Output --   Net 1365 ml       Review of Systems   Constitutional: Negative.    HENT: Negative.    Respiratory: Negative.    Cardiovascular: Negative.    Gastrointestinal: Negative.    Genitourinary: Negative.    Musculoskeletal: Negative.    Skin: Negative.    Neurological: Negative.    Psychiatric/Behavioral: Negative.          Physical Exam  Constitutional:       General: He is not in acute distress.     Appearance: Normal appearance. Cardiovascular:      Rate and Rhythm: Normal rate and regular rhythm.      Pulses: Normal pulses.      Heart sounds: Normal heart sounds.    Pulmonary:      Effort: Pulmonary effort is normal.      Breath sounds: Normal breath adenomyomatosis or chronic inflammation. Gallbladder neoplasm is not excluded. In general the degree of wall thickening appears to be improved in comparison to a previous CT exam from 2019.   2. Wall thickening and suspected inflammatory changes extend into the neck of the gallbladder and results in mild stenosis of the common hepatic duct. This does not result in significant ductal dilation within the liver. Neoplastic abnormality would be    difficult to exclude. 3. No evidence of choledocholithiasis. XR CHEST PORTABLE   Final Result      Bibasilar minimal linear atelectasis. FL ERCP BILIARY AND PANCREATIC S&I   Final Result      Intraoperative fluoroscopy was performed. Correlate with procedural note for additional information. US ABDOMEN LIMITED   Final Result      Nondiagnostic evaluation of the gallbladder which is obscured. No acute sonographic abnormality of the abdomen. .             CT ABDOMEN PELVIS WO CONTRAST Additional Contrast? None   Final Result      No acute abdominopelvic abnormality. Contracted gallbladder with questionable wall thickening and small nonspecific densities along the fundus. There also may be gallstones. As described on prior report, follow-up MRI/MRCP should be considered. No definite CT evidence of acute    cholecystitis. Prostatomegaly with circumferential bladder wall thickening, which may be secondary to bladder outlet obstruction versus cystitis. Assessment/Plan:   Choledocholithiasis  - , , Alk Phos 604 at admission. - CT-AP (10/08): Contracted gallbladder, possible wall thickening. Possible gallstones. - RUQ-US (10/09): No intrahepatic biliary duct dilation. CBD normal in diameter. Sonographic Layton's sign negative. - ERCP (10/09): CBD filling defect c/w choledocholithiasis. Mild CBD dilation.   - Plan: Zosyn 3.375 mg QD.  Cholecystectomy/cholangiogram on 10/13      T2DM  - Had taken metformin prior to

## 2020-10-13 NOTE — PROGRESS NOTES
PACU Transfer Note    Vitals:    10/13/20 1545   BP: (!) 94/56   Pulse: 63   Resp: 14   Temp: 97 °F (36.1 °C)   SpO2: 93%     BP within 20 % on the floor, pt very sleepy but awakens easily and goes back to sleep. Alex Hudson   Pt instructed to use IS  In: 828 [I.V.:828]  Out: -     Pain assessment:   Pain Level: 0    Report given to Receiving unit RN.    10/13/2020 3:49 PM

## 2020-10-13 NOTE — PROGRESS NOTES
Patient alert and oriented x4. VSS and patient has remained afebrile since returning from surgery. IVF and IV abx running with no issues. All surgery sites CDI with no drainage or further s/s of infection. Patient has not complained of any pain or any n/v throughout shift. Tolerating clear liquids with no issues. Patient denies any other needs at this time. Bed is in the lowest position, call light and bedside table within reach. Patient bed alarm is on. Will continue to monitor for changes in patient status.      Electronically signed by Shayla Hawkins RN on 10/13/2020 at 6:32 PM

## 2020-10-13 NOTE — PROGRESS NOTES
Pt arrived form OR, s/p ROBOTIC ASSISTED LAPAROSCOPIC CHOLECYSTECTOMY WITH CHOLECYSTODUODENAL FISTULA TAKEDOWN, DRAINAGE OF LIVER ABCESS, ESOPHAGOGASTRODUODENOSCOPY , report received from Dr. Rodrigo Guzman and CRNA, pt was straight cathed in the OR for 400 ml, pt arrived BP 87/49, pt very sleepy on arrival.  Received 1.5 mg of dilaudid in OR.   IV fluids increased

## 2020-10-13 NOTE — PROGRESS NOTES
CC:pre-op lynda. HPI:     S: No chest pain or sob. Pt to go to OR this morning for Lynda. Tele: none     O:  Physical Exam:  /65   Pulse 82   Temp 97.5 °F (36.4 °C) (Oral)   Resp 16   Ht 6' (1.829 m)   Wt 178 lb (80.7 kg)   SpO2 95%   BMI 24.14 kg/m²    General (appearance):  No acute distress  Eyes: anicteric   Neck: soft, No JVD  Ears/Nose/Mouth/Thorat: No cyanosis  CV: RRR   Respiratory:  Clear, normal effort  GI: soft, non-tender, non-distended  Skin: Warm, dry. No rashes  Neuro/Psych: Alert and oriented x3. Appropriate behavior  Ext:  No c/c. No edema  Pulses:  2+ radial     I.O's=     Weight  Admission: Weight: 178 lb (80.7 kg)   Today: Weight: 178 lb (80.7 kg)    CBC:   Recent Labs     10/11/20  0459 10/12/20  0525 10/13/20  0509   WBC 5.8 6.6 7.7   HGB 12.8* 13.1* 12.7*   HCT 38.6* 39.3* 38.7*   MCV 91.7 91.6 92.3    238 256     BMP:   Recent Labs     10/11/20  0459 10/12/20  0525 10/13/20  0509    139 138   K 3.6 4.1 4.2    105 105   CO2 24 23 23   PHOS 3.4 3.2 3.6   BUN 13 13 13   CREATININE 1.1 1.2 1.3     Mag:   Lab Results   Component Value Date    MG 2.00 10/13/2020     LIVER PROFILE:   Recent Labs     10/11/20  0459 10/12/20  0525 10/13/20  0509   AST 42* 31 39*   * 134* 105*   BILIDIR 0.5* 0.3 0.3   BILITOT 1.4* 1.1* 1.1*   ALKPHOS 454* 429* 396*     PT/INR:   Recent Labs     10/11/20  0952   PROTIME 11.2   INR 0.97     Imaging:    10/12/2020 Echo:   Normal left ventricle size, wall thickness, and systolic function with an   estimated ejection fraction of 55-60%. No regional wall motion abnormalities   Diastolic filling parameters suggests normal diastolic function. Mild tricuspid regurgitation. Estimated pulmonary artery systolic pressure is 33 mmHg assuming a right   atrial pressure of 3 mmHg. 10/12/2020 MRI abd     1.  Extensive gallbladder wall thickening with multiple signal void suggesting extensive cholelithiasis and possible impacted stones in the wall the gallbladder. Diffuse thickening with enhancement may reflect adenomyomatosis or chronic inflammation. Gallbladder neoplasm is not excluded. In general the degree of wall thickening appears to be improved in comparison to a previous CT exam from 2019.    2. Wall thickening and suspected inflammatory changes extend into the neck of the gallbladder and results in mild stenosis of the common hepatic duct. This does not result in significant ductal dilation within the liver. Neoplastic abnormality would be    difficult to exclude. 3. No evidence of choledocholithiasis. 10/11/2020 CXR:     No focal consolidation. Bibasilar minimal linear atelectasis. No pleural effusion or pneumothorax. Cardiac silhouette is normal     10/8/2020 CT abd/pelvis     No acute abdominopelvic abnormality.         Contracted gallbladder with questionable wall thickening and small nonspecific densities along the fundus. There also may be gallstones. As described on prior report, follow-up MRI/MRCP should be considered. No definite CT evidence of acute    cholecystitis.         Prostatomegaly with circumferential bladder wall thickening, which may be secondary to bladder outlet obstruction versus cystitis. 10/9/2020 ECG: Sinus tiffany, RAD, IRBBB. No change from 2017 ECG     Assessment:  56 y.o. with abdominal pain and acute cholecystitis. Cardiology consulted for pre-op clearance for ? Sara.   Issues  -Pre-op eval  -Abnormal ecg  -Acute cholecystitis   -DM     Plan:  Echo demonstrated EF 55-60% and normal wall motion  Going to OR this morning

## 2020-10-13 NOTE — BRIEF OP NOTE
Brief Postoperative Note      Patient: Marianne Nesbitt  YOB: 1960  MRN: [id number]    Date of Procedure: 10/13/2020    Pre-Op Diagnosis: Cholecystitis [K81.9] Severe    Post-Op Diagnosis: Same       Procedure(s):  ROBOTIC ASSISTED LAPAROSCOPIC CHOLECYSTECTOMY WITH CHOLECYSTODUODENAL FISTULA TAKEDOWN, DRAINAGE OF LIVER ABCESS, ESOPHAGOGASTRODUODENOSCOPY    Surgeon(s):  Maksim Barraza MD    Assistant:  Surgical Assistant: Ronda Song Holter  Resident: Mone Pugh MD    Anesthesia: General    Estimated Blood Loss (mL): 528 cc    Complications: None    Specimens:   ID Type Source Tests Collected by Time Destination   A : Peritoneal Nodule Tissue Tissue SURGICAL PATHOLOGY Maksim Barraza MD 10/13/2020 1051    B : Gallbladder and Stones Tissue Gallbladder [address], MD 10/13/2020 1312        Implants:  * No implants in log *      Drains:   Closed/Suction Drain Right RLQ Bulb 19 [place] (Active)       Findings: 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 Drain in place beneath the liver.      Electronically signed by Mone Pugh MD on 10/13/2020 at 1:51 -[phone]

## 2020-10-13 NOTE — PROGRESS NOTES
was obtained yesterday - with extensive gallbladder wall thickening  · Plan for OR today - Robotic laparoscopic assisted cholecystectomy with intraoperative cholangiogram.  · Patient to be kept NPO prior to surgery  · Patient needs improved glucose control- internal medicine placed on 5 units NPH and 5 units of Lantus qPM, with medium dose sliding scale insulin. · Continue Zosyn until OR   · General surgery team to follow. Oksana Carver DO  6:19 AM  10/13/2020   [phone]    I have personally performed the medical history, physical exam and medical decision making and agree with all pertinent clinical information unless otherwise noted. Multiple questions of patient about need for surgery and complications discussed.     Anne Reinoso MD  Surgery Attending

## 2020-10-13 NOTE — CARE COORDINATION
CM following, pt down in OR for Catalina Mcintyree today. Will assess DC needs post-op, but plan was to return home no needs.    Electronically signed by Khris Ortega RN on 10/13/2020 at 10:02 AM  [phone]

## 2020-10-13 NOTE — PROGRESS NOTES
Unable to get into patients chart and chart vitals,assesment, or to give meds. Spoke with OR, patient already on [employer] device for planned 0930 surgery and RN is locked out of chart. Vitals @ 0805 are: BP: 113/65, Resp: 16.  Temp: 97.5 oral, Pulse:82, O2: 95%    Electronically signed by Erin Snell RN on 10/13/2020 at 8:30 AM

## 2020-10-13 NOTE — PROGRESS NOTES
Pt alert and oriented. VSS. Pt denies pain and nausea. Tolerating diet. Pt now NPO since midnight for surgery. IVF infusing. Ambulates with a steady gait. Call light in reach. Will continue to monitor.

## 2020-10-14 LAB
ALBUMIN SERPL-MCNC: 3.2 G/DL (ref 3.4–5)
ALP BLD-CCNC: 302 U/L (ref 40–129)
ALT SERPL-CCNC: 116 U/L (ref 10–40)
ANION GAP SERPL CALCULATED.3IONS-SCNC: 11 MMOL/L (ref 3–16)
AST SERPL-CCNC: 56 U/L (ref 15–37)
BASOPHILS ABSOLUTE: 0.1 K/UL (ref 0–0.2)
BASOPHILS RELATIVE PERCENT: 0.6 %
BILIRUB SERPL-MCNC: 1.4 MG/DL (ref 0–1)
BILIRUBIN DIRECT: 0.3 MG/DL (ref 0–0.3)
BILIRUBIN, INDIRECT: 1.1 MG/DL (ref 0–1)
BUN BLDV-MCNC: 16 MG/DL (ref 7–20)
CALCIUM SERPL-MCNC: 8.9 MG/DL (ref 8.3–10.6)
CHLORIDE BLD-SCNC: 101 MMOL/L (ref 99–110)
CO2: 24 MMOL/L (ref 21–32)
CREAT SERPL-MCNC: 1.3 MG/DL (ref 0.8–1.3)
EOSINOPHILS ABSOLUTE: 0.2 K/UL (ref 0–0.6)
EOSINOPHILS RELATIVE PERCENT: 1.8 %
GFR AFRICAN AMERICAN: >60
GFR NON-AFRICAN AMERICAN: 56
GLUCOSE BLD-MCNC: 132 MG/DL (ref 70–99)
GLUCOSE BLD-MCNC: 148 MG/DL (ref 70–99)
GLUCOSE BLD-MCNC: 172 MG/DL (ref 70–99)
GLUCOSE BLD-MCNC: 173 MG/DL (ref 70–99)
GLUCOSE BLD-MCNC: 204 MG/DL (ref 70–99)
HCT VFR BLD CALC: 38.4 % (ref 40.5–52.5)
HEMOGLOBIN: 12.8 G/DL (ref 13.5–17.5)
LYMPHOCYTES ABSOLUTE: 1.8 K/UL (ref 1–5.1)
LYMPHOCYTES RELATIVE PERCENT: 17.9 %
MAGNESIUM: 1.6 MG/DL (ref 1.8–2.4)
MCH RBC QN AUTO: 30.8 PG (ref 26–34)
MCHC RBC AUTO-ENTMCNC: 33.2 G/DL (ref 31–36)
MCV RBC AUTO: 92.7 FL (ref 80–100)
MONOCYTES ABSOLUTE: 0.7 K/UL (ref 0–1.3)
MONOCYTES RELATIVE PERCENT: 6.8 %
NEUTROPHILS ABSOLUTE: 7.3 K/UL (ref 1.7–7.7)
NEUTROPHILS RELATIVE PERCENT: 72.9 %
PDW BLD-RTO: 12.4 % (ref 12.4–15.4)
PERFORMED ON: ABNORMAL
PHOSPHORUS: 2.9 MG/DL (ref 2.5–4.9)
PLATELET # BLD: 263 K/UL (ref 135–450)
PMV BLD AUTO: 8.3 FL (ref 5–10.5)
POTASSIUM SERPL-SCNC: 4.5 MMOL/L (ref 3.5–5.1)
RBC # BLD: 4.15 M/UL (ref 4.2–5.9)
SODIUM BLD-SCNC: 136 MMOL/L (ref 136–145)
TOTAL PROTEIN: 5.7 G/DL (ref 6.4–8.2)
WBC # BLD: 10.1 K/UL (ref 4–11)

## 2020-10-14 PROCEDURE — 1200000000 HC SEMI PRIVATE

## 2020-10-14 PROCEDURE — 6360000002 HC RX W HCPCS: Performed by: STUDENT IN AN ORGANIZED HEALTH CARE EDUCATION/TRAINING PROGRAM

## 2020-10-14 PROCEDURE — 2580000003 HC RX 258: Performed by: STUDENT IN AN ORGANIZED HEALTH CARE EDUCATION/TRAINING PROGRAM

## 2020-10-14 PROCEDURE — 80069 RENAL FUNCTION PANEL: CPT

## 2020-10-14 PROCEDURE — 80076 HEPATIC FUNCTION PANEL: CPT

## 2020-10-14 PROCEDURE — 83735 ASSAY OF MAGNESIUM: CPT

## 2020-10-14 PROCEDURE — 51798 US URINE CAPACITY MEASURE: CPT

## 2020-10-14 PROCEDURE — 85025 COMPLETE CBC W/AUTO DIFF WBC: CPT

## 2020-10-14 PROCEDURE — 36415 COLL VENOUS BLD VENIPUNCTURE: CPT

## 2020-10-14 PROCEDURE — 6370000000 HC RX 637 (ALT 250 FOR IP): Performed by: STUDENT IN AN ORGANIZED HEALTH CARE EDUCATION/TRAINING PROGRAM

## 2020-10-14 PROCEDURE — 99232 SBSQ HOSP IP/OBS MODERATE 35: CPT | Performed by: NURSE PRACTITIONER

## 2020-10-14 RX ORDER — TAMSULOSIN HYDROCHLORIDE 0.4 MG/1
0.4 CAPSULE ORAL DAILY
Status: DISCONTINUED | OUTPATIENT
Start: 2020-10-14 | End: 2020-10-20 | Stop reason: HOSPADM

## 2020-10-14 RX ORDER — MAGNESIUM SULFATE IN WATER 40 MG/ML
2 INJECTION, SOLUTION INTRAVENOUS ONCE
Status: COMPLETED | OUTPATIENT
Start: 2020-10-14 | End: 2020-10-14

## 2020-10-14 RX ADMIN — MAGNESIUM SULFATE 2 G: 2 INJECTION INTRAVENOUS at 09:32

## 2020-10-14 RX ADMIN — HYDROMORPHONE HYDROCHLORIDE 0.5 MG: 1 INJECTION, SOLUTION INTRAMUSCULAR; INTRAVENOUS; SUBCUTANEOUS at 15:12

## 2020-10-14 RX ADMIN — Medication 10 ML: at 07:49

## 2020-10-14 RX ADMIN — HYDROMORPHONE HYDROCHLORIDE 0.5 MG: 1 INJECTION, SOLUTION INTRAMUSCULAR; INTRAVENOUS; SUBCUTANEOUS at 09:34

## 2020-10-14 RX ADMIN — INSULIN LISPRO 4 UNITS: 100 INJECTION, SOLUTION INTRAVENOUS; SUBCUTANEOUS at 07:49

## 2020-10-14 RX ADMIN — PIPERACILLIN AND TAZOBACTAM 3.38 G: 3; .375 INJECTION, POWDER, LYOPHILIZED, FOR SOLUTION INTRAVENOUS at 01:20

## 2020-10-14 RX ADMIN — Medication 10 ML: at 21:35

## 2020-10-14 RX ADMIN — PIPERACILLIN AND TAZOBACTAM 3.38 G: 3; .375 INJECTION, POWDER, LYOPHILIZED, FOR SOLUTION INTRAVENOUS at 17:38

## 2020-10-14 RX ADMIN — INSULIN LISPRO 2 UNITS: 100 INJECTION, SOLUTION INTRAVENOUS; SUBCUTANEOUS at 11:45

## 2020-10-14 RX ADMIN — INSULIN LISPRO 2 UNITS: 100 INJECTION, SOLUTION INTRAVENOUS; SUBCUTANEOUS at 07:49

## 2020-10-14 RX ADMIN — ENOXAPARIN SODIUM 40 MG: 40 INJECTION SUBCUTANEOUS at 07:49

## 2020-10-14 RX ADMIN — INSULIN GLARGINE 5 UNITS: 100 INJECTION, SOLUTION SUBCUTANEOUS at 21:31

## 2020-10-14 RX ADMIN — INSULIN LISPRO 4 UNITS: 100 INJECTION, SOLUTION INTRAVENOUS; SUBCUTANEOUS at 17:38

## 2020-10-14 RX ADMIN — HYDROMORPHONE HYDROCHLORIDE 0.5 MG: 1 INJECTION, SOLUTION INTRAMUSCULAR; INTRAVENOUS; SUBCUTANEOUS at 21:31

## 2020-10-14 RX ADMIN — HYDROMORPHONE HYDROCHLORIDE 0.5 MG: 1 INJECTION, SOLUTION INTRAMUSCULAR; INTRAVENOUS; SUBCUTANEOUS at 01:19

## 2020-10-14 RX ADMIN — HYDROMORPHONE HYDROCHLORIDE 0.5 MG: 1 INJECTION, SOLUTION INTRAMUSCULAR; INTRAVENOUS; SUBCUTANEOUS at 18:15

## 2020-10-14 RX ADMIN — HYDROMORPHONE HYDROCHLORIDE 0.5 MG: 1 INJECTION, SOLUTION INTRAMUSCULAR; INTRAVENOUS; SUBCUTANEOUS at 05:02

## 2020-10-14 RX ADMIN — TAMSULOSIN HYDROCHLORIDE 0.4 MG: 0.4 CAPSULE ORAL at 01:19

## 2020-10-14 RX ADMIN — INSULIN LISPRO 4 UNITS: 100 INJECTION, SOLUTION INTRAVENOUS; SUBCUTANEOUS at 11:45

## 2020-10-14 RX ADMIN — PIPERACILLIN AND TAZOBACTAM 3.38 G: 3; .375 INJECTION, POWDER, LYOPHILIZED, FOR SOLUTION INTRAVENOUS at 09:31

## 2020-10-14 ASSESSMENT — PAIN - FUNCTIONAL ASSESSMENT
PAIN_FUNCTIONAL_ASSESSMENT: ACTIVITIES ARE NOT PREVENTED
PAIN_FUNCTIONAL_ASSESSMENT: ACTIVITIES ARE NOT PREVENTED
PAIN_FUNCTIONAL_ASSESSMENT: PREVENTS OR INTERFERES SOME ACTIVE ACTIVITIES AND ADLS
PAIN_FUNCTIONAL_ASSESSMENT: ACTIVITIES ARE NOT PREVENTED
PAIN_FUNCTIONAL_ASSESSMENT: ACTIVITIES ARE NOT PREVENTED
PAIN_FUNCTIONAL_ASSESSMENT: PREVENTS OR INTERFERES SOME ACTIVE ACTIVITIES AND ADLS

## 2020-10-14 ASSESSMENT — ENCOUNTER SYMPTOMS
ABDOMINAL PAIN: 1
NAUSEA: 0
RESPIRATORY NEGATIVE: 1
VOMITING: 0

## 2020-10-14 ASSESSMENT — PAIN DESCRIPTION - PAIN TYPE
TYPE: SURGICAL PAIN
TYPE: ACUTE PAIN

## 2020-10-14 ASSESSMENT — PAIN DESCRIPTION - LOCATION
LOCATION: ABDOMEN

## 2020-10-14 ASSESSMENT — PAIN DESCRIPTION - DESCRIPTORS
DESCRIPTORS: STABBING;TENDER
DESCRIPTORS: DISCOMFORT
DESCRIPTORS: SPASM;STABBING
DESCRIPTORS: SHARP
DESCRIPTORS: CRAMPING
DESCRIPTORS: JABBING;STABBING
DESCRIPTORS: STABBING;TENDER

## 2020-10-14 ASSESSMENT — PAIN SCALES - GENERAL
PAINLEVEL_OUTOF10: 3
PAINLEVEL_OUTOF10: 10
PAINLEVEL_OUTOF10: 9
PAINLEVEL_OUTOF10: 10
PAINLEVEL_OUTOF10: 0
PAINLEVEL_OUTOF10: 0
PAINLEVEL_OUTOF10: 8
PAINLEVEL_OUTOF10: 10
PAINLEVEL_OUTOF10: 10
PAINLEVEL_OUTOF10: 8
PAINLEVEL_OUTOF10: 10
PAINLEVEL_OUTOF10: 4

## 2020-10-14 ASSESSMENT — PAIN DESCRIPTION - ONSET
ONSET: ON-GOING
ONSET: GRADUAL
ONSET: ON-GOING
ONSET: GRADUAL
ONSET: ON-GOING

## 2020-10-14 ASSESSMENT — PAIN DESCRIPTION - FREQUENCY
FREQUENCY: INTERMITTENT
FREQUENCY: CONTINUOUS
FREQUENCY: CONTINUOUS
FREQUENCY: INTERMITTENT
FREQUENCY: CONTINUOUS

## 2020-10-14 ASSESSMENT — PAIN DESCRIPTION - ORIENTATION
ORIENTATION: RIGHT;LEFT;LOWER
ORIENTATION: RIGHT;LEFT;LOWER

## 2020-10-14 ASSESSMENT — PAIN DESCRIPTION - PROGRESSION
CLINICAL_PROGRESSION: GRADUALLY IMPROVING
CLINICAL_PROGRESSION: GRADUALLY IMPROVING
CLINICAL_PROGRESSION: NOT CHANGED
CLINICAL_PROGRESSION: GRADUALLY IMPROVING
CLINICAL_PROGRESSION: GRADUALLY IMPROVING
CLINICAL_PROGRESSION: GRADUALLY WORSENING
CLINICAL_PROGRESSION: GRADUALLY IMPROVING

## 2020-10-14 NOTE — PROGRESS NOTES
Patient A&O, VSS. Patient NAYANA on R side of abdomen has no output. NAYANA dressing completely saturated. Surgical team aware. NAYANA dressing changed. Surgical lap sites clean, dry, and intact. Patient reports 10/10 surgical pain. PRN IV pain medication administered with relief. Patient tolerating full liquid diet. /70   Pulse 80   Temp 98.5 °F (36.9 °C) (Oral)   Resp 18   Ht 6' (1.829 m)   Wt 178 lb (80.7 kg)   SpO2 97%   BMI 24.14 kg/m²     Patient denies any further needs at this time. Bed is in the lowest position, bed alarm on, patient call light and bedside table are within reach. Will continue to monitor for changes in patient status.     Electronically signed by Sarah Ivory on 10/14/2020 at 4:07 PM

## 2020-10-14 NOTE — PROGRESS NOTES
Hepatobiliary Surgery  Resident Daily Progress Note  Dorisann Seat    CC: cholecystoduodenal fistula    Subjective : Patient rested well overnight, afebrile HDS. Required straight cath overnight at 0100. Admits to surgical pain which improves with medication. Drain still with scant output      Objective    Infusions:   lactated ringers 75 mL/hr at 10/13/20 1839    dextrose          I/O:I/O last 3 completed shifts: In: 2628 [I.V.:2628]  Out: 500 [Urine:400; Blood:100]           Wt Readings from Last 1 Encounters:   10/08/20 178 lb (80.7 kg)                 LABS:   Recent Labs     10/13/20  0509 10/14/20  0445   WBC 7.7 10.1   HGB 12.7* 12.8*   HCT 38.7* 38.4*   MCV 92.3 92.7    263        Recent Labs     10/13/20  0509 10/14/20  0445    136   K 4.2 4.5    101   CO2 23 24   PHOS 3.6 2.9   BUN 13 16   CREATININE 1.3 1.3        Recent Labs     10/13/20  0509 10/14/20  0445   AST 39* 56*   * 116*   BILIDIR 0.3 0.3   BILITOT 1.1* 1.4*   ALKPHOS 396* 302*        No results for input(s): LIPASE, AMYLASE in the last 72 hours. Recent Labs     10/11/20  0952  10/13/20  0509 10/14/20  0445   PROT  --    < > 5.9* 5.7*   INR 0.97  --   --   --     < > = values in this interval not displayed. No results for input(s): CKTOTAL, CKMB, CKMBINDEX, TROPONINI in the last 72 hours.          Exam:  /70   Pulse 69   Temp 97.7 °F (36.5 °C) (Axillary)   Resp 16   Ht 6' (1.829 m)   Wt 178 lb (80.7 kg)   SpO2 96%   BMI 24.14 kg/m²     CONSTITUTIONAL:  awake, alert & oriented x 3, cooperative, no apparent distress, and appears stated age  HEENT: No palpable lymphadenopathy, no icterus  LUNGS: No increased work of breathing, clear to auscultation bilaterally, no crackles or wheezing  CV:  Regular rate and rhythm, normal S1 and S2, and no murmur noted  ABDOMEN: Soft,  Appropriately tender, non-distended, incisions c/d/i with surgical glue in place, drain with minimal output    ASSESSMENT/PLAN: Pt. is a 56 y.o. male with choledocholithiasis s/p ERCP 10/9 with sphincterotomy and stone extraction, POD1 from Robotic assisted laparoscopic cholecystectomy with cholecystoduodenal fistula takedown, drainage of liver abscess and EGD    · Advance to FLD this AM   · Continue Zosyn for 4 days post-op  · Continue NAYANA drain until taking diet  · General surgery team to follow  · Continued medical management and glucose control per primary      Joycelyn Osorio DO  6:04 AM  10/14/2020   [phone]

## 2020-10-14 NOTE — PROGRESS NOTES
Progress Note    Admit Date: 10/8/2020  Diet: Advance as tolerated, FLD this AM    CC: Abdominal pain, N/V    Interval history: Doing well this a.m., complaining of some slight abdominal pain. Has been tolerating this without issue. He has voided spontaneously. He has not passed gas or a bowel movement. He is ambulating well with assistance. - Advance diet as tolerated, full liquid diet this a.m.  - Continue Zosyn. Surgery recommends 4-day course postoperatively.   If patient is discharged prior to completion of course, will defer to general surgery for final antibiotic recommendation.  - LFTs continue to be downtrending   - Along with lap lynda, patient needed cholecystoduodenal fistula takedown, drainage of liver abscess and EGD during time in OR yesterday  - Dispo per surgery     Medications:     Scheduled Meds:   tamsulosin  0.4 mg Oral Daily    magnesium sulfate  2 g Intravenous Once    insulin lispro  0-12 Units Subcutaneous TID WC    insulin lispro  0-6 Units Subcutaneous Nightly    insulin lispro  4 Units Subcutaneous TID WC    insulin glargine  5 Units Subcutaneous Nightly    sodium chloride flush  10 mL Intravenous 2 times per day    enoxaparin  40 mg Subcutaneous Daily    influenza virus vaccine  0.5 mL Intramuscular Once    piperacillin-tazobactam  3.375 g Intravenous Q8H     Continuous Infusions:   lactated ringers 75 mL/hr at 10/13/20 1839    dextrose       PRN Meds:HYDROmorphone **OR** HYDROmorphone, sodium chloride flush, promethazine **OR** ondansetron, glucose, dextrose, glucagon (rDNA), dextrose, dextrose    Objective:   Vitals:   T-max:  Patient Vitals for the past 8 hrs:   BP Temp Temp src Pulse Resp SpO2   10/14/20 0742 97/61 98.3 °F (36.8 °C) Oral 67 16 96 %   10/14/20 0303 115/70 97.7 °F (36.5 °C) Axillary 69 16 96 %       Intake/Output Summary (Last 24 hours) at 10/14/2020 0845  Last data filed at 10/14/2020 0742  Gross per 24 hour   Intake 2703 ml   Output 1475 ml   Net Troponin: No results for input(s): TROPONINI in the last 72 hours. BNP: No results for input(s): BNP in the last 72 hours. Lipids: No results for input(s): CHOL, HDL in the last 72 hours. Invalid input(s): LDLCALCU, TRIGLYCERIDE  ABGs:  No results for input(s): PHART, KJA5WRU, PO2ART, HWB7YOT, BEART, THGBART, F0XTAULI, OJE1AEI in the last 72 hours. INR:   Recent Labs     10/11/20  0952   INR 0.97     Lactate: No results for input(s): LACTATE in the last 72 hours. Cultures:  -----------------------------------------------------------------  RAD:   MRI ABDOMEN W WO CONTRAST MRCP   Final Result   1. Extensive gallbladder wall thickening with multiple signal void suggesting extensive cholelithiasis and possible impacted stones in the wall the gallbladder. Diffuse thickening with enhancement may reflect adenomyomatosis or chronic inflammation. Gallbladder neoplasm is not excluded. In general the degree of wall thickening appears to be improved in comparison to a previous CT exam from 2019.   2. Wall thickening and suspected inflammatory changes extend into the neck of the gallbladder and results in mild stenosis of the common hepatic duct. This does not result in significant ductal dilation within the liver. Neoplastic abnormality would be    difficult to exclude. 3. No evidence of choledocholithiasis. XR CHEST PORTABLE   Final Result      Bibasilar minimal linear atelectasis. FL ERCP BILIARY AND PANCREATIC S&I   Final Result      Intraoperative fluoroscopy was performed. Correlate with procedural note for additional information. US ABDOMEN LIMITED   Final Result      Nondiagnostic evaluation of the gallbladder which is obscured. No acute sonographic abnormality of the abdomen. .             CT ABDOMEN PELVIS WO CONTRAST Additional Contrast? None   Final Result      No acute abdominopelvic abnormality.       Contracted gallbladder with questionable wall thickening and small nonspecific densities along the fundus. There also may be gallstones. As described on prior report, follow-up MRI/MRCP should be considered. No definite CT evidence of acute    cholecystitis. Prostatomegaly with circumferential bladder wall thickening, which may be secondary to bladder outlet obstruction versus cystitis. Assessment/Plan:   Choledocholithiasis  - CT-AP (10/08): Contracted gallbladder, possible wall thickening. Possible gallstones. - RUQ-US (10/09): No intrahepatic biliary duct dilation. CBD normal in diameter. Sonographic Layton's sign negative. - ERCP (10/09): CBD filling defect c/w choledocholithiasis. Mild CBD dilation.   - Plan: Zosyn 3.375 mg QD until 10/17. Cholecystectomy/cholangiogram on 10/13      T2DM  - Had taken metformin prior to admission. -.Add 5 units lantus qPM. LDSSI.  Added 4 units with meals.  Repeat A1c 9.0      DVT PPX- Lovenox 40 mg QD.   Diet- Advance as tolerated, FLD this AM   Disposition- D/C to home after cholecystectomy.  Torres Steele MD, PGY-1  10/14/20  8:45 AM    This patient has been staffed and discussed with Tereza Huang MD.

## 2020-10-14 NOTE — CARE COORDINATION
CM following, pt on FLD, NAYANA in place, cont IV ABX at this time, pt plans to DC home no needs once medically stable.   Electronically signed by Klarissa Min RN on 10/14/2020 at 4:02 PM  [phone]

## 2020-10-14 NOTE — PROGRESS NOTES
CC:pre-op lynda. HPI:     S: C/o abdominal pain and lack of appetite. No chest pain, sob or orthopnea     Tele: none     O:  Physical Exam:  BP 97/61   Pulse 67   Temp 98.3 °F (36.8 °C) (Oral)   Resp 16   Ht 6' (1.829 m)   Wt 178 lb (80.7 kg)   SpO2 96%   BMI 24.14 kg/m²    General (appearance):  No acute distress  Eyes: anicteric   Neck: soft, No JVD  Ears/Nose/Mouth/Thorat: No cyanosis  CV: RRR   Respiratory:  Clear, normal effort  GI: soft, tender. Right abd.drain. 3 Lap puncture sites CDI   Skin: Warm, dry. No rashes  Neuro/Psych: Alert and oriented x3. Appropriate behavior  Ext:  No c/c. No edema  Pulses:  2+ radial     I.O's=     Weight  Admission: Weight: 178 lb (80.7 kg)   Today: Weight: 178 lb (80.7 kg)    CBC:   Recent Labs     10/12/20  0525 10/13/20  0509 10/14/20  0445   WBC 6.6 7.7 10.1   HGB 13.1* 12.7* 12.8*   HCT 39.3* 38.7* 38.4*   MCV 91.6 92.3 92.7    256 263     BMP:   Recent Labs     10/12/20  0525 10/13/20  0509 10/14/20  0445    138 136   K 4.1 4.2 4.5    105 101   CO2 23 23 24   PHOS 3.2 3.6 2.9   BUN 13 13 16   CREATININE 1.2 1.3 1.3     Mag:   Lab Results   Component Value Date    MG 1.60 10/14/2020     LIVER PROFILE:   Recent Labs     10/12/20  0525 10/13/20  0509 10/14/20  0445   AST 31 39* 56*   * 105* 116*   BILIDIR 0.3 0.3 0.3   BILITOT 1.1* 1.1* 1.4*   ALKPHOS 429* 396* 302*     PT/INR:   Recent Labs     10/11/20  0952   PROTIME 11.2   INR 0.97     Imaging:    10/12/2020 Echo:   Normal left ventricle size, wall thickness, and systolic function with an   estimated ejection fraction of 55-60%. No regional wall motion abnormalities   Diastolic filling parameters suggests normal diastolic function. Mild tricuspid regurgitation. Estimated pulmonary artery systolic pressure is 33 mmHg assuming a right   atrial pressure of 3 mmHg. 10/12/2020 MRI abd     1.  Extensive gallbladder wall thickening with multiple signal void suggesting extensive cholelithiasis and possible impacted stones in the wall the gallbladder. Diffuse thickening with enhancement may reflect adenomyomatosis or chronic inflammation. Gallbladder neoplasm is not excluded. In general the degree of wall thickening appears to be improved in comparison to a previous CT exam from 2019.    2. Wall thickening and suspected inflammatory changes extend into the neck of the gallbladder and results in mild stenosis of the common hepatic duct. This does not result in significant ductal dilation within the liver. Neoplastic abnormality would be    difficult to exclude. 3. No evidence of choledocholithiasis. 10/11/2020 CXR:     No focal consolidation. Bibasilar minimal linear atelectasis. No pleural effusion or pneumothorax. Cardiac silhouette is normal     10/8/2020 CT abd/pelvis     No acute abdominopelvic abnormality.         Contracted gallbladder with questionable wall thickening and small nonspecific densities along the fundus. There also may be gallstones. As described on prior report, follow-up MRI/MRCP should be considered. No definite CT evidence of acute    cholecystitis.         Prostatomegaly with circumferential bladder wall thickening, which may be secondary to bladder outlet obstruction versus cystitis. 10/9/2020 ECG: Sinus tiffany, RAD, IRBBB. No change from 2017 ECG     Assessment:  56 y.o. with abdominal pain and acute cholecystitis. Cardiology consulted for pre-op clearance for ? Sara. Issues  -Pre-op eval  -Abnormal ecg  -Acute cholecystitis   -DM     Plan:  -Keep K>4, Mg>2. Mag being repleted   Echo demonstrated EF 55-60% and normal wall motion  Surgery went well. Cardiology was sign off. Please call with any concerns.

## 2020-10-14 NOTE — PROGRESS NOTES
Patient alert and oriented. VSS. IVF infusing. Patient ambulating the halls with a steady gait. Pt has not voided this shift. Bladder scan showed over 999 ml. Will notify resident. All surgery sites CDI with no drainage or further s/s of infection. Pt NAYANA insertion side had some red bloody drainage, resident aware and dressing changed. Dilaudid was given for pain. Denies nausea/vomiting. Call light within reach.  Will continue to monitor

## 2020-10-14 NOTE — PLAN OF CARE
Problem: Pain:  Description: Pain management should include both nonpharmacologic and pharmacologic interventions. Goal: Pain level will decrease  Description: Pain level will decrease  Note: Pt complaining of pain post surgery. Dilaudid given per orders. Pt encouraged to call for any pain needs. Call light within reach.  Will continue to monitor

## 2020-10-15 ENCOUNTER — APPOINTMENT (OUTPATIENT)
Dept: GENERAL RADIOLOGY | Age: 60
DRG: 417 | End: 2020-10-15
Payer: COMMERCIAL

## 2020-10-15 LAB
ALBUMIN SERPL-MCNC: 3.1 G/DL (ref 3.4–5)
ALP BLD-CCNC: 281 U/L (ref 40–129)
ALT SERPL-CCNC: 82 U/L (ref 10–40)
ANION GAP SERPL CALCULATED.3IONS-SCNC: 12 MMOL/L (ref 3–16)
AST SERPL-CCNC: 27 U/L (ref 15–37)
BASOPHILS ABSOLUTE: 0.1 K/UL (ref 0–0.2)
BASOPHILS RELATIVE PERCENT: 1.1 %
BILIRUB SERPL-MCNC: 1.7 MG/DL (ref 0–1)
BILIRUBIN DIRECT: 0.4 MG/DL (ref 0–0.3)
BILIRUBIN, INDIRECT: 1.3 MG/DL (ref 0–1)
BUN BLDV-MCNC: 10 MG/DL (ref 7–20)
CALCIUM SERPL-MCNC: 9.3 MG/DL (ref 8.3–10.6)
CHLORIDE BLD-SCNC: 100 MMOL/L (ref 99–110)
CO2: 23 MMOL/L (ref 21–32)
CREAT SERPL-MCNC: 1 MG/DL (ref 0.8–1.3)
EOSINOPHILS ABSOLUTE: 0.3 K/UL (ref 0–0.6)
EOSINOPHILS RELATIVE PERCENT: 2.9 %
GFR AFRICAN AMERICAN: >60
GFR NON-AFRICAN AMERICAN: >60
GLUCOSE BLD-MCNC: 200 MG/DL (ref 70–99)
GLUCOSE BLD-MCNC: 200 MG/DL (ref 70–99)
GLUCOSE BLD-MCNC: 218 MG/DL (ref 70–99)
GLUCOSE BLD-MCNC: 220 MG/DL (ref 70–99)
GLUCOSE BLD-MCNC: 270 MG/DL (ref 70–99)
HCT VFR BLD CALC: 41.2 % (ref 40.5–52.5)
HEMOGLOBIN: 13.6 G/DL (ref 13.5–17.5)
LYMPHOCYTES ABSOLUTE: 1.9 K/UL (ref 1–5.1)
LYMPHOCYTES RELATIVE PERCENT: 19.3 %
MAGNESIUM: 1.9 MG/DL (ref 1.8–2.4)
MCH RBC QN AUTO: 30.6 PG (ref 26–34)
MCHC RBC AUTO-ENTMCNC: 33 G/DL (ref 31–36)
MCV RBC AUTO: 92.6 FL (ref 80–100)
MONOCYTES ABSOLUTE: 0.9 K/UL (ref 0–1.3)
MONOCYTES RELATIVE PERCENT: 8.9 %
NEUTROPHILS ABSOLUTE: 6.7 K/UL (ref 1.7–7.7)
NEUTROPHILS RELATIVE PERCENT: 67.8 %
PDW BLD-RTO: 12.6 % (ref 12.4–15.4)
PERFORMED ON: ABNORMAL
PHOSPHORUS: 3.1 MG/DL (ref 2.5–4.9)
PLATELET # BLD: 271 K/UL (ref 135–450)
PMV BLD AUTO: 8.4 FL (ref 5–10.5)
POTASSIUM SERPL-SCNC: 4.6 MMOL/L (ref 3.5–5.1)
RBC # BLD: 4.45 M/UL (ref 4.2–5.9)
SODIUM BLD-SCNC: 135 MMOL/L (ref 136–145)
TOTAL PROTEIN: 6.5 G/DL (ref 6.4–8.2)
WBC # BLD: 9.8 K/UL (ref 4–11)

## 2020-10-15 PROCEDURE — 6370000000 HC RX 637 (ALT 250 FOR IP): Performed by: STUDENT IN AN ORGANIZED HEALTH CARE EDUCATION/TRAINING PROGRAM

## 2020-10-15 PROCEDURE — 6360000002 HC RX W HCPCS: Performed by: STUDENT IN AN ORGANIZED HEALTH CARE EDUCATION/TRAINING PROGRAM

## 2020-10-15 PROCEDURE — 2580000003 HC RX 258: Performed by: STUDENT IN AN ORGANIZED HEALTH CARE EDUCATION/TRAINING PROGRAM

## 2020-10-15 PROCEDURE — 6370000000 HC RX 637 (ALT 250 FOR IP): Performed by: NURSE PRACTITIONER

## 2020-10-15 PROCEDURE — 87205 SMEAR GRAM STAIN: CPT

## 2020-10-15 PROCEDURE — 87070 CULTURE OTHR SPECIMN AEROBIC: CPT

## 2020-10-15 PROCEDURE — 6370000000 HC RX 637 (ALT 250 FOR IP): Performed by: SURGERY

## 2020-10-15 PROCEDURE — 83735 ASSAY OF MAGNESIUM: CPT

## 2020-10-15 PROCEDURE — 6370000000 HC RX 637 (ALT 250 FOR IP): Performed by: INTERNAL MEDICINE

## 2020-10-15 PROCEDURE — 36415 COLL VENOUS BLD VENIPUNCTURE: CPT

## 2020-10-15 PROCEDURE — 80076 HEPATIC FUNCTION PANEL: CPT

## 2020-10-15 PROCEDURE — 2580000003 HC RX 258: Performed by: SURGERY

## 2020-10-15 PROCEDURE — 1200000000 HC SEMI PRIVATE

## 2020-10-15 PROCEDURE — 85025 COMPLETE CBC W/AUTO DIFF WBC: CPT

## 2020-10-15 PROCEDURE — 6360000002 HC RX W HCPCS: Performed by: NURSE PRACTITIONER

## 2020-10-15 PROCEDURE — 74018 RADEX ABDOMEN 1 VIEW: CPT

## 2020-10-15 PROCEDURE — 80069 RENAL FUNCTION PANEL: CPT

## 2020-10-15 RX ORDER — PSEUDOEPHEDRINE HCL 30 MG
100 TABLET ORAL 2 TIMES DAILY
Qty: 60 CAPSULE | Refills: 0 | Status: SHIPPED | OUTPATIENT
Start: 2020-10-15

## 2020-10-15 RX ORDER — METHOCARBAMOL 750 MG/1
750 TABLET, FILM COATED ORAL 4 TIMES DAILY
Qty: 40 TABLET | Refills: 0 | Status: SHIPPED | OUTPATIENT
Start: 2020-10-15 | End: 2020-10-25

## 2020-10-15 RX ORDER — METOCLOPRAMIDE HYDROCHLORIDE 5 MG/ML
10 INJECTION INTRAMUSCULAR; INTRAVENOUS EVERY 6 HOURS
Status: DISCONTINUED | OUTPATIENT
Start: 2020-10-15 | End: 2020-10-20 | Stop reason: HOSPADM

## 2020-10-15 RX ORDER — AMOXICILLIN AND CLAVULANATE POTASSIUM 875; 125 MG/1; MG/1
1 TABLET, FILM COATED ORAL 2 TIMES DAILY
Qty: 14 TABLET | Refills: 0 | Status: SHIPPED | OUTPATIENT
Start: 2020-10-15 | End: 2020-10-20 | Stop reason: HOSPADM

## 2020-10-15 RX ORDER — BISACODYL 10 MG
10 SUPPOSITORY, RECTAL RECTAL ONCE
Status: COMPLETED | OUTPATIENT
Start: 2020-10-15 | End: 2020-10-15

## 2020-10-15 RX ORDER — SODIUM CHLORIDE, SODIUM LACTATE, POTASSIUM CHLORIDE, AND CALCIUM CHLORIDE .6; .31; .03; .02 G/100ML; G/100ML; G/100ML; G/100ML
500 INJECTION, SOLUTION INTRAVENOUS ONCE
Status: COMPLETED | OUTPATIENT
Start: 2020-10-15 | End: 2020-10-15

## 2020-10-15 RX ORDER — OXYCODONE HYDROCHLORIDE 5 MG/1
10 TABLET ORAL EVERY 4 HOURS PRN
Status: DISCONTINUED | OUTPATIENT
Start: 2020-10-15 | End: 2020-10-20 | Stop reason: HOSPADM

## 2020-10-15 RX ORDER — DOCUSATE SODIUM 100 MG/1
100 CAPSULE, LIQUID FILLED ORAL DAILY
Status: DISCONTINUED | OUTPATIENT
Start: 2020-10-15 | End: 2020-10-20 | Stop reason: HOSPADM

## 2020-10-15 RX ORDER — TAMSULOSIN HYDROCHLORIDE 0.4 MG/1
0.4 CAPSULE ORAL DAILY
Qty: 30 CAPSULE | Refills: 0 | Status: SHIPPED | OUTPATIENT
Start: 2020-10-16 | End: 2020-10-27

## 2020-10-15 RX ORDER — OXYCODONE HYDROCHLORIDE 5 MG/1
5 TABLET ORAL EVERY 4 HOURS PRN
Status: DISCONTINUED | OUTPATIENT
Start: 2020-10-15 | End: 2020-10-20 | Stop reason: HOSPADM

## 2020-10-15 RX ORDER — METHOCARBAMOL 500 MG/1
750 TABLET, FILM COATED ORAL 4 TIMES DAILY
Status: DISCONTINUED | OUTPATIENT
Start: 2020-10-15 | End: 2020-10-20 | Stop reason: HOSPADM

## 2020-10-15 RX ORDER — OXYCODONE HYDROCHLORIDE 5 MG/1
10 TABLET ORAL EVERY 4 HOURS PRN
Status: DISCONTINUED | OUTPATIENT
Start: 2020-10-15 | End: 2020-10-15 | Stop reason: SDUPTHER

## 2020-10-15 RX ORDER — PROMETHAZINE HYDROCHLORIDE 25 MG/ML
25 INJECTION, SOLUTION INTRAMUSCULAR; INTRAVENOUS ONCE
Status: COMPLETED | OUTPATIENT
Start: 2020-10-15 | End: 2020-10-15

## 2020-10-15 RX ORDER — POLYETHYLENE GLYCOL 3350 17 G/17G
17 POWDER, FOR SOLUTION ORAL DAILY
Status: DISCONTINUED | OUTPATIENT
Start: 2020-10-15 | End: 2020-10-20 | Stop reason: HOSPADM

## 2020-10-15 RX ORDER — OXYCODONE HYDROCHLORIDE AND ACETAMINOPHEN 5; 325 MG/1; MG/1
1 TABLET ORAL EVERY 6 HOURS PRN
Qty: 28 TABLET | Refills: 0 | Status: SHIPPED | OUTPATIENT
Start: 2020-10-15 | End: 2020-10-22

## 2020-10-15 RX ORDER — OXYCODONE HYDROCHLORIDE 5 MG/1
5 TABLET ORAL EVERY 4 HOURS PRN
Status: DISCONTINUED | OUTPATIENT
Start: 2020-10-15 | End: 2020-10-15 | Stop reason: SDUPTHER

## 2020-10-15 RX ORDER — SENNA PLUS 8.6 MG/1
1 TABLET ORAL NIGHTLY
Status: DISCONTINUED | OUTPATIENT
Start: 2020-10-15 | End: 2020-10-20 | Stop reason: HOSPADM

## 2020-10-15 RX ADMIN — SODIUM CHLORIDE, POTASSIUM CHLORIDE, SODIUM LACTATE AND CALCIUM CHLORIDE 500 ML: 600; 310; 30; 20 INJECTION, SOLUTION INTRAVENOUS at 19:48

## 2020-10-15 RX ADMIN — INSULIN LISPRO 6 UNITS: 100 INJECTION, SOLUTION INTRAVENOUS; SUBCUTANEOUS at 14:23

## 2020-10-15 RX ADMIN — INSULIN LISPRO 4 UNITS: 100 INJECTION, SOLUTION INTRAVENOUS; SUBCUTANEOUS at 09:12

## 2020-10-15 RX ADMIN — OXYCODONE 10 MG: 5 TABLET ORAL at 09:35

## 2020-10-15 RX ADMIN — DOCUSATE SODIUM 100 MG: 100 CAPSULE, LIQUID FILLED ORAL at 09:27

## 2020-10-15 RX ADMIN — Medication 10 ML: at 20:09

## 2020-10-15 RX ADMIN — HYDROMORPHONE HYDROCHLORIDE 0.5 MG: 1 INJECTION, SOLUTION INTRAMUSCULAR; INTRAVENOUS; SUBCUTANEOUS at 16:51

## 2020-10-15 RX ADMIN — POLYETHYLENE GLYCOL (3350) 17 G: 17 POWDER, FOR SOLUTION ORAL at 14:35

## 2020-10-15 RX ADMIN — INSULIN GLARGINE 5 UNITS: 100 INJECTION, SOLUTION SUBCUTANEOUS at 19:54

## 2020-10-15 RX ADMIN — HYDROMORPHONE HYDROCHLORIDE 0.5 MG: 1 INJECTION, SOLUTION INTRAMUSCULAR; INTRAVENOUS; SUBCUTANEOUS at 07:28

## 2020-10-15 RX ADMIN — PROMETHAZINE HYDROCHLORIDE 25 MG: 25 INJECTION INTRAMUSCULAR; INTRAVENOUS at 23:16

## 2020-10-15 RX ADMIN — PIPERACILLIN AND TAZOBACTAM 3.38 G: 3; .375 INJECTION, POWDER, LYOPHILIZED, FOR SOLUTION INTRAVENOUS at 09:15

## 2020-10-15 RX ADMIN — PIPERACILLIN AND TAZOBACTAM 3.38 G: 3; .375 INJECTION, POWDER, LYOPHILIZED, FOR SOLUTION INTRAVENOUS at 01:36

## 2020-10-15 RX ADMIN — PIPERACILLIN AND TAZOBACTAM 3.38 G: 3; .375 INJECTION, POWDER, LYOPHILIZED, FOR SOLUTION INTRAVENOUS at 17:00

## 2020-10-15 RX ADMIN — INSULIN LISPRO 4 UNITS: 100 INJECTION, SOLUTION INTRAVENOUS; SUBCUTANEOUS at 17:58

## 2020-10-15 RX ADMIN — INSULIN LISPRO 2 UNITS: 100 INJECTION, SOLUTION INTRAVENOUS; SUBCUTANEOUS at 19:54

## 2020-10-15 RX ADMIN — TAMSULOSIN HYDROCHLORIDE 0.4 MG: 0.4 CAPSULE ORAL at 09:15

## 2020-10-15 RX ADMIN — ENOXAPARIN SODIUM 40 MG: 40 INJECTION SUBCUTANEOUS at 09:15

## 2020-10-15 RX ADMIN — SODIUM CHLORIDE, POTASSIUM CHLORIDE, SODIUM LACTATE AND CALCIUM CHLORIDE: 600; 310; 30; 20 INJECTION, SOLUTION INTRAVENOUS at 01:36

## 2020-10-15 RX ADMIN — HYDROMORPHONE HYDROCHLORIDE 0.5 MG: 1 INJECTION, SOLUTION INTRAMUSCULAR; INTRAVENOUS; SUBCUTANEOUS at 01:35

## 2020-10-15 RX ADMIN — METHOCARBAMOL TABLETS 750 MG: 500 TABLET, COATED ORAL at 09:14

## 2020-10-15 RX ADMIN — INSULIN LISPRO 4 UNITS: 100 INJECTION, SOLUTION INTRAVENOUS; SUBCUTANEOUS at 09:13

## 2020-10-15 RX ADMIN — HYDROMORPHONE HYDROCHLORIDE 0.5 MG: 1 INJECTION, SOLUTION INTRAMUSCULAR; INTRAVENOUS; SUBCUTANEOUS at 20:05

## 2020-10-15 RX ADMIN — METOCLOPRAMIDE 10 MG: 5 INJECTION, SOLUTION INTRAMUSCULAR; INTRAVENOUS at 19:49

## 2020-10-15 RX ADMIN — METOCLOPRAMIDE 10 MG: 5 INJECTION, SOLUTION INTRAMUSCULAR; INTRAVENOUS at 16:36

## 2020-10-15 RX ADMIN — BISACODYL 10 MG: 10 SUPPOSITORY RECTAL at 20:08

## 2020-10-15 RX ADMIN — ONDANSETRON 4 MG: 2 INJECTION INTRAMUSCULAR; INTRAVENOUS at 20:05

## 2020-10-15 ASSESSMENT — PAIN DESCRIPTION - PROGRESSION
CLINICAL_PROGRESSION: NOT CHANGED
CLINICAL_PROGRESSION: NOT CHANGED

## 2020-10-15 ASSESSMENT — PAIN SCALES - GENERAL
PAINLEVEL_OUTOF10: 10
PAINLEVEL_OUTOF10: 0
PAINLEVEL_OUTOF10: 10
PAINLEVEL_OUTOF10: 0
PAINLEVEL_OUTOF10: 10
PAINLEVEL_OUTOF10: 10
PAINLEVEL_OUTOF10: 0
PAINLEVEL_OUTOF10: 0
PAINLEVEL_OUTOF10: 4
PAINLEVEL_OUTOF10: 10
PAINLEVEL_OUTOF10: 0

## 2020-10-15 ASSESSMENT — PAIN DESCRIPTION - LOCATION
LOCATION: ABDOMEN
LOCATION: ABDOMEN

## 2020-10-15 ASSESSMENT — PAIN - FUNCTIONAL ASSESSMENT
PAIN_FUNCTIONAL_ASSESSMENT: PREVENTS OR INTERFERES SOME ACTIVE ACTIVITIES AND ADLS
PAIN_FUNCTIONAL_ASSESSMENT: PREVENTS OR INTERFERES SOME ACTIVE ACTIVITIES AND ADLS

## 2020-10-15 ASSESSMENT — PAIN DESCRIPTION - PAIN TYPE
TYPE: ACUTE PAIN
TYPE: SURGICAL PAIN

## 2020-10-15 ASSESSMENT — PAIN DESCRIPTION - DESCRIPTORS
DESCRIPTORS: CRAMPING
DESCRIPTORS: DISCOMFORT

## 2020-10-15 ASSESSMENT — ENCOUNTER SYMPTOMS
CONSTIPATION: 1
ABDOMINAL PAIN: 1
RESPIRATORY NEGATIVE: 1
ABDOMINAL DISTENTION: 1

## 2020-10-15 ASSESSMENT — PAIN DESCRIPTION - FREQUENCY
FREQUENCY: CONTINUOUS
FREQUENCY: CONTINUOUS

## 2020-10-15 ASSESSMENT — PAIN DESCRIPTION - ONSET
ONSET: ON-GOING
ONSET: ON-GOING

## 2020-10-15 ASSESSMENT — PAIN DESCRIPTION - ORIENTATION
ORIENTATION: MID;LOWER
ORIENTATION: OTHER (COMMENT)

## 2020-10-15 NOTE — FLOWSHEET NOTE
10/15/20 1530   Encounter Summary   Services provided to: Patient   Referral/Consult From: [facility]   (1015/2020, FLOR[place]ITA. )   Complexity of Encounter Moderate   Length of Encounter 15 minutes   Routine   Type Initial   Assessment Approachable   Intervention Nurtured hope   Outcome Expressed gratitude

## 2020-10-15 NOTE — PROGRESS NOTES
Surgery POC Note    Patient with multiple episodes of emesis. Discussion held, recommended NG tube for possible SBO vs ileus. Patient agreed. Inserted initial part of NG tube, patient removed tube and refused further attempts at this time despite telling patient it would help him progress toward resolution of his pain and possible obstruction sooner. Patient aware that if he keeps vomiting, the overnight team may have to attempt placement this evening. Patient still refusing NG at this time. RN present and aware of plan. Please notify surgery team of continued emesis or worsening abdominal pain/distention. Dr. Evans Barnes will be available this evening.       Fina Lacy DO  PGY2, General Surgery  10/15/20  6:35 PM

## 2020-10-15 NOTE — CARE COORDINATION
Cm following. Pt from home w/brother & plans to return. Advancing diet today. No needs anticipated. Cm spoke w/nurse Mi. She will notify CM if needs arise.

## 2020-10-15 NOTE — PROGRESS NOTES
Hepatobiliary Surgery  Resident Daily Progress Note  Cathlene Schaumann    CC: cholecystoduodenal fistula    Subjective :   Patient rested well overnight, he remains afebrile and HDS. Continues to void appropriately. Admits to surgical pain which improves with medication with gradual daily improved. Admits to an appetite this morning due to resolved bloating. Drain still with scant output. Objective    Infusions:   lactated ringers 75 mL/hr at 10/15/20 0136    dextrose          I/O:I/O last 3 completed shifts: In: 1390 [P.O.:720; I.V.:655]  Out: 2525 [Urine:2525]           Wt Readings from Last 1 Encounters:   10/08/20 178 lb (80.7 kg)                 LABS:   Recent Labs     10/13/20  0509 10/14/20  0445   WBC 7.7 10.1   HGB 12.7* 12.8*   HCT 38.7* 38.4*   MCV 92.3 92.7    263        Recent Labs     10/13/20  0509 10/14/20  0445    136   K 4.2 4.5    101   CO2 23 24   PHOS 3.6 2.9   BUN 13 16   CREATININE 1.3 1.3        Recent Labs     10/13/20  0509 10/14/20  0445   AST 39* 56*   * 116*   BILIDIR 0.3 0.3   BILITOT 1.1* 1.4*   ALKPHOS 396* 302*        No results for input(s): LIPASE, AMYLASE in the last 72 hours. Recent Labs     10/13/20  0509 10/14/20  0445   PROT 5.9* 5.7*        No results for input(s): CKTOTAL, CKMB, CKMBINDEX, TROPONINI in the last 72 hours.          Exam:  /74   Pulse 94   Temp 98.7 °F (37.1 °C) (Oral)   Resp 16   Ht 6' (1.829 m)   Wt 178 lb (80.7 kg)   SpO2 94%   BMI 24.14 kg/m²     CONSTITUTIONAL:  awake, alert & oriented x 3, cooperative, no apparent distress, and appears stated age  HEENT: No palpable lymphadenopathy, no icterus  LUNGS: No increased work of breathing, clear to auscultation bilaterally, no crackles or wheezing  CV:  Regular rate and rhythm, normal S1 and S2, and no murmur noted  ABDOMEN: Soft,  Appropriately tender, non-distended, incisions c/d/i with surgical glue in place, drain with minimal output    ASSESSMENT/PLAN:   Pt. is

## 2020-10-15 NOTE — PROGRESS NOTES
Progress Note    Admit Date: 10/8/2020  Diet: DIET GENERAL; Carb Control: 3 carb choices (45 gms)/meal    CC: Abdominal pain, N/V    Interval history: Patient doing well this a.m. Still complaining of constipation. Has not been passing gas or had a bowel movement. KUB showed small and large bowel dilation with possible SBO. Think SBO is unlikely and it is more likely post-op ileus. Will await surgery recs. - Tolerating CLD well, advance as tolerated   - Will DC on Augmentin  - Likely to DC today        Medications:     Scheduled Meds:   methocarbamol  750 mg Oral 4x Daily    tamsulosin  0.4 mg Oral Daily    insulin lispro  0-12 Units Subcutaneous TID WC    insulin lispro  0-6 Units Subcutaneous Nightly    insulin lispro  4 Units Subcutaneous TID WC    insulin glargine  5 Units Subcutaneous Nightly    sodium chloride flush  10 mL Intravenous 2 times per day    enoxaparin  40 mg Subcutaneous Daily    influenza virus vaccine  0.5 mL Intramuscular Once    piperacillin-tazobactam  3.375 g Intravenous Q8H     Continuous Infusions:   lactated ringers 75 mL/hr at 10/15/20 0136    dextrose       PRN Meds:oxyCODONE **OR** oxyCODONE, HYDROmorphone **OR** HYDROmorphone, sodium chloride flush, promethazine **OR** ondansetron, glucose, dextrose, glucagon (rDNA), dextrose, dextrose    Objective:   Vitals:   T-max:  Patient Vitals for the past 8 hrs:   BP Temp Temp src Pulse Resp SpO2   10/15/20 0400 108/74 98.7 °F (37.1 °C) Oral 94 16 94 %       Intake/Output Summary (Last 24 hours) at 10/15/2020 0820  Last data filed at 10/15/2020 0500  Gross per 24 hour   Intake 2341.39 ml   Output 2450 ml   Net -108.61 ml       Review of Systems   Constitutional: Negative. HENT: Negative. Respiratory: Negative. Cardiovascular: Negative. Gastrointestinal: Positive for abdominal distention, abdominal pain and constipation. Genitourinary: Negative. Musculoskeletal: Negative. Skin: Negative.     Neurological: Negative. Hematological: Negative. Psychiatric/Behavioral: Negative. Physical Exam  Constitutional:       General: He is not in acute distress. Appearance: Normal appearance. Cardiovascular:      Rate and Rhythm: Normal rate and regular rhythm. Pulses: Normal pulses. Heart sounds: Normal heart sounds. Pulmonary:      Effort: Pulmonary effort is normal.      Breath sounds: Normal breath sounds. Abdominal:      General: Bowel sounds are normal. There is distension. Tenderness: There is abdominal tenderness. Comments: Tender to palpation throughout abdomen   Musculoskeletal: Normal range of motion. Skin:     General: Skin is warm. Capillary Refill: Capillary refill takes less than 2 seconds. Neurological:      General: No focal deficit present. Mental Status: He is alert. Mental status is at baseline. Psychiatric:         Mood and Affect: Mood normal.         LABS:    CBC:   Recent Labs     10/13/20  0509 10/14/20  0445   WBC 7.7 10.1   HGB 12.7* 12.8*   HCT 38.7* 38.4*    263   MCV 92.3 92.7     Renal:    Recent Labs     10/13/20  0509 10/14/20  0445    136   K 4.2 4.5    101   CO2 23 24   BUN 13 16   CREATININE 1.3 1.3   GLUCOSE 119* 172*   CALCIUM 9.1 8.9   MG 2.00 1.60*   PHOS 3.6 2.9   ANIONGAP 10 11     Hepatic:   Recent Labs     10/13/20  0509 10/14/20  0445   AST 39* 56*   * 116*   BILITOT 1.1* 1.4*   BILIDIR 0.3 0.3   PROT 5.9* 5.7*   LABALBU 3.7 3.2*   ALKPHOS 396* 302*     Troponin: No results for input(s): TROPONINI in the last 72 hours. BNP: No results for input(s): BNP in the last 72 hours. Lipids: No results for input(s): CHOL, HDL in the last 72 hours. Invalid input(s): LDLCALCU, TRIGLYCERIDE  ABGs:  No results for input(s): PHART, BKL0UCW, PO2ART, CAR6BTP, BEART, THGBART, J5EXVNHM, VEH5TAZ in the last 72 hours. INR: No results for input(s): INR in the last 72 hours.   Lactate: No results for input(s): LACTATE in RUQ-US (10/09): No intrahepatic biliary duct dilation. CBD normal in diameter. Sonographic Layton's sign negative. - ERCP (10/09): CBD filling defect c/w choledocholithiasis. Mild CBD dilation.   - Plan: Zosyn 3.375 mg QD until 10/23. Cholecystectomy/cholangiogram on 10/13      T2DM  - Had taken metformin prior to admission. -.Add 5 units lantus qPM. LDSSI.  Added 4 units with meals.  Repeat A1c 9.0      DVT PPX- Lovenox 40 mg QD.   Diet- Advance as tolerated, FLD this AM   Disposition- D/C to home after cholecystectomy.  Leann Hollins MD, PGY-1  10/15/20  8:20 AM    This patient has been staffed and discussed with Marsha Ochoa MD.

## 2020-10-16 ENCOUNTER — APPOINTMENT (OUTPATIENT)
Dept: GENERAL RADIOLOGY | Age: 60
DRG: 417 | End: 2020-10-16
Payer: COMMERCIAL

## 2020-10-16 LAB
ALBUMIN SERPL-MCNC: 3.1 G/DL (ref 3.4–5)
ALP BLD-CCNC: 249 U/L (ref 40–129)
ALT SERPL-CCNC: 59 U/L (ref 10–40)
ANION GAP SERPL CALCULATED.3IONS-SCNC: 13 MMOL/L (ref 3–16)
AST SERPL-CCNC: 25 U/L (ref 15–37)
BASOPHILS ABSOLUTE: 0 K/UL (ref 0–0.2)
BASOPHILS RELATIVE PERCENT: 0.3 %
BILIRUB SERPL-MCNC: 1.3 MG/DL (ref 0–1)
BILIRUBIN DIRECT: 0.3 MG/DL (ref 0–0.3)
BILIRUBIN, INDIRECT: 1 MG/DL (ref 0–1)
BUN BLDV-MCNC: 10 MG/DL (ref 7–20)
CALCIUM SERPL-MCNC: 9.6 MG/DL (ref 8.3–10.6)
CHLORIDE BLD-SCNC: 97 MMOL/L (ref 99–110)
CO2: 24 MMOL/L (ref 21–32)
CREAT SERPL-MCNC: 1.1 MG/DL (ref 0.8–1.3)
EOSINOPHILS ABSOLUTE: 0.1 K/UL (ref 0–0.6)
EOSINOPHILS RELATIVE PERCENT: 0.8 %
GFR AFRICAN AMERICAN: >60
GFR NON-AFRICAN AMERICAN: >60
GLUCOSE BLD-MCNC: 176 MG/DL (ref 70–99)
GLUCOSE BLD-MCNC: 195 MG/DL (ref 70–99)
GLUCOSE BLD-MCNC: 224 MG/DL (ref 70–99)
GLUCOSE BLD-MCNC: 227 MG/DL (ref 70–99)
HCT VFR BLD CALC: 41 % (ref 40.5–52.5)
HEMOGLOBIN: 13.4 G/DL (ref 13.5–17.5)
LYMPHOCYTES ABSOLUTE: 1.2 K/UL (ref 1–5.1)
LYMPHOCYTES RELATIVE PERCENT: 9.1 %
MAGNESIUM: 1.8 MG/DL (ref 1.8–2.4)
MCH RBC QN AUTO: 30.8 PG (ref 26–34)
MCHC RBC AUTO-ENTMCNC: 32.7 G/DL (ref 31–36)
MCV RBC AUTO: 94.3 FL (ref 80–100)
MONOCYTES ABSOLUTE: 1 K/UL (ref 0–1.3)
MONOCYTES RELATIVE PERCENT: 7.9 %
NEUTROPHILS ABSOLUTE: 10.4 K/UL (ref 1.7–7.7)
NEUTROPHILS RELATIVE PERCENT: 81.9 %
PDW BLD-RTO: 12.8 % (ref 12.4–15.4)
PERFORMED ON: ABNORMAL
PHOSPHORUS: 4.5 MG/DL (ref 2.5–4.9)
PLATELET # BLD: 305 K/UL (ref 135–450)
PMV BLD AUTO: 8.3 FL (ref 5–10.5)
POTASSIUM SERPL-SCNC: 4.6 MMOL/L (ref 3.5–5.1)
POTASSIUM SERPL-SCNC: 5.9 MMOL/L (ref 3.5–5.1)
RBC # BLD: 4.35 M/UL (ref 4.2–5.9)
SODIUM BLD-SCNC: 134 MMOL/L (ref 136–145)
TOTAL PROTEIN: 6.4 G/DL (ref 6.4–8.2)
WBC # BLD: 12.7 K/UL (ref 4–11)

## 2020-10-16 PROCEDURE — 6360000002 HC RX W HCPCS: Performed by: STUDENT IN AN ORGANIZED HEALTH CARE EDUCATION/TRAINING PROGRAM

## 2020-10-16 PROCEDURE — 2580000003 HC RX 258: Performed by: STUDENT IN AN ORGANIZED HEALTH CARE EDUCATION/TRAINING PROGRAM

## 2020-10-16 PROCEDURE — 83735 ASSAY OF MAGNESIUM: CPT

## 2020-10-16 PROCEDURE — 80069 RENAL FUNCTION PANEL: CPT

## 2020-10-16 PROCEDURE — 6370000000 HC RX 637 (ALT 250 FOR IP): Performed by: SURGERY

## 2020-10-16 PROCEDURE — 80076 HEPATIC FUNCTION PANEL: CPT

## 2020-10-16 PROCEDURE — 84132 ASSAY OF SERUM POTASSIUM: CPT

## 2020-10-16 PROCEDURE — 1200000000 HC SEMI PRIVATE

## 2020-10-16 PROCEDURE — 85025 COMPLETE CBC W/AUTO DIFF WBC: CPT

## 2020-10-16 PROCEDURE — 6360000002 HC RX W HCPCS: Performed by: NURSE PRACTITIONER

## 2020-10-16 PROCEDURE — 74018 RADEX ABDOMEN 1 VIEW: CPT

## 2020-10-16 PROCEDURE — 6370000000 HC RX 637 (ALT 250 FOR IP): Performed by: STUDENT IN AN ORGANIZED HEALTH CARE EDUCATION/TRAINING PROGRAM

## 2020-10-16 PROCEDURE — 36415 COLL VENOUS BLD VENIPUNCTURE: CPT

## 2020-10-16 RX ORDER — MAGNESIUM SULFATE IN WATER 40 MG/ML
2 INJECTION, SOLUTION INTRAVENOUS ONCE
Status: COMPLETED | OUTPATIENT
Start: 2020-10-16 | End: 2020-10-16

## 2020-10-16 RX ORDER — SODIUM CHLORIDE 9 MG/ML
INJECTION, SOLUTION INTRAVENOUS CONTINUOUS
Status: DISCONTINUED | OUTPATIENT
Start: 2020-10-16 | End: 2020-10-19

## 2020-10-16 RX ADMIN — OXYCODONE 10 MG: 5 TABLET ORAL at 23:41

## 2020-10-16 RX ADMIN — METHOCARBAMOL TABLETS 750 MG: 500 TABLET, COATED ORAL at 20:38

## 2020-10-16 RX ADMIN — PIPERACILLIN AND TAZOBACTAM 3.38 G: 3; .375 INJECTION, POWDER, LYOPHILIZED, FOR SOLUTION INTRAVENOUS at 01:01

## 2020-10-16 RX ADMIN — PIPERACILLIN AND TAZOBACTAM 3.38 G: 3; .375 INJECTION, POWDER, LYOPHILIZED, FOR SOLUTION INTRAVENOUS at 10:16

## 2020-10-16 RX ADMIN — ONDANSETRON 4 MG: 2 INJECTION INTRAMUSCULAR; INTRAVENOUS at 03:06

## 2020-10-16 RX ADMIN — Medication 10 ML: at 20:44

## 2020-10-16 RX ADMIN — INSULIN LISPRO 4 UNITS: 100 INJECTION, SOLUTION INTRAVENOUS; SUBCUTANEOUS at 10:15

## 2020-10-16 RX ADMIN — SODIUM CHLORIDE: 9 INJECTION, SOLUTION INTRAVENOUS at 15:02

## 2020-10-16 RX ADMIN — HYDROMORPHONE HYDROCHLORIDE 0.5 MG: 1 INJECTION, SOLUTION INTRAMUSCULAR; INTRAVENOUS; SUBCUTANEOUS at 15:25

## 2020-10-16 RX ADMIN — STANDARDIZED SENNA CONCENTRATE 8.6 MG: 8.6 TABLET ORAL at 20:38

## 2020-10-16 RX ADMIN — PIPERACILLIN AND TAZOBACTAM 3.38 G: 3; .375 INJECTION, POWDER, LYOPHILIZED, FOR SOLUTION INTRAVENOUS at 17:49

## 2020-10-16 RX ADMIN — SODIUM CHLORIDE: 9 INJECTION, SOLUTION INTRAVENOUS at 12:32

## 2020-10-16 RX ADMIN — MAGNESIUM SULFATE HEPTAHYDRATE 2 G: 40 INJECTION, SOLUTION INTRAVENOUS at 15:50

## 2020-10-16 RX ADMIN — METOCLOPRAMIDE 10 MG: 5 INJECTION, SOLUTION INTRAMUSCULAR; INTRAVENOUS at 09:06

## 2020-10-16 RX ADMIN — METOCLOPRAMIDE 10 MG: 5 INJECTION, SOLUTION INTRAMUSCULAR; INTRAVENOUS at 20:38

## 2020-10-16 RX ADMIN — METHOCARBAMOL TABLETS 750 MG: 500 TABLET, COATED ORAL at 17:49

## 2020-10-16 RX ADMIN — INSULIN LISPRO 1 UNITS: 100 INJECTION, SOLUTION INTRAVENOUS; SUBCUTANEOUS at 20:38

## 2020-10-16 RX ADMIN — METOCLOPRAMIDE 10 MG: 5 INJECTION, SOLUTION INTRAMUSCULAR; INTRAVENOUS at 03:06

## 2020-10-16 RX ADMIN — METOCLOPRAMIDE 10 MG: 5 INJECTION, SOLUTION INTRAMUSCULAR; INTRAVENOUS at 15:14

## 2020-10-16 RX ADMIN — HYDROMORPHONE HYDROCHLORIDE 0.5 MG: 1 INJECTION, SOLUTION INTRAMUSCULAR; INTRAVENOUS; SUBCUTANEOUS at 03:07

## 2020-10-16 ASSESSMENT — ENCOUNTER SYMPTOMS
RESPIRATORY NEGATIVE: 1
VOMITING: 1
ABDOMINAL PAIN: 1
NAUSEA: 1

## 2020-10-16 ASSESSMENT — PAIN DESCRIPTION - PAIN TYPE
TYPE: ACUTE PAIN
TYPE: ACUTE PAIN

## 2020-10-16 ASSESSMENT — PAIN DESCRIPTION - LOCATION
LOCATION: ABDOMEN
LOCATION: ABDOMEN

## 2020-10-16 ASSESSMENT — PAIN DESCRIPTION - DESCRIPTORS
DESCRIPTORS: DISCOMFORT
DESCRIPTORS: DISCOMFORT

## 2020-10-16 ASSESSMENT — PAIN DESCRIPTION - ORIENTATION
ORIENTATION: MID;LOWER
ORIENTATION: MID;LOWER

## 2020-10-16 ASSESSMENT — PAIN DESCRIPTION - PROGRESSION
CLINICAL_PROGRESSION: NOT CHANGED
CLINICAL_PROGRESSION: NOT CHANGED

## 2020-10-16 ASSESSMENT — PAIN DESCRIPTION - ONSET
ONSET: ON-GOING
ONSET: ON-GOING

## 2020-10-16 ASSESSMENT — PAIN SCALES - GENERAL
PAINLEVEL_OUTOF10: 0
PAINLEVEL_OUTOF10: 0
PAINLEVEL_OUTOF10: 10
PAINLEVEL_OUTOF10: 0

## 2020-10-16 ASSESSMENT — PAIN DESCRIPTION - FREQUENCY
FREQUENCY: CONTINUOUS
FREQUENCY: CONTINUOUS

## 2020-10-16 ASSESSMENT — PAIN - FUNCTIONAL ASSESSMENT: PAIN_FUNCTIONAL_ASSESSMENT: PREVENTS OR INTERFERES SOME ACTIVE ACTIVITIES AND ADLS

## 2020-10-16 NOTE — PROGRESS NOTES
Constitutional: Positive for appetite change. HENT: Negative. Respiratory: Negative. Cardiovascular: Negative. Gastrointestinal: Positive for abdominal pain, nausea and vomiting. Genitourinary: Negative. Musculoskeletal: Negative. Neurological: Negative. Physical Exam  Constitutional:       General: He is not in acute distress. Appearance: Normal appearance. He is ill-appearing. Cardiovascular:      Rate and Rhythm: Normal rate and regular rhythm. Pulses: Normal pulses. Heart sounds: Normal heart sounds. Pulmonary:      Effort: Pulmonary effort is normal.      Breath sounds: Normal breath sounds. Abdominal:      General: Abdomen is flat. Bowel sounds are normal.      Palpations: Abdomen is soft. Comments: Drain in place. Incisions C/D/I   Musculoskeletal: Normal range of motion. Skin:     General: Skin is warm. Capillary Refill: Capillary refill takes less than 2 seconds. Neurological:      General: No focal deficit present. Mental Status: He is alert. Mental status is at baseline.    Psychiatric:         Mood and Affect: Mood normal.         LABS:    CBC:   Recent Labs     10/14/20  0445 10/15/20  0835 10/16/20  0433   WBC 10.1 9.8 12.7*   HGB 12.8* 13.6 13.4*   HCT 38.4* 41.2 41.0    271 305   MCV 92.7 92.6 94.3     Renal:    Recent Labs     10/14/20  0445 10/15/20  0835 10/16/20  0433 10/16/20  0712    135* 134*  --    K 4.5 4.6 5.9* 4.6    100 97*  --    CO2 24 23 24  --    BUN 16 10 10  --    CREATININE 1.3 1.0 1.1  --    GLUCOSE 172* 218* 224*  --    CALCIUM 8.9 9.3 9.6  --    MG 1.60* 1.90 1.80  --    PHOS 2.9 3.1 4.5  --    ANIONGAP 11 12 13  --      Hepatic:   Recent Labs     10/14/20  0445 10/15/20  0835 10/16/20  0433   AST 56* 27 25   * 82* 59*   BILITOT 1.4* 1.7* 1.3*   BILIDIR 0.3 0.4* 0.3   PROT 5.7* 6.5 6.4   LABALBU 3.2* 3.1* 3.1*   ALKPHOS 302* 281* 249*     Troponin: No results for input(s): TROPONINI in the last 72 hours. BNP: No results for input(s): BNP in the last 72 hours. Lipids: No results for input(s): CHOL, HDL in the last 72 hours. Invalid input(s): LDLCALCU, TRIGLYCERIDE  ABGs:  No results for input(s): PHART, UAF3HXY, PO2ART, OUL1IPE, BEART, THGBART, O1TCUVTB, JPZ8KNB in the last 72 hours. INR: No results for input(s): INR in the last 72 hours. Lactate: No results for input(s): LACTATE in the last 72 hours. Cultures:  -----------------------------------------------------------------  RAD:   XR ABDOMEN (KUB) (SINGLE AP VIEW)   Final Result      1. Gaseous bowel distention. Bowel dilatation slightly increased from yesterday. Small bowel is slightly more disproportionately dilated. XR ABDOMEN (KUB) (SINGLE AP VIEW)   Final Result   1. There are dilated loops of small bowel. Air and stool present within nondilated colon. The findings may reflect partial small bowel obstruction. This could be better characterized with a contrast-enhanced CT scan of the abdomen and pelvis. The small    bowel dilatation is new from 10/8/2020. MRI ABDOMEN W WO CONTRAST MRCP   Final Result   1. Extensive gallbladder wall thickening with multiple signal void suggesting extensive cholelithiasis and possible impacted stones in the wall the gallbladder. Diffuse thickening with enhancement may reflect adenomyomatosis or chronic inflammation. Gallbladder neoplasm is not excluded. In general the degree of wall thickening appears to be improved in comparison to a previous CT exam from 2019.   2. Wall thickening and suspected inflammatory changes extend into the neck of the gallbladder and results in mild stenosis of the common hepatic duct. This does not result in significant ductal dilation within the liver. Neoplastic abnormality would be    difficult to exclude. 3. No evidence of choledocholithiasis. XR CHEST PORTABLE   Final Result      Bibasilar minimal linear atelectasis.       FL ERCP BILIARY AND PANCREATIC S&I   Final Result      Intraoperative fluoroscopy was performed. Correlate with procedural note for additional information. US ABDOMEN LIMITED   Final Result      Nondiagnostic evaluation of the gallbladder which is obscured. No acute sonographic abnormality of the abdomen. .             CT ABDOMEN PELVIS WO CONTRAST Additional Contrast? None   Final Result      No acute abdominopelvic abnormality. Contracted gallbladder with questionable wall thickening and small nonspecific densities along the fundus. There also may be gallstones. As described on prior report, follow-up MRI/MRCP should be considered. No definite CT evidence of acute    cholecystitis. Prostatomegaly with circumferential bladder wall thickening, which may be secondary to bladder outlet obstruction versus cystitis. Assessment/Plan:     Choledocholithiasis  - CT-AP (10/08): Contracted gallbladder, possible wall thickening. Possible gallstones. - RUQ-US (10/09): No intrahepatic biliary duct dilation. CBD normal in diameter. Sonographic Layton's sign negative. - ERCP (10/09): CBD filling defect c/w choledocholithiasis. Mild CBD dilation.   - Plan: Zosyn 3.375 mg QD until 10/23. Cholecystectomy on 10/13      T2DM  - Had taken metformin prior to admission.   -.Add 10 units lantus qPM. LDSSI.  Added 4 units with meals. Repeat A1c 9.0      DVT PPX- Lovenox 40 mg QD.   Diet-n.p.o.   Disposition- D/C to home after cholecystectomy.  Brice Mayer MD, PGY-1  10/16/20  11:33 AM    This patient has been staffed and discussed with Dat Narvaez MD.

## 2020-10-16 NOTE — PROGRESS NOTES
NUTRITION ASSESSMENT  Admission Date: 10/8/2020     Type and Reason for Visit: Initial(LOS)    NUTRITION RECOMMENDATIONS:   Monitor GI fnx and ability to advance diet as tolerated per surgery team to goal of Low fiber, Low Fat diet    NUTRITION ASSESSMENT:  Pt is at nutritional compromise r/t NPO status. Pt w/ choledocholithiasis s/p ERCP 10/9 with sphincterotomy and stone extraction, s/p 10/13 laparoscopic cholecystectomy with cholecystoduodenal fistula takedown, drainage of liver abscess and EGD. Pt w/ N/V yesterday however pt reports he is feeling better today. States normal PO intake PTA. Noted wt loss per EMR although insignificant per guidelines. Pt reports more activity during work therefore wt loss occurred. Continue to monitor return of GI fnx and ability to advance diet as tolerated per surgery team.      MALNUTRITION ASSESSMENT  Due to current CDC guidelines recommending 6-ft distancing for social isolation for COVID19 prevention, physical aspects of the malnutrition assessment were withheld at this time. Context of Malnutrition: Acute Illness   Malnutrition Status: At risk for malnutrition (Comment)  Findings of the 6 clinical characteristics of malnutrition (Minimum of 2 out of 6 clinical characteristics is required to make the diagnosis of moderate or severe Protein Calorie Malnutrition based on AND/ASPEN Guidelines):  Energy Intake: Less than/equal to 50% of estimated energy requirements    Energy Intake Time: Greater than or equal to 7 days    Weight Loss %: No significant loss   Body Fat Loss: Unable to Assess   Body Fat Location: Unable to assess    Body Muscle Loss: Unable to Assess   Body Muscle Loss Location: Unable to assess    Fluid Accumulation: No significant    Fluid Accumulation Location: No significant     Strength: Not Performed; Not Measured     NUTRITION DIAGNOSIS   Problem: Problem #1: Inadequate oral intake  Etiology:  Altered GI function  Signs & Symptoms: NPO status due to medical condition    NUTRITION INTERVENTION  Food and/or Nutrient Delivery:Continue NPO  Nutrition education/counseling/coordination of care: Continue Inpatient Monitoring     NUTRITION MONITORING & EVALUATION:  Evaluation:Goals set   Goals:Goals: Pt will tolerate diet adv and consume >50% of meals  Monitoring: GI Function , Nutrition Progression  or Weight      OBJECTIVE DATA:  · Nutrition-Focused Physical Findings: BM 10/16  · Wounds Surgical Wound      Past Medical History:   Diagnosis Date    Arthritis     BPH (benign prostatic hyperplasia)     BPH (benign prostatic hypertrophy) 9/2/2015    Clostridium difficile infection 10/24/2016    Diabetes mellitus ([facility] [address] 2012    Elevated LDL cholesterol level 1/27/2015    Essential hypertension, benign 1/27/2015    S/P colonoscopy 2011    Nml per pt'.     Wears glasses         ANTHROPOMETRICS  Current Height: 6' (182.9 cm)  Current Weight: 178 lb (80.7 kg)    Admission weight: 178 lb (80.7 kg) standing scale  Ideal Bodyweight 178 lb   Usual Bodyweight ~185-199   Weight Changes 3.7% loss from UBW       BMI BMI (Calculated): 24.2    Wt Readings from Last 50 Encounters:   10/08/20 178 lb (80.7 kg)   04/08/20 198 lb (89.8 kg)   02/26/20 198 lb 3.2 oz (89.9 kg)   12/08/19 192 lb 7.4 oz (87.3 kg)   11/26/19 192 lb 8 oz (87.3 kg)   10/21/19 188 lb (85.3 kg)   09/11/19 183 lb 3.2 oz (83.1 kg)   09/05/19 184 lb 8 oz (83.7 kg)     COMPARATIVE STANDARDS  Estimated Total Kcals/Day : 25-30  Current Bodyweight (81 kg) 3862-2356 kcal    Estimated Total Protein (g/day) : 1.2-1.4 Current Bodyweight (81 kg) 97-113g/day  Estimated Daily Total Fluid (ml/day): 9275-9602 mL per day     Food / Nutrition-Related History  Pre-Admission / Home Diet:  Pre-Admission/Home Diet: General   Home Supplements / Herbals:    none noted  Food Restrictions / Cultural Requests:    none noted    Current Nutrition Therapies   Diet NPO Effective Now Exceptions are: Ice Chips     PO Intake: NPO  PO Supplement: None   PO Supplement Intake: NPO  IVF: 125 ml/hr     NUTRITION RISK LEVEL: Risk Level: [address], [address], [place]  [place]:  [phone]  Office:  [phone]

## 2020-10-16 NOTE — PROGRESS NOTES
Patient reports ongoing abdominal pain and discomfort throughout RN shift. Pt also has had ongoing nausea and one episode of emesis. Pt states that he has yet to have a BM but is passing flatus. RN educated pt on need for NG. Pt closes eyes during teaching. Will continue to educate and assess patients progress.

## 2020-10-16 NOTE — CARE COORDINATION
This patient has no needs at discharge. Will return home with brother.  Electronically signed by Carlos Chen RN on 10/16/2020 at 4:29 PM

## 2020-10-16 NOTE — PLAN OF CARE
Problem: Pain:  Goal: Control of acute pain  Description: Control of acute pain  Outcome: Ongoing  Note: Pt reports symptoms of pain r/t abdominal discomfort  RN implemented non-pharmacological pain interventions to decrease patients pain level.   After re-assessment patients pain level is 10    See MAR for intervention

## 2020-10-16 NOTE — PROGRESS NOTES
Hepatobiliary Surgery  Resident Daily Progress Note  Mo Patel    CC: cholecystoduodenal fistula    Subjective :   Drain was removed yesterday. In the afternoon, he developed nausea and vomiting. He refused NG tube placement. Morning, he feels much better, especially after having bowel movement. Patient remained afebrile. Objective    Infusions:   lactated ringers 125 mL/hr at 10/15/20 1948    dextrose          I/O:I/O last 3 completed shifts: In: 4071 [P.O.:240; I.V.:1475]  Out: 400 [Urine:350; Emesis/NG output:50]           Wt Readings from Last 1 Encounters:   10/08/20 178 lb (80.7 kg)                 LABS:   Recent Labs     10/15/20  0835 10/16/20  0433   WBC 9.8 12.7*   HGB 13.6 13.4*   HCT 41.2 41.0   MCV 92.6 94.3    305        Recent Labs     10/15/20  0835 10/16/20  0433   * 134*   K 4.6 5.9*    97*   CO2 23 24   PHOS 3.1 4.5   BUN 10 10   CREATININE 1.0 1.1        Recent Labs     10/15/20  0835 10/16/20  0433   AST 27 25   ALT 82* 59*   BILIDIR 0.4* 0.3   BILITOT 1.7* 1.3*   ALKPHOS 281* 249*        No results for input(s): LIPASE, AMYLASE in the last 72 hours. Recent Labs     10/15/20  0835 10/16/20  0433   PROT 6.5 6.4        No results for input(s): CKTOTAL, CKMB, CKMBINDEX, TROPONINI in the last 72 hours.          Exam:  BP (!) 158/94   Pulse 103   Temp 98.2 °F (36.8 °C) (Oral)   Resp 20   Ht 6' (1.829 m)   Wt 178 lb (80.7 kg)   SpO2 93%   BMI 24.14 kg/m²     CONSTITUTIONAL:  awake, alert & oriented x 3, cooperative, no apparent distress, and appears stated age  HEENT: No palpable lymphadenopathy, no icterus  LUNGS: No increased work of breathing  CV:  Regular rate and rhythm  ABDOMEN: Soft,  Appropriately tender, non-distended, incisions c/d/i with surgical glue in place, drain with minimal output    ASSESSMENT/PLAN:   Pt. is a 56 y.o. male with choledocholithiasis s/p ERCP 10/9 with sphincterotomy and stone extraction, POD#3 from a robotic assisted

## 2020-10-17 LAB
ALBUMIN SERPL-MCNC: 3.1 G/DL (ref 3.4–5)
ALP BLD-CCNC: 206 U/L (ref 40–129)
ALT SERPL-CCNC: 40 U/L (ref 10–40)
ANION GAP SERPL CALCULATED.3IONS-SCNC: 11 MMOL/L (ref 3–16)
AST SERPL-CCNC: 12 U/L (ref 15–37)
BASOPHILS ABSOLUTE: 0 K/UL (ref 0–0.2)
BASOPHILS RELATIVE PERCENT: 0.4 %
BILIRUB SERPL-MCNC: 1.2 MG/DL (ref 0–1)
BILIRUBIN DIRECT: 0.3 MG/DL (ref 0–0.3)
BILIRUBIN, INDIRECT: 0.9 MG/DL (ref 0–1)
BUN BLDV-MCNC: 12 MG/DL (ref 7–20)
CALCIUM SERPL-MCNC: 9.1 MG/DL (ref 8.3–10.6)
CHLORIDE BLD-SCNC: 101 MMOL/L (ref 99–110)
CO2: 27 MMOL/L (ref 21–32)
CREAT SERPL-MCNC: 1.2 MG/DL (ref 0.8–1.3)
EOSINOPHILS ABSOLUTE: 0.4 K/UL (ref 0–0.6)
EOSINOPHILS RELATIVE PERCENT: 4.1 %
GFR AFRICAN AMERICAN: >60
GFR NON-AFRICAN AMERICAN: >60
GLUCOSE BLD-MCNC: 102 MG/DL (ref 70–99)
GLUCOSE BLD-MCNC: 144 MG/DL (ref 70–99)
GLUCOSE BLD-MCNC: 172 MG/DL (ref 70–99)
GLUCOSE BLD-MCNC: 207 MG/DL (ref 70–99)
GLUCOSE BLD-MCNC: 93 MG/DL (ref 70–99)
HCT VFR BLD CALC: 36.7 % (ref 40.5–52.5)
HEMOGLOBIN: 12.2 G/DL (ref 13.5–17.5)
LYMPHOCYTES ABSOLUTE: 1.2 K/UL (ref 1–5.1)
LYMPHOCYTES RELATIVE PERCENT: 12.2 %
MAGNESIUM: 2 MG/DL (ref 1.8–2.4)
MCH RBC QN AUTO: 30.6 PG (ref 26–34)
MCHC RBC AUTO-ENTMCNC: 33.4 G/DL (ref 31–36)
MCV RBC AUTO: 91.8 FL (ref 80–100)
MONOCYTES ABSOLUTE: 0.7 K/UL (ref 0–1.3)
MONOCYTES RELATIVE PERCENT: 7.4 %
NEUTROPHILS ABSOLUTE: 7.6 K/UL (ref 1.7–7.7)
NEUTROPHILS RELATIVE PERCENT: 75.9 %
PDW BLD-RTO: 12.6 % (ref 12.4–15.4)
PERFORMED ON: ABNORMAL
PERFORMED ON: NORMAL
PHOSPHORUS: 2.9 MG/DL (ref 2.5–4.9)
PLATELET # BLD: 369 K/UL (ref 135–450)
PMV BLD AUTO: 8.1 FL (ref 5–10.5)
POTASSIUM SERPL-SCNC: 3.9 MMOL/L (ref 3.5–5.1)
RBC # BLD: 4 M/UL (ref 4.2–5.9)
SODIUM BLD-SCNC: 139 MMOL/L (ref 136–145)
TOTAL PROTEIN: 6 G/DL (ref 6.4–8.2)
WBC # BLD: 10 K/UL (ref 4–11)

## 2020-10-17 PROCEDURE — 2580000003 HC RX 258: Performed by: STUDENT IN AN ORGANIZED HEALTH CARE EDUCATION/TRAINING PROGRAM

## 2020-10-17 PROCEDURE — 6360000002 HC RX W HCPCS: Performed by: STUDENT IN AN ORGANIZED HEALTH CARE EDUCATION/TRAINING PROGRAM

## 2020-10-17 PROCEDURE — 85025 COMPLETE CBC W/AUTO DIFF WBC: CPT

## 2020-10-17 PROCEDURE — 6370000000 HC RX 637 (ALT 250 FOR IP): Performed by: STUDENT IN AN ORGANIZED HEALTH CARE EDUCATION/TRAINING PROGRAM

## 2020-10-17 PROCEDURE — 6370000000 HC RX 637 (ALT 250 FOR IP): Performed by: SURGERY

## 2020-10-17 PROCEDURE — 80076 HEPATIC FUNCTION PANEL: CPT

## 2020-10-17 PROCEDURE — 6370000000 HC RX 637 (ALT 250 FOR IP): Performed by: INTERNAL MEDICINE

## 2020-10-17 PROCEDURE — 36415 COLL VENOUS BLD VENIPUNCTURE: CPT

## 2020-10-17 PROCEDURE — 1200000000 HC SEMI PRIVATE

## 2020-10-17 PROCEDURE — 83735 ASSAY OF MAGNESIUM: CPT

## 2020-10-17 PROCEDURE — 6360000002 HC RX W HCPCS: Performed by: NURSE PRACTITIONER

## 2020-10-17 PROCEDURE — 80069 RENAL FUNCTION PANEL: CPT

## 2020-10-17 RX ORDER — POTASSIUM CHLORIDE 750 MG/1
10 TABLET, EXTENDED RELEASE ORAL ONCE
Status: COMPLETED | OUTPATIENT
Start: 2020-10-17 | End: 2020-10-17

## 2020-10-17 RX ADMIN — DOCUSATE SODIUM 100 MG: 100 CAPSULE, LIQUID FILLED ORAL at 07:59

## 2020-10-17 RX ADMIN — OXYCODONE 10 MG: 5 TABLET ORAL at 08:05

## 2020-10-17 RX ADMIN — METOCLOPRAMIDE 10 MG: 5 INJECTION, SOLUTION INTRAMUSCULAR; INTRAVENOUS at 21:18

## 2020-10-17 RX ADMIN — METHOCARBAMOL TABLETS 750 MG: 500 TABLET, COATED ORAL at 07:58

## 2020-10-17 RX ADMIN — METOCLOPRAMIDE 10 MG: 5 INJECTION, SOLUTION INTRAMUSCULAR; INTRAVENOUS at 14:50

## 2020-10-17 RX ADMIN — PIPERACILLIN AND TAZOBACTAM 3.38 G: 3; .375 INJECTION, POWDER, LYOPHILIZED, FOR SOLUTION INTRAVENOUS at 17:38

## 2020-10-17 RX ADMIN — POTASSIUM CHLORIDE 10 MEQ: 750 TABLET, EXTENDED RELEASE ORAL at 10:50

## 2020-10-17 RX ADMIN — INSULIN LISPRO 4 UNITS: 100 INJECTION, SOLUTION INTRAVENOUS; SUBCUTANEOUS at 11:48

## 2020-10-17 RX ADMIN — METOCLOPRAMIDE 10 MG: 5 INJECTION, SOLUTION INTRAMUSCULAR; INTRAVENOUS at 07:59

## 2020-10-17 RX ADMIN — SODIUM CHLORIDE: 9 INJECTION, SOLUTION INTRAVENOUS at 01:02

## 2020-10-17 RX ADMIN — METHOCARBAMOL TABLETS 750 MG: 500 TABLET, COATED ORAL at 21:17

## 2020-10-17 RX ADMIN — METOCLOPRAMIDE 10 MG: 5 INJECTION, SOLUTION INTRAMUSCULAR; INTRAVENOUS at 03:21

## 2020-10-17 RX ADMIN — METHOCARBAMOL TABLETS 750 MG: 500 TABLET, COATED ORAL at 13:39

## 2020-10-17 RX ADMIN — ENOXAPARIN SODIUM 40 MG: 40 INJECTION SUBCUTANEOUS at 07:58

## 2020-10-17 RX ADMIN — METHOCARBAMOL TABLETS 750 MG: 500 TABLET, COATED ORAL at 17:39

## 2020-10-17 RX ADMIN — POLYETHYLENE GLYCOL (3350) 17 G: 17 POWDER, FOR SOLUTION ORAL at 07:59

## 2020-10-17 RX ADMIN — INSULIN LISPRO 2 UNITS: 100 INJECTION, SOLUTION INTRAVENOUS; SUBCUTANEOUS at 07:59

## 2020-10-17 RX ADMIN — STANDARDIZED SENNA CONCENTRATE 8.6 MG: 8.6 TABLET ORAL at 21:17

## 2020-10-17 RX ADMIN — PIPERACILLIN AND TAZOBACTAM 3.38 G: 3; .375 INJECTION, POWDER, LYOPHILIZED, FOR SOLUTION INTRAVENOUS at 01:02

## 2020-10-17 RX ADMIN — TAMSULOSIN HYDROCHLORIDE 0.4 MG: 0.4 CAPSULE ORAL at 07:58

## 2020-10-17 RX ADMIN — INSULIN LISPRO 4 UNITS: 100 INJECTION, SOLUTION INTRAVENOUS; SUBCUTANEOUS at 07:59

## 2020-10-17 RX ADMIN — OXYCODONE 10 MG: 5 TABLET ORAL at 17:39

## 2020-10-17 RX ADMIN — PIPERACILLIN AND TAZOBACTAM 3.38 G: 3; .375 INJECTION, POWDER, LYOPHILIZED, FOR SOLUTION INTRAVENOUS at 09:42

## 2020-10-17 ASSESSMENT — PAIN SCALES - GENERAL
PAINLEVEL_OUTOF10: 8
PAINLEVEL_OUTOF10: 0
PAINLEVEL_OUTOF10: 8
PAINLEVEL_OUTOF10: 0
PAINLEVEL_OUTOF10: 8

## 2020-10-17 ASSESSMENT — ENCOUNTER SYMPTOMS
VOMITING: 0
CONSTIPATION: 1
EYE PAIN: 0
DIARRHEA: 0
SINUS PAIN: 0
BACK PAIN: 0
ABDOMINAL PAIN: 1
SHORTNESS OF BREATH: 0

## 2020-10-17 ASSESSMENT — PAIN DESCRIPTION - ORIENTATION
ORIENTATION: MID;LOWER

## 2020-10-17 ASSESSMENT — PAIN DESCRIPTION - FREQUENCY
FREQUENCY: CONTINUOUS

## 2020-10-17 ASSESSMENT — PAIN DESCRIPTION - PROGRESSION
CLINICAL_PROGRESSION: NOT CHANGED

## 2020-10-17 ASSESSMENT — PAIN DESCRIPTION - PAIN TYPE
TYPE: ACUTE PAIN;SURGICAL PAIN
TYPE: ACUTE PAIN;SURGICAL PAIN
TYPE: SURGICAL PAIN

## 2020-10-17 ASSESSMENT — PAIN DESCRIPTION - ONSET
ONSET: ON-GOING

## 2020-10-17 ASSESSMENT — PAIN DESCRIPTION - DESCRIPTORS
DESCRIPTORS: ACHING;DISCOMFORT
DESCRIPTORS: ACHING;DISCOMFORT
DESCRIPTORS: DISCOMFORT

## 2020-10-17 ASSESSMENT — PAIN DESCRIPTION - LOCATION
LOCATION: ABDOMEN

## 2020-10-17 ASSESSMENT — VISUAL ACUITY: OU: 1

## 2020-10-17 NOTE — PROGRESS NOTES
Patient A&O, VSS. Patient reports 8/10 abdominal pain. Pain controlled w/ PRN oxycodone and scheduled robaxin. Surgical lap sites clean, dry, and intact. Tolerating CLD. No reports of nausea. Patient urinating freely. No reports of BM this shift. Patient OOB and ambulating frequently. /78   Pulse 95   Temp 98.2 °F (36.8 °C) (Oral)   Resp 18   Ht 6' (1.829 m)   Wt 178 lb (80.7 kg)   SpO2 94%   BMI 24.14 kg/m²     Patient denies any further needs at this time. Bed is in the lowest position, bed alarm off, patient call light and bedside table are within reach. Will continue to monitor for changes in patient status.     Electronically signed by Robi Dow on 10/17/2020 at 11:10 AM

## 2020-10-17 NOTE — PROGRESS NOTES
Pt A&O, VSS. Has slept most of the night, relaxed in bed. Stating pain is continuously 10/10. Tolerating oxycodone. Intermittent nausea still present. Voiding w/o issues. IVF infusing. Surgical sites CDI. No other requests at this time, will continue to monitor.

## 2020-10-17 NOTE — PLAN OF CARE
Problem: Pain:  Goal: Pain level will decrease  Description: Pain level will decrease  Outcome: Ongoing  Note: Patient c/o abdominal pain. Scheduled robaxin and PRN oxycodone administered w/ relief. When pain level was re-assessed patient reported no pain. Will continue to monitor for any changes. Problem: MOBILITY  Goal: Early mobilization is achieved  Outcome: Ongoing  Note: Patient OOB and ambulating in hallway. Patient verbalizes importance of frequent ambulation. Will continue to encourage OOB ambulation.

## 2020-10-17 NOTE — PROGRESS NOTES
Progress Note    Admit Date: 10/8/2020  Day: 9  Diet: DIET FULL LIQUID; Carb Control: 3 carb choices (45 gms)/meal    CC: Pain, nausea, vomiting    Interval history: No overnight events. This AM vitals are stable and WNL (/73, T 98.0, HR 83, RR 16, SaO2 94%). He states that his pain has improved although he had not had a bowel movement as of early this AM. He has been passing gas however. We had eaten some ice last night and will try liquids today. No other acute complaints at this time.      Medications:     Scheduled Meds:   insulin glargine  10 Units Subcutaneous Nightly    methocarbamol  750 mg Oral 4x Daily    docusate sodium  100 mg Oral Daily    senna  1 tablet Oral Nightly    polyethylene glycol  17 g Oral Daily    metoclopramide  10 mg Intravenous Q6H    tamsulosin  0.4 mg Oral Daily    insulin lispro  0-12 Units Subcutaneous TID WC    insulin lispro  0-6 Units Subcutaneous Nightly    insulin lispro  4 Units Subcutaneous TID WC    sodium chloride flush  10 mL Intravenous 2 times per day    enoxaparin  40 mg Subcutaneous Daily    influenza virus vaccine  0.5 mL Intramuscular Once    piperacillin-tazobactam  3.375 g Intravenous Q8H     Continuous Infusions:   sodium chloride 125 mL/hr at 10/17/20 0102    dextrose       PRN Meds:oxyCODONE **OR** oxyCODONE, HYDROmorphone **OR** HYDROmorphone, sodium chloride flush, promethazine **OR** ondansetron, glucose, dextrose, glucagon (rDNA), dextrose, dextrose    Objective:   Vitals:   T-max:  Patient Vitals for the past 8 hrs:   BP Temp Temp src Pulse Resp SpO2   10/17/20 1448 126/78 97.8 °F (36.6 °C) Oral 92 18 95 %   10/17/20 1045 134/78 98.2 °F (36.8 °C) Oral 95 18 94 %   10/17/20 0749 123/72 98.5 °F (36.9 °C) Oral 80 17 92 %       Intake/Output Summary (Last 24 hours) at 10/17/2020 1508  Last data filed at 10/17/2020 1045  Gross per 24 hour   Intake 625 ml   Output --   Net 625 ml     Review of Systems   Constitutional: Negative for fatigue and fever.   HENT: Negative for ear pain and sinus pain. Eyes: Negative for pain. Respiratory: Negative for shortness of breath. Cardiovascular: Negative for chest pain. Gastrointestinal: Positive for abdominal pain and constipation. Negative for diarrhea and vomiting. Genitourinary: Negative for flank pain. Musculoskeletal: Negative for back pain and neck pain. Neurological: Negative for headaches. Psychiatric/Behavioral: Negative for agitation, behavioral problems and confusion. Physical Exam  Constitutional:       General: He is awake. He is not in acute distress. Appearance: Normal appearance. HENT:      Head: Normocephalic and atraumatic. Nose: Nose normal.   Eyes:      General: Vision grossly intact. Gaze aligned appropriately. Right eye: No discharge. Left eye: No discharge. Extraocular Movements: Extraocular movements intact. Conjunctiva/sclera: Conjunctivae normal.   Neck:      Musculoskeletal: Full passive range of motion without pain, normal range of motion and neck supple. Cardiovascular:      Rate and Rhythm: Normal rate and regular rhythm. Pulses: Normal pulses. Heart sounds: Normal heart sounds. Pulmonary:      Effort: Pulmonary effort is normal.      Breath sounds: Normal breath sounds. Abdominal:      General: There is no distension. There are no signs of injury. Palpations: Abdomen is soft. Tenderness: There is abdominal tenderness. Musculoskeletal: Normal range of motion. General: No deformity or signs of injury. Skin:     General: Skin is warm. Neurological:      General: No focal deficit present. Mental Status: He is alert, oriented to person, place, and time and easily aroused. Mental status is at baseline. Motor: Motor function is intact. Coordination: Coordination is intact. Gait: Gait is intact.    Psychiatric:         Attention and Perception: Attention and perception normal. Mood and Affect: Mood and affect normal.         Speech: Speech normal.         Behavior: Behavior normal. Behavior is cooperative. Thought Content: Thought content normal.         Cognition and Memory: Cognition normal.         Judgment: Judgment normal.       LABS:    CBC:   Recent Labs     10/15/20  0835 10/16/20  0433 10/17/20  0431   WBC 9.8 12.7* 10.0   HGB 13.6 13.4* 12.2*   HCT 41.2 41.0 36.7*    305 369   MCV 92.6 94.3 91.8     Renal:    Recent Labs     10/15/20  0835 10/16/20  0433 10/16/20  0712 10/17/20  0431   * 134*  --  139   K 4.6 5.9* 4.6 3.9    97*  --  101   CO2 23 24  --  27   BUN 10 10  --  12   CREATININE 1.0 1.1  --  1.2   GLUCOSE 218* 224*  --  172*   CALCIUM 9.3 9.6  --  9.1   MG 1.90 1.80  --  2.00   PHOS 3.1 4.5  --  2.9   ANIONGAP 12 13  --  11     Hepatic:   Recent Labs     10/15/20  0835 10/16/20  0433 10/17/20  0431   AST 27 25 12*   ALT 82* 59* 40   BILITOT 1.7* 1.3* 1.2*   BILIDIR 0.4* 0.3 0.3   PROT 6.5 6.4 6.0*   LABALBU 3.1* 3.1* 3.1*   ALKPHOS 281* 249* 206*     Troponin: No results for input(s): TROPONINI in the last 72 hours. BNP: No results for input(s): BNP in the last 72 hours. Lipids: No results for input(s): CHOL, HDL in the last 72 hours. Invalid input(s): LDLCALCU, TRIGLYCERIDE  ABGs:  No results for input(s): PHART, PMS0NUM, PO2ART, LQM7WWO, BEART, THGBART, Y2DSTGZQ, AOY0ARJ in the last 72 hours. INR: No results for input(s): INR in the last 72 hours. Lactate: No results for input(s): LACTATE in the last 72 hours. Cultures:  -----------------------------------------------------------------  RAD:   XR ABDOMEN (KUB) (SINGLE AP VIEW)   Final Result      1. Gaseous bowel distention. Bowel dilatation slightly increased from yesterday. Small bowel is slightly more disproportionately dilated. XR ABDOMEN (KUB) (SINGLE AP VIEW)   Final Result   1. There are dilated loops of small bowel.  Air and stool present within nondilated colon. The findings may reflect partial small bowel obstruction. This could be better characterized with a contrast-enhanced CT scan of the abdomen and pelvis. The small    bowel dilatation is new from 10/8/2020. MRI ABDOMEN W WO CONTRAST MRCP   Final Result   1. Extensive gallbladder wall thickening with multiple signal void suggesting extensive cholelithiasis and possible impacted stones in the wall the gallbladder. Diffuse thickening with enhancement may reflect adenomyomatosis or chronic inflammation. Gallbladder neoplasm is not excluded. In general the degree of wall thickening appears to be improved in comparison to a previous CT exam from 2019.   2. Wall thickening and suspected inflammatory changes extend into the neck of the gallbladder and results in mild stenosis of the common hepatic duct. This does not result in significant ductal dilation within the liver. Neoplastic abnormality would be    difficult to exclude. 3. No evidence of choledocholithiasis. XR CHEST PORTABLE   Final Result      Bibasilar minimal linear atelectasis. FL ERCP BILIARY AND PANCREATIC S&I   Final Result      Intraoperative fluoroscopy was performed. Correlate with procedural note for additional information. US ABDOMEN LIMITED   Final Result      Nondiagnostic evaluation of the gallbladder which is obscured. No acute sonographic abnormality of the abdomen. .             CT ABDOMEN PELVIS WO CONTRAST Additional Contrast? None   Final Result      No acute abdominopelvic abnormality. Contracted gallbladder with questionable wall thickening and small nonspecific densities along the fundus. There also may be gallstones. As described on prior report, follow-up MRI/MRCP should be considered. No definite CT evidence of acute    cholecystitis. Prostatomegaly with circumferential bladder wall thickening, which may be secondary to bladder outlet obstruction versus cystitis.

## 2020-10-18 LAB
ALBUMIN SERPL-MCNC: 3.4 G/DL (ref 3.4–5)
ALP BLD-CCNC: 175 U/L (ref 40–129)
ALT SERPL-CCNC: 29 U/L (ref 10–40)
ANION GAP SERPL CALCULATED.3IONS-SCNC: 10 MMOL/L (ref 3–16)
AST SERPL-CCNC: 10 U/L (ref 15–37)
BASOPHILS ABSOLUTE: 0.1 K/UL (ref 0–0.2)
BASOPHILS RELATIVE PERCENT: 0.8 %
BILIRUB SERPL-MCNC: 1.5 MG/DL (ref 0–1)
BILIRUBIN DIRECT: 0.4 MG/DL (ref 0–0.3)
BILIRUBIN, INDIRECT: 1.1 MG/DL (ref 0–1)
BODY FLUID CULTURE, STERILE: NORMAL
BUN BLDV-MCNC: 11 MG/DL (ref 7–20)
CALCIUM SERPL-MCNC: 9 MG/DL (ref 8.3–10.6)
CHLORIDE BLD-SCNC: 104 MMOL/L (ref 99–110)
CO2: 25 MMOL/L (ref 21–32)
CREAT SERPL-MCNC: 1.1 MG/DL (ref 0.8–1.3)
EOSINOPHILS ABSOLUTE: 0.5 K/UL (ref 0–0.6)
EOSINOPHILS RELATIVE PERCENT: 6.2 %
GFR AFRICAN AMERICAN: >60
GFR NON-AFRICAN AMERICAN: >60
GLUCOSE BLD-MCNC: 103 MG/DL (ref 70–99)
GLUCOSE BLD-MCNC: 114 MG/DL (ref 70–99)
GLUCOSE BLD-MCNC: 121 MG/DL (ref 70–99)
GLUCOSE BLD-MCNC: 124 MG/DL (ref 70–99)
GLUCOSE BLD-MCNC: 127 MG/DL (ref 70–99)
GLUCOSE BLD-MCNC: 132 MG/DL (ref 70–99)
GRAM STAIN RESULT: NORMAL
HCT VFR BLD CALC: 34.5 % (ref 40.5–52.5)
HEMOGLOBIN: 11.7 G/DL (ref 13.5–17.5)
LYMPHOCYTES ABSOLUTE: 1.9 K/UL (ref 1–5.1)
LYMPHOCYTES RELATIVE PERCENT: 21.3 %
MAGNESIUM: 1.9 MG/DL (ref 1.8–2.4)
MCH RBC QN AUTO: 31.3 PG (ref 26–34)
MCHC RBC AUTO-ENTMCNC: 34.1 G/DL (ref 31–36)
MCV RBC AUTO: 92 FL (ref 80–100)
MONOCYTES ABSOLUTE: 0.7 K/UL (ref 0–1.3)
MONOCYTES RELATIVE PERCENT: 7.8 %
NEUTROPHILS ABSOLUTE: 5.6 K/UL (ref 1.7–7.7)
NEUTROPHILS RELATIVE PERCENT: 63.9 %
PDW BLD-RTO: 12.6 % (ref 12.4–15.4)
PERFORMED ON: ABNORMAL
PHOSPHORUS: 3.1 MG/DL (ref 2.5–4.9)
PLATELET # BLD: 383 K/UL (ref 135–450)
PMV BLD AUTO: 7.8 FL (ref 5–10.5)
POTASSIUM SERPL-SCNC: 3.9 MMOL/L (ref 3.5–5.1)
RBC # BLD: 3.75 M/UL (ref 4.2–5.9)
SODIUM BLD-SCNC: 139 MMOL/L (ref 136–145)
TOTAL PROTEIN: 5.8 G/DL (ref 6.4–8.2)
WBC # BLD: 8.8 K/UL (ref 4–11)

## 2020-10-18 PROCEDURE — 6370000000 HC RX 637 (ALT 250 FOR IP): Performed by: STUDENT IN AN ORGANIZED HEALTH CARE EDUCATION/TRAINING PROGRAM

## 2020-10-18 PROCEDURE — 6370000000 HC RX 637 (ALT 250 FOR IP): Performed by: SURGERY

## 2020-10-18 PROCEDURE — 80069 RENAL FUNCTION PANEL: CPT

## 2020-10-18 PROCEDURE — 6360000002 HC RX W HCPCS: Performed by: STUDENT IN AN ORGANIZED HEALTH CARE EDUCATION/TRAINING PROGRAM

## 2020-10-18 PROCEDURE — 1200000000 HC SEMI PRIVATE

## 2020-10-18 PROCEDURE — 6370000000 HC RX 637 (ALT 250 FOR IP)

## 2020-10-18 PROCEDURE — 6360000002 HC RX W HCPCS: Performed by: NURSE PRACTITIONER

## 2020-10-18 PROCEDURE — 36415 COLL VENOUS BLD VENIPUNCTURE: CPT

## 2020-10-18 PROCEDURE — 85025 COMPLETE CBC W/AUTO DIFF WBC: CPT

## 2020-10-18 PROCEDURE — 80076 HEPATIC FUNCTION PANEL: CPT

## 2020-10-18 PROCEDURE — 2580000003 HC RX 258: Performed by: STUDENT IN AN ORGANIZED HEALTH CARE EDUCATION/TRAINING PROGRAM

## 2020-10-18 PROCEDURE — 83735 ASSAY OF MAGNESIUM: CPT

## 2020-10-18 PROCEDURE — 6370000000 HC RX 637 (ALT 250 FOR IP): Performed by: INTERNAL MEDICINE

## 2020-10-18 RX ORDER — POTASSIUM CHLORIDE 750 MG/1
10 TABLET, EXTENDED RELEASE ORAL ONCE
Status: COMPLETED | OUTPATIENT
Start: 2020-10-18 | End: 2020-10-18

## 2020-10-18 RX ORDER — INSULIN LISPRO 100 [IU]/ML
0-6 INJECTION, SOLUTION INTRAVENOUS; SUBCUTANEOUS
Status: DISCONTINUED | OUTPATIENT
Start: 2020-10-18 | End: 2020-10-20 | Stop reason: HOSPADM

## 2020-10-18 RX ORDER — MAGNESIUM SULFATE IN WATER 40 MG/ML
2 INJECTION, SOLUTION INTRAVENOUS ONCE
Status: COMPLETED | OUTPATIENT
Start: 2020-10-18 | End: 2020-10-18

## 2020-10-18 RX ORDER — INSULIN LISPRO 100 [IU]/ML
0-3 INJECTION, SOLUTION INTRAVENOUS; SUBCUTANEOUS NIGHTLY
Status: DISCONTINUED | OUTPATIENT
Start: 2020-10-18 | End: 2020-10-20 | Stop reason: HOSPADM

## 2020-10-18 RX ORDER — INSULIN LISPRO 100 [IU]/ML
2 INJECTION, SOLUTION INTRAVENOUS; SUBCUTANEOUS
Status: DISCONTINUED | OUTPATIENT
Start: 2020-10-18 | End: 2020-10-20 | Stop reason: HOSPADM

## 2020-10-18 RX ADMIN — SODIUM CHLORIDE: 9 INJECTION, SOLUTION INTRAVENOUS at 01:05

## 2020-10-18 RX ADMIN — PIPERACILLIN AND TAZOBACTAM 3.38 G: 3; .375 INJECTION, POWDER, LYOPHILIZED, FOR SOLUTION INTRAVENOUS at 01:04

## 2020-10-18 RX ADMIN — PIPERACILLIN AND TAZOBACTAM 3.38 G: 3; .375 INJECTION, POWDER, LYOPHILIZED, FOR SOLUTION INTRAVENOUS at 18:19

## 2020-10-18 RX ADMIN — INSULIN GLARGINE 3 UNITS: 100 INJECTION, SOLUTION SUBCUTANEOUS at 08:27

## 2020-10-18 RX ADMIN — METOCLOPRAMIDE 10 MG: 5 INJECTION, SOLUTION INTRAMUSCULAR; INTRAVENOUS at 21:57

## 2020-10-18 RX ADMIN — POTASSIUM CHLORIDE 10 MEQ: 750 TABLET, EXTENDED RELEASE ORAL at 11:35

## 2020-10-18 RX ADMIN — METOCLOPRAMIDE 10 MG: 5 INJECTION, SOLUTION INTRAMUSCULAR; INTRAVENOUS at 08:11

## 2020-10-18 RX ADMIN — POLYETHYLENE GLYCOL (3350) 17 G: 17 POWDER, FOR SOLUTION ORAL at 11:33

## 2020-10-18 RX ADMIN — DOCUSATE SODIUM 100 MG: 100 CAPSULE, LIQUID FILLED ORAL at 11:31

## 2020-10-18 RX ADMIN — METHOCARBAMOL TABLETS 750 MG: 500 TABLET, COATED ORAL at 14:04

## 2020-10-18 RX ADMIN — METOCLOPRAMIDE 10 MG: 5 INJECTION, SOLUTION INTRAMUSCULAR; INTRAVENOUS at 15:47

## 2020-10-18 RX ADMIN — METHOCARBAMOL TABLETS 750 MG: 500 TABLET, COATED ORAL at 22:00

## 2020-10-18 RX ADMIN — METOCLOPRAMIDE 10 MG: 5 INJECTION, SOLUTION INTRAMUSCULAR; INTRAVENOUS at 03:31

## 2020-10-18 RX ADMIN — HYDROMORPHONE HYDROCHLORIDE 0.5 MG: 1 INJECTION, SOLUTION INTRAMUSCULAR; INTRAVENOUS; SUBCUTANEOUS at 05:32

## 2020-10-18 RX ADMIN — ENOXAPARIN SODIUM 40 MG: 40 INJECTION SUBCUTANEOUS at 08:10

## 2020-10-18 RX ADMIN — MAGNESIUM SULFATE HEPTAHYDRATE 2 G: 40 INJECTION, SOLUTION INTRAVENOUS at 08:11

## 2020-10-18 RX ADMIN — HYDROMORPHONE HYDROCHLORIDE 0.5 MG: 1 INJECTION, SOLUTION INTRAMUSCULAR; INTRAVENOUS; SUBCUTANEOUS at 19:55

## 2020-10-18 RX ADMIN — METHOCARBAMOL TABLETS 750 MG: 500 TABLET, COATED ORAL at 16:51

## 2020-10-18 RX ADMIN — INSULIN GLARGINE 5 UNITS: 100 INJECTION, SOLUTION SUBCUTANEOUS at 00:22

## 2020-10-18 RX ADMIN — STANDARDIZED SENNA CONCENTRATE 8.6 MG: 8.6 TABLET ORAL at 22:00

## 2020-10-18 RX ADMIN — PIPERACILLIN AND TAZOBACTAM 3.38 G: 3; .375 INJECTION, POWDER, LYOPHILIZED, FOR SOLUTION INTRAVENOUS at 11:31

## 2020-10-18 RX ADMIN — TAMSULOSIN HYDROCHLORIDE 0.4 MG: 0.4 CAPSULE ORAL at 08:16

## 2020-10-18 RX ADMIN — INSULIN LISPRO 2 UNITS: 100 INJECTION, SOLUTION INTRAVENOUS; SUBCUTANEOUS at 11:37

## 2020-10-18 ASSESSMENT — PAIN DESCRIPTION - ORIENTATION
ORIENTATION: LOWER;MID

## 2020-10-18 ASSESSMENT — PAIN SCALES - GENERAL
PAINLEVEL_OUTOF10: 0
PAINLEVEL_OUTOF10: 10
PAINLEVEL_OUTOF10: 8

## 2020-10-18 ASSESSMENT — PAIN DESCRIPTION - ONSET
ONSET: GRADUAL
ONSET: ON-GOING
ONSET: ON-GOING
ONSET: GRADUAL

## 2020-10-18 ASSESSMENT — PAIN DESCRIPTION - LOCATION
LOCATION: ABDOMEN

## 2020-10-18 ASSESSMENT — PAIN DESCRIPTION - PROGRESSION
CLINICAL_PROGRESSION: GRADUALLY WORSENING

## 2020-10-18 ASSESSMENT — PAIN DESCRIPTION - DESCRIPTORS
DESCRIPTORS: DISCOMFORT
DESCRIPTORS: ACHING;DISCOMFORT

## 2020-10-18 ASSESSMENT — PAIN DESCRIPTION - PAIN TYPE
TYPE: ACUTE PAIN;SURGICAL PAIN

## 2020-10-18 ASSESSMENT — ENCOUNTER SYMPTOMS
ABDOMINAL DISTENTION: 1
RESPIRATORY NEGATIVE: 1
NAUSEA: 1
ABDOMINAL PAIN: 1

## 2020-10-18 ASSESSMENT — PAIN DESCRIPTION - FREQUENCY
FREQUENCY: INTERMITTENT
FREQUENCY: INTERMITTENT
FREQUENCY: CONTINUOUS
FREQUENCY: CONTINUOUS

## 2020-10-18 NOTE — PLAN OF CARE
Problem: Pain:  Goal: Pain level will decrease  Description: Pain level will decrease  Outcome: Ongoing  Note: Pain controlled with Robaxin. Upon reassessment patient denies any pain. Will continue to monitor     Problem: Falls - Risk of:  Goal: Will remain free from falls  Description: Will remain free from falls  Outcome: Ongoing  Note: Patient remains free from physical injury. Patient ambulates in room independently and tolerates well. Patient in bed lowest position,wheels locked. 2/4 side rails up Call light and beside table within reach. Will continue to monitor       Problem: Infection - Surgical Site:  Goal: Will show no infection signs and symptoms  Description: Will show no infection signs and symptoms  Outcome: Ongoing  Note: CDI surgical sites. No signs of infection noted. Will continue to monitor     Problem: Bowel/Gastric:  Goal: Ability to achieve a regular elimination pattern will improve  Description: Ability to achieve a regular elimination pattern will improve  Outcome: Ongoing  Note: Abdomen assessed and is soft,hypoactive in all quadrants. No BM noted .  Will continue to monitor

## 2020-10-18 NOTE — PROGRESS NOTES
Hepatobiliary Surgery  Resident Daily Progress Note  Purvi Sellar    CC: cholecystoduodenal fistula    Subjective :   Denies nausea this AM  Had oral intake yesterday and tolerated  Passing gas urinating and passing gas with no difficulty. Objective    Infusions:   sodium chloride 125 mL/hr at 10/18/20 0105    dextrose          I/O:I/O last 3 completed shifts: In: 1550.3 [P.O.:900; I.V.:550.3; IV Piggyback:100]  Out: -            Wt Readings from Last 1 Encounters:   10/08/20 178 lb (80.7 kg)                 LABS:   Recent Labs     10/17/20  0431 10/18/20  0413   WBC 10.0 8.8   HGB 12.2* 11.7*   HCT 36.7* 34.5*   MCV 91.8 92.0    383        Recent Labs     10/17/20  0431 10/18/20  0413    139   K 3.9 3.9    104   CO2 27 25   PHOS 2.9 3.1   BUN 12 11   CREATININE 1.2 1.1        Recent Labs     10/17/20  0431 10/18/20  0413   AST 12* 10*   ALT 40 29   BILIDIR 0.3 0.4*   BILITOT 1.2* 1.5*   ALKPHOS 206* 175*        No results for input(s): LIPASE, AMYLASE in the last 72 hours. Recent Labs     10/17/20  0431 10/18/20  0413   PROT 6.0* 5.8*        No results for input(s): CKTOTAL, CKMB, CKMBINDEX, TROPONINI in the last 72 hours. Exam:  /74   Pulse 81   Temp 99 °F (37.2 °C) (Oral)   Resp 16   Ht 6' (1.829 m)   Wt 178 lb (80.7 kg)   SpO2 96%   BMI 24.14 kg/m²     CONSTITUTIONAL:  awake, alert & oriented x 3, cooperative, no apparent distress, and appears stated age  HEENT: No palpable lymphadenopathy, no icterus  LUNGS: No increased work of breathing  CV:  Regular rate and rhythm  ABDOMEN: Soft,  Appropriately tender, non-distended, incisions c/d/i with surgical glue in place, drain with minimal output    ASSESSMENT/PLAN:   Pt. is a 56 y.o. male with choledocholithiasis s/p ERCP 10/9 with sphincterotomy and stone extraction, POD#5 from a robotic assisted laparoscopic cholecystectomy with cholecystoduodenal fistula takedown, drainage of liver abscess and EGD.     · Fulls this AM, patient to go slow and take multiple small meals throughout the day if possible  · Continue Zosyn for 10 days post-op (day 5/10)  · General surgery team to follow  · Continued medical management and glucose control per primary  · Anticipate discharge in 1 to 2 days, pending resolution of ileus      Veronica Rodriguez DO  6:45 AM  10/18/2020   [phone]

## 2020-10-18 NOTE — PLAN OF CARE
Problem: Pain:  Goal: Pain level will decrease  Description: Pain level will decrease  10/18/2020 1305 by Samantha Duty  Outcome: Ongoing  Note: Patient reports pain is tolerable when \"not moving\". Scheduled robaxin and PRN oxycodone administered per orders. Will re-assess pain level as needed. Problem: MOBILITY  Goal: Early mobilization is achieved  Outcome: Ongoing  Note: Patient OOB and ambulating in hallway throughout shift. Patient verbalizes understanding of frequent ambulation.

## 2020-10-18 NOTE — PROGRESS NOTES
Progress Note    Admit Date: 10/8/2020  Diet: Dietary Nutrition Supplements: Clear Liquid Oral Supplement  DIET FULL LIQUID; Carb Control: 3 carb choices (45 gms)/meal    CC: Abdominal pain, nausea, vomiting    Interval history: Patient still has a reduced appetite at this time and has intermittent abdominal pain. P.o. intake has been extremely limited. Is complaining of some nausea, but no vomiting. Passing gas and voiding well, is also a bowel movement.  -Is making improvements, however still on a liquid diet at this time. We will advance as tolerated. Likely discharge in 1 to 2 days.       Medications:     Scheduled Meds:   magnesium sulfate  2 g Intravenous Once    potassium chloride  10 mEq Oral Once    insulin lispro  0-6 Units Subcutaneous TID WC    insulin lispro  0-3 Units Subcutaneous Nightly    insulin lispro  2 Units Subcutaneous TID WC    insulin glargine  3 Units Subcutaneous Once    insulin glargine  10 Units Subcutaneous Nightly    methocarbamol  750 mg Oral 4x Daily    docusate sodium  100 mg Oral Daily    senna  1 tablet Oral Nightly    polyethylene glycol  17 g Oral Daily    metoclopramide  10 mg Intravenous Q6H    tamsulosin  0.4 mg Oral Daily    sodium chloride flush  10 mL Intravenous 2 times per day    enoxaparin  40 mg Subcutaneous Daily    influenza virus vaccine  0.5 mL Intramuscular Once    piperacillin-tazobactam  3.375 g Intravenous Q8H     Continuous Infusions:   sodium chloride 75 mL/hr at 10/18/20 0808    dextrose       PRN Meds:oxyCODONE **OR** oxyCODONE, HYDROmorphone **OR** HYDROmorphone, sodium chloride flush, promethazine **OR** ondansetron, glucose, dextrose, glucagon (rDNA), dextrose, dextrose    Objective:   Vitals:   T-max:  Patient Vitals for the past 8 hrs:   BP Temp Temp src Pulse Resp SpO2   10/18/20 0800 105/69 98.1 °F (36.7 °C) Oral 89 16 93 %   10/18/20 0330 123/74 99 °F (37.2 °C) Oral 81 16 96 %       Intake/Output Summary (Last 24 hours) at 10/18/2020 0823  Last data filed at 10/18/2020 0800  Gross per 24 hour   Intake 1965.96 ml   Output --   Net 1965.96 ml       Review of Systems   Constitutional: Positive for appetite change. HENT: Negative. Respiratory: Negative. Cardiovascular: Negative. Gastrointestinal: Positive for abdominal distention, abdominal pain and nausea. Genitourinary: Negative. Musculoskeletal: Negative. Skin: Negative. Neurological: Negative. Hematological: Negative. Psychiatric/Behavioral: Negative. Physical Exam  Constitutional:       General: He is not in acute distress. Appearance: Normal appearance. Cardiovascular:      Rate and Rhythm: Normal rate and regular rhythm. Pulses: Normal pulses. Heart sounds: Normal heart sounds. Pulmonary:      Effort: Pulmonary effort is normal.      Breath sounds: Normal breath sounds. Abdominal:      General: There is distension. Palpations: Abdomen is soft. Tenderness: There is abdominal tenderness. Comments: Appropriately tender throughout abdomen  Incision C/D/I   Musculoskeletal: Normal range of motion. Skin:     General: Skin is warm and dry. Neurological:      General: No focal deficit present. Mental Status: He is alert. Mental status is at baseline.    Psychiatric:         Mood and Affect: Mood normal.         LABS:    CBC:   Recent Labs     10/16/20  0433 10/17/20  0431 10/18/20  0413   WBC 12.7* 10.0 8.8   HGB 13.4* 12.2* 11.7*   HCT 41.0 36.7* 34.5*    369 383   MCV 94.3 91.8 92.0     Renal:    Recent Labs     10/16/20  0433 10/16/20  0712 10/17/20  0431 10/18/20  0413   *  --  139 139   K 5.9* 4.6 3.9 3.9   CL 97*  --  101 104   CO2 24  --  27 25   BUN 10  --  12 11   CREATININE 1.1  --  1.2 1.1   GLUCOSE 224*  --  172* 132*   CALCIUM 9.6  --  9.1 9.0   MG 1.80  --  2.00 1.90   PHOS 4.5  --  2.9 3.1   ANIONGAP 13  --  11 10     Hepatic:   Recent Labs     10/16/20  0433 10/17/20  0431 10/18/20  0413   AST 25 12* 10*   ALT 59* 40 29   BILITOT 1.3* 1.2* 1.5*   BILIDIR 0.3 0.3 0.4*   PROT 6.4 6.0* 5.8*   LABALBU 3.1* 3.1* 3.4   ALKPHOS 249* 206* 175*     Troponin: No results for input(s): TROPONINI in the last 72 hours. BNP: No results for input(s): BNP in the last 72 hours. Lipids: No results for input(s): CHOL, HDL in the last 72 hours. Invalid input(s): LDLCALCU, TRIGLYCERIDE  ABGs:  No results for input(s): PHART, NAL3XQC, PO2ART, WPG5VOG, BEART, THGBART, F7KEVJDZ, HSX0LLC in the last 72 hours. INR: No results for input(s): INR in the last 72 hours. Lactate: No results for input(s): LACTATE in the last 72 hours. Cultures:  -----------------------------------------------------------------  RAD:   XR ABDOMEN (KUB) (SINGLE AP VIEW)   Final Result      1. Gaseous bowel distention. Bowel dilatation slightly increased from yesterday. Small bowel is slightly more disproportionately dilated. XR ABDOMEN (KUB) (SINGLE AP VIEW)   Final Result   1. There are dilated loops of small bowel. Air and stool present within nondilated colon. The findings may reflect partial small bowel obstruction. This could be better characterized with a contrast-enhanced CT scan of the abdomen and pelvis. The small    bowel dilatation is new from 10/8/2020. MRI ABDOMEN W WO CONTRAST MRCP   Final Result   1. Extensive gallbladder wall thickening with multiple signal void suggesting extensive cholelithiasis and possible impacted stones in the wall the gallbladder. Diffuse thickening with enhancement may reflect adenomyomatosis or chronic inflammation. Gallbladder neoplasm is not excluded. In general the degree of wall thickening appears to be improved in comparison to a previous CT exam from 2019.   2. Wall thickening and suspected inflammatory changes extend into the neck of the gallbladder and results in mild stenosis of the common hepatic duct.  This does not result in significant ductal dilation within the liver. Neoplastic abnormality would be    difficult to exclude. 3. No evidence of choledocholithiasis. XR CHEST PORTABLE   Final Result      Bibasilar minimal linear atelectasis. FL ERCP BILIARY AND PANCREATIC S&I   Final Result      Intraoperative fluoroscopy was performed. Correlate with procedural note for additional information. US ABDOMEN LIMITED   Final Result      Nondiagnostic evaluation of the gallbladder which is obscured. No acute sonographic abnormality of the abdomen. .             CT ABDOMEN PELVIS WO CONTRAST Additional Contrast? None   Final Result      No acute abdominopelvic abnormality. Contracted gallbladder with questionable wall thickening and small nonspecific densities along the fundus. There also may be gallstones. As described on prior report, follow-up MRI/MRCP should be considered. No definite CT evidence of acute    cholecystitis. Prostatomegaly with circumferential bladder wall thickening, which may be secondary to bladder outlet obstruction versus cystitis. Assessment/Plan:   60M PMH T2DM, HLD, HTN, BPH presented 10/08 w/ abd pain + nausea/vomiting.      1. Choledocholithiasis  - CT-AP (10/08): Contracted gallbladder, possible wall thickening. Possible gallstones. - RUQ-US (10/09): No intrahepatic biliary duct dilation. CBD normal in diameter. Sonographic Layton's sign negative. - ERCP (10/09): CBD filling defect c/w choledocholithiasis. Mild CBD dilation.   - S/P Cholecystectomy (10/13). - Plan: Zosyn 3.375 mg QD. Will transition to augmentin at time of discharge. Continue abx until 10/23.     2. Post-op ileus  - KUB (10/16/20): Gaseous bowel distention, especially small bowel. - Plan: Advance diet as tolerated. Colace 100 mg QD. Senna 8.6 mg qPM. PEG 17 g QD.     3. Nausea  - Metoclopramide 10 mg q6h.     4. T2DM  - A1c 9.0 (10/10/20).   - Had taken metformin prior to admission.  - Plan: Lantus 10 units qPM. Lispro 3 units TID. LDSSI.     5. Urinary retention  - Tamsulosin 0.4 mg QD.     6. Muscle spasms  - Robaxin 750 mg QID.      7. DVT PPX  - Lovenox 40 mg QD.     8. Diet  - Full liquid. - Advance as tolerated.     9.  Disposition  -Discharge home when tolerating general diet     Code Status: Full code  Jose Alberto Oakes MD, PGY-1  10/18/20  8:23 AM    This patient has been staffed and discussed with Whitney Mcardle, MD.

## 2020-10-18 NOTE — PROGRESS NOTES
Patient alert and oriented. VSS Patient denies any pain or nausea. CDI surgical sites. Insulin lispro not given order parameters not met. Patient received half dose of lantus : 5 mg per Brea Vaughn MD order. Patient in bed lowest position call light and bedside table within reach. All needs are met at this time. Patient aware to call if any help needed. Will continue to monitor  /69   Pulse 84   Temp 98.5 °F (36.9 °C) (Oral)   Resp 18   Ht 6' (1.829 m)   Wt 178 lb (80.7 kg)   SpO2 95%   BMI 24.14 kg/m²

## 2020-10-18 NOTE — PROGRESS NOTES
Patient A&O, VSS. Surgical lap sites clean, dry, and intact. Abdomen appears distended and feels tight. Hypoactive bowel sounds auscultated throughout abd. Surgical resident made aware and at bedside. Patient encouraged to start slow w/ FLD. Patient passing gas. IVF running per orders. Patient OOB and urinating freely. No reports of BM. Patient given scheduled stool softner. /75   Pulse 83   Temp 97.9 °F (36.6 °C) (Oral)   Resp 16   Ht 6' (1.829 m)   Wt 178 lb (80.7 kg)   SpO2 96%   BMI 24.14 kg/m²     Patient denies any further needs at this time. Bed is in the lowest position, bed alarm off, patient call light and bedside table are within reach. Will continue to monitor for changes in patient status.   Electronically signed by Pattie Long on 10/18/2020 at 1:13 PM

## 2020-10-19 ENCOUNTER — APPOINTMENT (OUTPATIENT)
Dept: CT IMAGING | Age: 60
DRG: 417 | End: 2020-10-19
Payer: COMMERCIAL

## 2020-10-19 LAB
ALBUMIN SERPL-MCNC: 3.1 G/DL (ref 3.4–5)
ALP BLD-CCNC: 156 U/L (ref 40–129)
ALT SERPL-CCNC: 23 U/L (ref 10–40)
ANION GAP SERPL CALCULATED.3IONS-SCNC: 12 MMOL/L (ref 3–16)
AST SERPL-CCNC: 9 U/L (ref 15–37)
BASOPHILS ABSOLUTE: 0.1 K/UL (ref 0–0.2)
BASOPHILS RELATIVE PERCENT: 1.1 %
BILIRUB SERPL-MCNC: 1.6 MG/DL (ref 0–1)
BILIRUBIN DIRECT: 0.4 MG/DL (ref 0–0.3)
BILIRUBIN, INDIRECT: 1.2 MG/DL (ref 0–1)
BUN BLDV-MCNC: 11 MG/DL (ref 7–20)
CALCIUM SERPL-MCNC: 8.9 MG/DL (ref 8.3–10.6)
CHLORIDE BLD-SCNC: 104 MMOL/L (ref 99–110)
CO2: 24 MMOL/L (ref 21–32)
CREAT SERPL-MCNC: 1.1 MG/DL (ref 0.8–1.3)
EOSINOPHILS ABSOLUTE: 0.5 K/UL (ref 0–0.6)
EOSINOPHILS RELATIVE PERCENT: 5.5 %
GFR AFRICAN AMERICAN: >60
GFR NON-AFRICAN AMERICAN: >60
GLUCOSE BLD-MCNC: 119 MG/DL (ref 70–99)
GLUCOSE BLD-MCNC: 130 MG/DL (ref 70–99)
GLUCOSE BLD-MCNC: 137 MG/DL (ref 70–99)
GLUCOSE BLD-MCNC: 138 MG/DL (ref 70–99)
GLUCOSE BLD-MCNC: 145 MG/DL (ref 70–99)
HCT VFR BLD CALC: 33.7 % (ref 40.5–52.5)
HEMOGLOBIN: 11.1 G/DL (ref 13.5–17.5)
LYMPHOCYTES ABSOLUTE: 1.5 K/UL (ref 1–5.1)
LYMPHOCYTES RELATIVE PERCENT: 17 %
MAGNESIUM: 1.9 MG/DL (ref 1.8–2.4)
MCH RBC QN AUTO: 30.1 PG (ref 26–34)
MCHC RBC AUTO-ENTMCNC: 33 G/DL (ref 31–36)
MCV RBC AUTO: 91.4 FL (ref 80–100)
MONOCYTES ABSOLUTE: 0.6 K/UL (ref 0–1.3)
MONOCYTES RELATIVE PERCENT: 7.2 %
NEUTROPHILS ABSOLUTE: 6.3 K/UL (ref 1.7–7.7)
NEUTROPHILS RELATIVE PERCENT: 69.2 %
PDW BLD-RTO: 12.6 % (ref 12.4–15.4)
PERFORMED ON: ABNORMAL
PHOSPHORUS: 2.8 MG/DL (ref 2.5–4.9)
PLATELET # BLD: 396 K/UL (ref 135–450)
PMV BLD AUTO: 8 FL (ref 5–10.5)
POTASSIUM SERPL-SCNC: 3.8 MMOL/L (ref 3.5–5.1)
RBC # BLD: 3.69 M/UL (ref 4.2–5.9)
SODIUM BLD-SCNC: 140 MMOL/L (ref 136–145)
TOTAL PROTEIN: 5.5 G/DL (ref 6.4–8.2)
WBC # BLD: 9.1 K/UL (ref 4–11)

## 2020-10-19 PROCEDURE — 83735 ASSAY OF MAGNESIUM: CPT

## 2020-10-19 PROCEDURE — 1200000000 HC SEMI PRIVATE

## 2020-10-19 PROCEDURE — 6360000002 HC RX W HCPCS: Performed by: STUDENT IN AN ORGANIZED HEALTH CARE EDUCATION/TRAINING PROGRAM

## 2020-10-19 PROCEDURE — 85025 COMPLETE CBC W/AUTO DIFF WBC: CPT

## 2020-10-19 PROCEDURE — 2580000003 HC RX 258: Performed by: STUDENT IN AN ORGANIZED HEALTH CARE EDUCATION/TRAINING PROGRAM

## 2020-10-19 PROCEDURE — 74176 CT ABD & PELVIS W/O CONTRAST: CPT

## 2020-10-19 PROCEDURE — 6370000000 HC RX 637 (ALT 250 FOR IP): Performed by: STUDENT IN AN ORGANIZED HEALTH CARE EDUCATION/TRAINING PROGRAM

## 2020-10-19 PROCEDURE — 6360000004 HC RX CONTRAST MEDICATION: Performed by: STUDENT IN AN ORGANIZED HEALTH CARE EDUCATION/TRAINING PROGRAM

## 2020-10-19 PROCEDURE — 36415 COLL VENOUS BLD VENIPUNCTURE: CPT

## 2020-10-19 PROCEDURE — 6360000002 HC RX W HCPCS: Performed by: NURSE PRACTITIONER

## 2020-10-19 PROCEDURE — 80076 HEPATIC FUNCTION PANEL: CPT

## 2020-10-19 PROCEDURE — 6370000000 HC RX 637 (ALT 250 FOR IP): Performed by: SURGERY

## 2020-10-19 PROCEDURE — 80069 RENAL FUNCTION PANEL: CPT

## 2020-10-19 RX ORDER — MAGNESIUM SULFATE IN WATER 40 MG/ML
2 INJECTION, SOLUTION INTRAVENOUS ONCE
Status: COMPLETED | OUTPATIENT
Start: 2020-10-19 | End: 2020-10-19

## 2020-10-19 RX ORDER — SODIUM CHLORIDE, SODIUM LACTATE, POTASSIUM CHLORIDE, CALCIUM CHLORIDE 600; 310; 30; 20 MG/100ML; MG/100ML; MG/100ML; MG/100ML
INJECTION, SOLUTION INTRAVENOUS CONTINUOUS
Status: DISCONTINUED | OUTPATIENT
Start: 2020-10-19 | End: 2020-10-19

## 2020-10-19 RX ORDER — POTASSIUM CHLORIDE 7.45 MG/ML
10 INJECTION INTRAVENOUS
Status: COMPLETED | OUTPATIENT
Start: 2020-10-19 | End: 2020-10-19

## 2020-10-19 RX ADMIN — METOCLOPRAMIDE 10 MG: 5 INJECTION, SOLUTION INTRAMUSCULAR; INTRAVENOUS at 20:13

## 2020-10-19 RX ADMIN — PIPERACILLIN AND TAZOBACTAM 3.38 G: 3; .375 INJECTION, POWDER, LYOPHILIZED, FOR SOLUTION INTRAVENOUS at 11:47

## 2020-10-19 RX ADMIN — Medication 10 ML: at 20:14

## 2020-10-19 RX ADMIN — SODIUM CHLORIDE, POTASSIUM CHLORIDE, SODIUM LACTATE AND CALCIUM CHLORIDE: 600; 310; 30; 20 INJECTION, SOLUTION INTRAVENOUS at 18:52

## 2020-10-19 RX ADMIN — IOHEXOL 50 ML: 240 INJECTION, SOLUTION INTRATHECAL; INTRAVASCULAR; INTRAVENOUS; ORAL at 14:54

## 2020-10-19 RX ADMIN — HYDROMORPHONE HYDROCHLORIDE 0.25 MG: 1 INJECTION, SOLUTION INTRAMUSCULAR; INTRAVENOUS; SUBCUTANEOUS at 04:43

## 2020-10-19 RX ADMIN — HYDROMORPHONE HYDROCHLORIDE 0.5 MG: 1 INJECTION, SOLUTION INTRAMUSCULAR; INTRAVENOUS; SUBCUTANEOUS at 20:13

## 2020-10-19 RX ADMIN — METHOCARBAMOL TABLETS 750 MG: 500 TABLET, COATED ORAL at 08:02

## 2020-10-19 RX ADMIN — POTASSIUM CHLORIDE 10 MEQ: 7.46 INJECTION, SOLUTION INTRAVENOUS at 08:03

## 2020-10-19 RX ADMIN — METOCLOPRAMIDE 10 MG: 5 INJECTION, SOLUTION INTRAMUSCULAR; INTRAVENOUS at 04:36

## 2020-10-19 RX ADMIN — PIPERACILLIN AND TAZOBACTAM 3.38 G: 3; .375 INJECTION, POWDER, LYOPHILIZED, FOR SOLUTION INTRAVENOUS at 18:52

## 2020-10-19 RX ADMIN — TAMSULOSIN HYDROCHLORIDE 0.4 MG: 0.4 CAPSULE ORAL at 08:02

## 2020-10-19 RX ADMIN — PIPERACILLIN AND TAZOBACTAM 3.38 G: 3; .375 INJECTION, POWDER, LYOPHILIZED, FOR SOLUTION INTRAVENOUS at 04:36

## 2020-10-19 RX ADMIN — POTASSIUM CHLORIDE 10 MEQ: 7.46 INJECTION, SOLUTION INTRAVENOUS at 09:01

## 2020-10-19 RX ADMIN — MAGNESIUM SULFATE HEPTAHYDRATE 2 G: 40 INJECTION, SOLUTION INTRAVENOUS at 08:02

## 2020-10-19 RX ADMIN — OXYCODONE 10 MG: 5 TABLET ORAL at 23:09

## 2020-10-19 RX ADMIN — DOCUSATE SODIUM 100 MG: 100 CAPSULE, LIQUID FILLED ORAL at 08:02

## 2020-10-19 RX ADMIN — METOCLOPRAMIDE 10 MG: 5 INJECTION, SOLUTION INTRAMUSCULAR; INTRAVENOUS at 08:02

## 2020-10-19 RX ADMIN — SODIUM CHLORIDE, POTASSIUM CHLORIDE, SODIUM LACTATE AND CALCIUM CHLORIDE: 600; 310; 30; 20 INJECTION, SOLUTION INTRAVENOUS at 08:02

## 2020-10-19 RX ADMIN — METHOCARBAMOL TABLETS 750 MG: 500 TABLET, COATED ORAL at 13:45

## 2020-10-19 ASSESSMENT — PAIN DESCRIPTION - LOCATION
LOCATION: ABDOMEN

## 2020-10-19 ASSESSMENT — PAIN DESCRIPTION - ONSET
ONSET: ON-GOING
ONSET: GRADUAL
ONSET: ON-GOING

## 2020-10-19 ASSESSMENT — PAIN DESCRIPTION - FREQUENCY
FREQUENCY: CONTINUOUS
FREQUENCY: CONTINUOUS
FREQUENCY: INTERMITTENT

## 2020-10-19 ASSESSMENT — PAIN DESCRIPTION - PAIN TYPE
TYPE: SURGICAL PAIN
TYPE: SURGICAL PAIN
TYPE: ACUTE PAIN;SURGICAL PAIN

## 2020-10-19 ASSESSMENT — PAIN SCALES - GENERAL
PAINLEVEL_OUTOF10: 0
PAINLEVEL_OUTOF10: 8
PAINLEVEL_OUTOF10: 0
PAINLEVEL_OUTOF10: 10
PAINLEVEL_OUTOF10: 5

## 2020-10-19 ASSESSMENT — PAIN DESCRIPTION - ORIENTATION
ORIENTATION: LOWER

## 2020-10-19 ASSESSMENT — PAIN DESCRIPTION - PROGRESSION
CLINICAL_PROGRESSION: GRADUALLY WORSENING

## 2020-10-19 ASSESSMENT — ENCOUNTER SYMPTOMS
NAUSEA: 1
ABDOMINAL PAIN: 0
RESPIRATORY NEGATIVE: 1
ABDOMINAL DISTENTION: 0
VOMITING: 0

## 2020-10-19 ASSESSMENT — PAIN DESCRIPTION - DESCRIPTORS
DESCRIPTORS: ACHING
DESCRIPTORS: ACHING;DISCOMFORT
DESCRIPTORS: ACHING

## 2020-10-19 NOTE — PROGRESS NOTES
Patient alert and oriented x4. VSS. Patient denies any pain at this time. Surgical sites are CDI. Patient NPO with exception of sips with meds. Patient has had two bowel movements but still has hypoactive bowel sounds. Patient Abdomen is distended and taut. IVF infusing. Patient urinating freely and without difficulty. Patient is UAL. Patient tolerating CT contrast. Patient denies any other needs at this time. Bed in the lowest position, bed side table within reach and call light within reach. Will continue to monitor for changes in status.

## 2020-10-19 NOTE — PLAN OF CARE
Problem: Pain:  Goal: Pain level will decrease  Description: Pain level will decrease  Outcome: Ongoing  Note: Patient c/o pain rated as 5 out of 10 Dilaudid given per protocol. Upon reassessment patient was asleep with RR >10. Will continue to monitor       Problem: Falls - Risk of:  Goal: Will remain free from falls  Description: Will remain free from falls  Outcome: Ongoing  Note: Patient remains free from physical injury. Patient ambulates independently in room. Patient in bed lowest position,wheels locked. 2/4 side rails up Call light and beside table within reach. Will continue to monitor       Problem: Infection - Surgical Site:  Goal: Will show no infection signs and symptoms  Description: Will show no infection signs and symptoms  Outcome: Ongoing  Note: CDI surgical sites MARTHA. No signs of infection noted. Will continue to monitor     Problem: Bowel/Gastric:  Goal: Control of bowel function will improve  Description: Control of bowel function will improve  Note: Abdomen assessed , distended hypoactive in all quadrants. No BM noted.  Will continue to monitor

## 2020-10-19 NOTE — PROGRESS NOTES
Patient alert and oriented. VSS Patient c/o pain Dilaudid given. CDI surgical sites. Lisipro not given ,order parameters not met. Patient in bed lowest position call light and bedside table within reach. All needs are met at this time. Patient aware to call if any help needed. Will continue to monitor  /73   Pulse 75   Temp 98.5 °F (36.9 °C) (Oral)   Resp 16   Ht 6' (1.829 m)   Wt 178 lb (80.7 kg)   SpO2 95%   BMI 24.14 kg/m²

## 2020-10-19 NOTE — PROGRESS NOTES
Progress Note    Admit Date: 10/8/2020  Diet: Diet NPO Effective Now Exceptions are: Ice Chips, Popsicles, Sips with Meds    CC: Abdominal pain, N/V    Interval history:Doing well this AM. Passing gas and bowel movements. Abdominal pain improved. Still having nausea with food and reduced appetite. Patient has been tolerating FLD, but having nausea. Has been transitioned to NPO. Will go for CT scan today with PO and IV contrast given prolonged nausea and sluggish bowel function.  -Await official results from CT scan. If all goes well patient should be able to discharge tomorrow. Will confirm with surgery.         Medications:     Scheduled Meds:   potassium chloride  10 mEq Intravenous Q1H    magnesium sulfate  2 g Intravenous Once    insulin lispro  0-6 Units Subcutaneous TID WC    insulin lispro  0-3 Units Subcutaneous Nightly    insulin lispro  2 Units Subcutaneous TID WC    insulin glargine  10 Units Subcutaneous Nightly    methocarbamol  750 mg Oral 4x Daily    docusate sodium  100 mg Oral Daily    senna  1 tablet Oral Nightly    polyethylene glycol  17 g Oral Daily    metoclopramide  10 mg Intravenous Q6H    tamsulosin  0.4 mg Oral Daily    sodium chloride flush  10 mL Intravenous 2 times per day    [Held by provider] enoxaparin  40 mg Subcutaneous Daily    influenza virus vaccine  0.5 mL Intramuscular Once    piperacillin-tazobactam  3.375 g Intravenous Q8H     Continuous Infusions:   lactated ringers 100 mL/hr at 10/19/20 0802    dextrose       PRN Meds:oxyCODONE **OR** oxyCODONE, HYDROmorphone **OR** HYDROmorphone, sodium chloride flush, promethazine **OR** ondansetron, glucose, dextrose, glucagon (rDNA), dextrose, dextrose    Objective:   Vitals:   T-max:  Patient Vitals for the past 8 hrs:   BP Temp Temp src Pulse Resp SpO2   10/19/20 0756 138/81 98.7 °F (37.1 °C) Oral 90 18 96 %   10/19/20 0430 133/70 98.5 °F (36.9 °C) Oral 80 18 96 %       Intake/Output Summary (Last 24 hours) at 10/19/2020 0850  Last data filed at 10/19/2020 0759  Gross per 24 hour   Intake 2670.58 ml   Output 100 ml   Net 2570.58 ml       Review of Systems   Constitutional: Positive for appetite change. HENT: Negative. Respiratory: Negative. Cardiovascular: Negative. Gastrointestinal: Positive for nausea. Negative for abdominal distention, abdominal pain and vomiting. Genitourinary: Negative. Musculoskeletal: Negative. Neurological: Negative. Hematological: Negative. Psychiatric/Behavioral: Negative. Physical Exam  Constitutional:       General: He is not in acute distress. Appearance: Normal appearance. Cardiovascular:      Rate and Rhythm: Normal rate and regular rhythm. Pulses: Normal pulses. Heart sounds: Normal heart sounds. Pulmonary:      Effort: Pulmonary effort is normal.      Breath sounds: Normal breath sounds. Abdominal:      General: Abdomen is flat. Bowel sounds are normal.      Palpations: Abdomen is soft. Comments: Incision C/D/I   Musculoskeletal: Normal range of motion. Skin:     General: Skin is warm. Capillary Refill: Capillary refill takes less than 2 seconds. Neurological:      General: No focal deficit present. Mental Status: He is alert. Mental status is at baseline.    Psychiatric:         Mood and Affect: Mood normal.         LABS:    CBC:   Recent Labs     10/17/20  0431 10/18/20  0413 10/19/20  0433   WBC 10.0 8.8 9.1   HGB 12.2* 11.7* 11.1*   HCT 36.7* 34.5* 33.7*    383 396   MCV 91.8 92.0 91.4     Renal:    Recent Labs     10/17/20  0431 10/18/20  0413 10/19/20  0433    139 140   K 3.9 3.9 3.8    104 104   CO2 27 25 24   BUN 12 11 11   CREATININE 1.2 1.1 1.1   GLUCOSE 172* 132* 130*   CALCIUM 9.1 9.0 8.9   MG 2.00 1.90 1.90   PHOS 2.9 3.1 2.8   ANIONGAP 11 10 12     Hepatic:   Recent Labs     10/17/20  0431 10/18/20  0413 10/19/20  0433   AST 12* 10* 9*   ALT 40 29 23   BILITOT 1.2* 1.5* 1.6*   BILIDIR 0.3 0.4* 0.4*   PROT 6.0* 5.8* 5.5*   LABALBU 3.1* 3.4 3.1*   ALKPHOS 206* 175* 156*     Troponin: No results for input(s): TROPONINI in the last 72 hours. BNP: No results for input(s): BNP in the last 72 hours. Lipids: No results for input(s): CHOL, HDL in the last 72 hours. Invalid input(s): LDLCALCU, TRIGLYCERIDE  ABGs:  No results for input(s): PHART, WDM0LMF, PO2ART, MXT6NKO, BEART, THGBART, Y4AFKPWW, AAW6CJR in the last 72 hours. INR: No results for input(s): INR in the last 72 hours. Lactate: No results for input(s): LACTATE in the last 72 hours. Cultures:  -----------------------------------------------------------------  RAD:   XR ABDOMEN (KUB) (SINGLE AP VIEW)   Final Result      1. Gaseous bowel distention. Bowel dilatation slightly increased from yesterday. Small bowel is slightly more disproportionately dilated. XR ABDOMEN (KUB) (SINGLE AP VIEW)   Final Result   1. There are dilated loops of small bowel. Air and stool present within nondilated colon. The findings may reflect partial small bowel obstruction. This could be better characterized with a contrast-enhanced CT scan of the abdomen and pelvis. The small    bowel dilatation is new from 10/8/2020. MRI ABDOMEN W WO CONTRAST MRCP   Final Result   1. Extensive gallbladder wall thickening with multiple signal void suggesting extensive cholelithiasis and possible impacted stones in the wall the gallbladder. Diffuse thickening with enhancement may reflect adenomyomatosis or chronic inflammation. Gallbladder neoplasm is not excluded. In general the degree of wall thickening appears to be improved in comparison to a previous CT exam from 2019.   2. Wall thickening and suspected inflammatory changes extend into the neck of the gallbladder and results in mild stenosis of the common hepatic duct. This does not result in significant ductal dilation within the liver. Neoplastic abnormality would be    difficult to exclude. 3. No evidence of choledocholithiasis. XR CHEST PORTABLE   Final Result      Bibasilar minimal linear atelectasis. FL ERCP BILIARY AND PANCREATIC S&I   Final Result      Intraoperative fluoroscopy was performed. Correlate with procedural note for additional information. US ABDOMEN LIMITED   Final Result      Nondiagnostic evaluation of the gallbladder which is obscured. No acute sonographic abnormality of the abdomen. .             CT ABDOMEN PELVIS WO CONTRAST Additional Contrast? None   Final Result      No acute abdominopelvic abnormality. Contracted gallbladder with questionable wall thickening and small nonspecific densities along the fundus. There also may be gallstones. As described on prior report, follow-up MRI/MRCP should be considered. No definite CT evidence of acute    cholecystitis. Prostatomegaly with circumferential bladder wall thickening, which may be secondary to bladder outlet obstruction versus cystitis. CT ABDOMEN PELVIS WO CONTRAST Additional Contrast? Oral    (Results Pending)       Assessment/Plan:     60M PMH T2DM, HLD, HTN, BPH presented 10/08 w/ abd pain + nausea/vomiting.      1. Choledocholithiasis  - CT-AP (10/08): Contracted gallbladder, possible wall thickening. Possible gallstones. - RUQ-US (10/09): No intrahepatic biliary duct dilation. CBD normal in diameter. Sonographic Layton's sign negative. - ERCP (10/09): CBD filling defect c/w choledocholithiasis. Mild CBD dilation.   - S/P Cholecystectomy (10/13). - Plan: Zosyn 3.375 mg QD. Will transition to augmentin at time of discharge. Continue abx until 10/23.     2. Post-op ileus  - KUB (10/16/20): Gaseous bowel distention, especially small bowel. - Plan: Advance diet as tolerated. Colace 100 mg QD. Senna 8.6 mg qPM. PEG 17 g QD.     3. Nausea  - Metoclopramide 10 mg q6h.     4. T2DM  - A1c 9.0 (10/10/20).   - Had taken metformin prior to admission.  - Plan: Lantus 10 units qPM. Lispro 3

## 2020-10-19 NOTE — PLAN OF CARE
Problem: Pain:  Goal: Pain level will decrease  Description: Pain level will decrease  10/19/2020 1029 by Colin Saunders RN  Outcome: Ongoing  Note: Patient denies any needs at this time. This nurse and pca have been frequently rounding on the patient. Bed is in the lowest position and call light is within reach. Will continue to monitor. Problem: Bowel/Gastric:  Goal: Control of bowel function will improve  Description: Control of bowel function will improve  10/19/2020 1029 by Colin Saunders RN  Outcome: Ongoing  Note: Patient abdomen is taut and distended with hypoactive bowel sounds. Patient states that he has had two bowel movement this shift and is passing gas. Patient denies any pain, n/v. Will continue to monitor. Problem: Skin Integrity:  Goal: Risk for impaired skin integrity will decrease  Description: Risk for impaired skin integrity will decrease  Outcome: Ongoing  Note: Patient surgical sites are CDI. No s/s of infection. Will continue to monitor.

## 2020-10-19 NOTE — PROGRESS NOTES
Hepatobiliary Surgery  Resident Daily Progress Note  Romana Large    CC: cholecystoduodenal fistula    Subjective :   Patient rested well overnight. Has been tolerating a full liquid diet, but admits to mild nausea and hiccups that is resolved with zofran. Denies an appetite this morning. The patient denies any BMs or passing flatus. The patient remains afebrile and hemodynamically stable. Objective    Infusions:   sodium chloride 75 mL/hr at 10/18/20 0808    dextrose          I/O:I/O last 3 completed shifts: In: 2535 [P.O.:480; I.V.:2055]  Out: -            Wt Readings from Last 1 Encounters:   10/08/20 178 lb (80.7 kg)                 LABS:   Recent Labs     10/17/20  0431 10/18/20  0413   WBC 10.0 8.8   HGB 12.2* 11.7*   HCT 36.7* 34.5*   MCV 91.8 92.0    383        Recent Labs     10/17/20  0431 10/18/20  0413    139   K 3.9 3.9    104   CO2 27 25   PHOS 2.9 3.1   BUN 12 11   CREATININE 1.2 1.1        Recent Labs     10/17/20  0431 10/18/20  0413   AST 12* 10*   ALT 40 29   BILIDIR 0.3 0.4*   BILITOT 1.2* 1.5*   ALKPHOS 206* 175*        No results for input(s): LIPASE, AMYLASE in the last 72 hours. Recent Labs     10/17/20  0431 10/18/20  0413   PROT 6.0* 5.8*        No results for input(s): CKTOTAL, CKMB, CKMBINDEX, TROPONINI in the last 72 hours.          Exam:  /70   Pulse 80   Temp 98.5 °F (36.9 °C) (Oral)   Resp 18   Ht 6' (1.829 m)   Wt 178 lb (80.7 kg)   SpO2 96%   BMI 24.14 kg/m²     CONSTITUTIONAL:  awake, alert & oriented x 3, cooperative, no apparent distress, and appears stated age  HEENT: No palpable lymphadenopathy, no icterus  LUNGS: No increased work of breathing  CV:  Regular rate and rhythm  ABDOMEN: Soft,  Appropriately tender, non-distended, incisions c/d/i with surgical glue in place, drain with minimal output    ASSESSMENT/PLAN:   Pt. is a 56 y.o. male with choledocholithiasis s/p ERCP 10/9 with sphincterotomy and stone extraction, POD#6 from a robotic assisted laparoscopic cholecystectomy with cholecystoduodenal fistula takedown, drainage of liver abscess and EGD. · NPO this morning, with mIVF @75 cc/hr  · Will plan for CT scan with PO and IV contrast given prolonged nausea and sluggish bowel function.    · Continue Zosyn for 10 days post-op (day 6/10)  · General surgery team to follow  · Continued medical management and glucose control per primary  · Anticipate discharge in 1 to 2 days, pending resolution of ileus      Isaias Hernandez DO  5:35 AM  10/19/2020   [phone]

## 2020-10-20 VITALS
HEART RATE: 81 BPM | HEIGHT: 72 IN | SYSTOLIC BLOOD PRESSURE: 123 MMHG | DIASTOLIC BLOOD PRESSURE: 76 MMHG | TEMPERATURE: 98.1 F | WEIGHT: 178 LBS | RESPIRATION RATE: 16 BRPM | BODY MASS INDEX: 24.11 KG/M2 | OXYGEN SATURATION: 99 %

## 2020-10-20 LAB
ALBUMIN SERPL-MCNC: 2.8 G/DL (ref 3.4–5)
ALP BLD-CCNC: 145 U/L (ref 40–129)
ALT SERPL-CCNC: 18 U/L (ref 10–40)
ANION GAP SERPL CALCULATED.3IONS-SCNC: 13 MMOL/L (ref 3–16)
AST SERPL-CCNC: 10 U/L (ref 15–37)
BASOPHILS ABSOLUTE: 0.1 K/UL (ref 0–0.2)
BASOPHILS RELATIVE PERCENT: 0.8 %
BILIRUB SERPL-MCNC: 1.7 MG/DL (ref 0–1)
BILIRUBIN DIRECT: 0.4 MG/DL (ref 0–0.3)
BILIRUBIN, INDIRECT: 1.3 MG/DL (ref 0–1)
BUN BLDV-MCNC: 9 MG/DL (ref 7–20)
CALCIUM SERPL-MCNC: 8.6 MG/DL (ref 8.3–10.6)
CHLORIDE BLD-SCNC: 106 MMOL/L (ref 99–110)
CO2: 21 MMOL/L (ref 21–32)
CREAT SERPL-MCNC: 1 MG/DL (ref 0.8–1.3)
EOSINOPHILS ABSOLUTE: 0.5 K/UL (ref 0–0.6)
EOSINOPHILS RELATIVE PERCENT: 5.1 %
GFR AFRICAN AMERICAN: >60
GFR NON-AFRICAN AMERICAN: >60
GLUCOSE BLD-MCNC: 101 MG/DL (ref 70–99)
GLUCOSE BLD-MCNC: 159 MG/DL (ref 70–99)
GLUCOSE BLD-MCNC: 69 MG/DL (ref 70–99)
GLUCOSE BLD-MCNC: 70 MG/DL (ref 70–99)
GLUCOSE BLD-MCNC: 75 MG/DL (ref 70–99)
GLUCOSE BLD-MCNC: 83 MG/DL (ref 70–99)
HCT VFR BLD CALC: 32.3 % (ref 40.5–52.5)
HEMOGLOBIN: 10.7 G/DL (ref 13.5–17.5)
LYMPHOCYTES ABSOLUTE: 1.9 K/UL (ref 1–5.1)
LYMPHOCYTES RELATIVE PERCENT: 19.1 %
MAGNESIUM: 1.8 MG/DL (ref 1.8–2.4)
MCH RBC QN AUTO: 30.8 PG (ref 26–34)
MCHC RBC AUTO-ENTMCNC: 33.2 G/DL (ref 31–36)
MCV RBC AUTO: 92.8 FL (ref 80–100)
MONOCYTES ABSOLUTE: 0.8 K/UL (ref 0–1.3)
MONOCYTES RELATIVE PERCENT: 7.9 %
NEUTROPHILS ABSOLUTE: 6.6 K/UL (ref 1.7–7.7)
NEUTROPHILS RELATIVE PERCENT: 67.1 %
PDW BLD-RTO: 12.7 % (ref 12.4–15.4)
PERFORMED ON: ABNORMAL
PERFORMED ON: NORMAL
PERFORMED ON: NORMAL
PHOSPHORUS: 2.7 MG/DL (ref 2.5–4.9)
PLATELET # BLD: 439 K/UL (ref 135–450)
PMV BLD AUTO: 7.4 FL (ref 5–10.5)
POTASSIUM SERPL-SCNC: 3.5 MMOL/L (ref 3.5–5.1)
RBC # BLD: 3.48 M/UL (ref 4.2–5.9)
SODIUM BLD-SCNC: 140 MMOL/L (ref 136–145)
TOTAL PROTEIN: 5.4 G/DL (ref 6.4–8.2)
WBC # BLD: 9.9 K/UL (ref 4–11)

## 2020-10-20 PROCEDURE — 2580000003 HC RX 258: Performed by: STUDENT IN AN ORGANIZED HEALTH CARE EDUCATION/TRAINING PROGRAM

## 2020-10-20 PROCEDURE — 6360000002 HC RX W HCPCS: Performed by: STUDENT IN AN ORGANIZED HEALTH CARE EDUCATION/TRAINING PROGRAM

## 2020-10-20 PROCEDURE — 83735 ASSAY OF MAGNESIUM: CPT

## 2020-10-20 PROCEDURE — 6370000000 HC RX 637 (ALT 250 FOR IP): Performed by: STUDENT IN AN ORGANIZED HEALTH CARE EDUCATION/TRAINING PROGRAM

## 2020-10-20 PROCEDURE — 85025 COMPLETE CBC W/AUTO DIFF WBC: CPT

## 2020-10-20 PROCEDURE — 6370000000 HC RX 637 (ALT 250 FOR IP): Performed by: INTERNAL MEDICINE

## 2020-10-20 PROCEDURE — 6370000000 HC RX 637 (ALT 250 FOR IP)

## 2020-10-20 PROCEDURE — 80076 HEPATIC FUNCTION PANEL: CPT

## 2020-10-20 PROCEDURE — 80069 RENAL FUNCTION PANEL: CPT

## 2020-10-20 PROCEDURE — 6360000002 HC RX W HCPCS: Performed by: NURSE PRACTITIONER

## 2020-10-20 PROCEDURE — 36415 COLL VENOUS BLD VENIPUNCTURE: CPT

## 2020-10-20 RX ORDER — POTASSIUM CHLORIDE 20 MEQ/1
40 TABLET, EXTENDED RELEASE ORAL ONCE
Status: COMPLETED | OUTPATIENT
Start: 2020-10-20 | End: 2020-10-20

## 2020-10-20 RX ORDER — MAGNESIUM SULFATE IN WATER 40 MG/ML
2 INJECTION, SOLUTION INTRAVENOUS ONCE
Status: COMPLETED | OUTPATIENT
Start: 2020-10-20 | End: 2020-10-20

## 2020-10-20 RX ORDER — METOCLOPRAMIDE 10 MG/1
10 TABLET ORAL
Qty: 120 TABLET | Refills: 0 | Status: SHIPPED | OUTPATIENT
Start: 2020-10-20

## 2020-10-20 RX ADMIN — INSULIN LISPRO 2 UNITS: 100 INJECTION, SOLUTION INTRAVENOUS; SUBCUTANEOUS at 13:03

## 2020-10-20 RX ADMIN — POTASSIUM CHLORIDE 40 MEQ: 1500 TABLET, EXTENDED RELEASE ORAL at 12:58

## 2020-10-20 RX ADMIN — DOCUSATE SODIUM 100 MG: 100 CAPSULE, LIQUID FILLED ORAL at 09:52

## 2020-10-20 RX ADMIN — METOCLOPRAMIDE 10 MG: 5 INJECTION, SOLUTION INTRAMUSCULAR; INTRAVENOUS at 09:52

## 2020-10-20 RX ADMIN — ENOXAPARIN SODIUM 40 MG: 40 INJECTION SUBCUTANEOUS at 09:52

## 2020-10-20 RX ADMIN — METOCLOPRAMIDE 10 MG: 5 INJECTION, SOLUTION INTRAMUSCULAR; INTRAVENOUS at 03:50

## 2020-10-20 RX ADMIN — POLYETHYLENE GLYCOL (3350) 17 G: 17 POWDER, FOR SOLUTION ORAL at 09:54

## 2020-10-20 RX ADMIN — TAMSULOSIN HYDROCHLORIDE 0.4 MG: 0.4 CAPSULE ORAL at 09:52

## 2020-10-20 RX ADMIN — Medication 10 ML: at 09:53

## 2020-10-20 RX ADMIN — PIPERACILLIN AND TAZOBACTAM 3.38 G: 3; .375 INJECTION, POWDER, LYOPHILIZED, FOR SOLUTION INTRAVENOUS at 03:48

## 2020-10-20 RX ADMIN — MAGNESIUM SULFATE HEPTAHYDRATE 2 G: 40 INJECTION, SOLUTION INTRAVENOUS at 12:58

## 2020-10-20 RX ADMIN — INSULIN LISPRO 1 UNITS: 100 INJECTION, SOLUTION INTRAVENOUS; SUBCUTANEOUS at 13:03

## 2020-10-20 NOTE — DISCHARGE SUMMARY
VSS. Pt denies pain. Tolerated dinner tray well. Pt voiding without issue. IV antibiotics given as ordered. No needs at this time. Will continue to monitor.   Electronically signed by Nazanin Holt RN on 10/11/20 at 3:22 AM EDT
Comments: Hepatic drain in place   Musculoskeletal: Normal range of motion. Skin:     General: Skin is warm. Capillary Refill: Capillary refill takes less than 2 seconds. Neurological:      General: No focal deficit present. Mental Status: He is alert. Mental status is at baseline. Psychiatric:         Mood and Affect: Mood normal.          Significant Diagnostic Studies:   Disposition: home  Discharged Condition: Stable  Follow Up: Primary Care Physician in one week    DISCHARGE MEDICATION:     Medication List      START taking these medications    docusate 100 MG Caps  Commonly known as:  COLACE, DULCOLAX  Take 100 mg by mouth 2 times daily     methocarbamol 750 MG tablet  Commonly known as:  ROBAXIN  Take 1 tablet by mouth 4 times daily for 10 days     metoclopramide 10 MG tablet  Commonly known as:  Reglan  Take 1 tablet by mouth 4 times daily (before meals and nightly)     oxyCODONE-acetaminophen 5-325 MG per tablet  Commonly known as:  Percocet  Take 1 tablet by mouth every 6 hours as needed for Pain for up to 7 days.      tamsulosin 0.4 MG capsule  Commonly known as:  FLOMAX  Take 1 capsule by mouth daily for 10 days        CONTINUE taking these medications    metFORMIN 1000 MG tablet  Commonly known as:  GLUCOPHAGE  TAKE 1 TABLET BY MOUTH TWICE DAILY WITH MEALS FOR DIABETES     therapeutic multivitamin-minerals tablet           Where to Get Your Medications      You can get these medications from any pharmacy    Bring a paper prescription for each of these medications  · docusate 100 MG Caps  · methocarbamol 750 MG tablet  · metoclopramide 10 MG tablet  · oxyCODONE-acetaminophen 5-325 MG per tablet  · tamsulosin 0.4 MG capsule       Activity: activity as tolerated  Diet: diabetic diet  Wound Care: keep wound clean and dry    Time Spent on discharge is more than 45 minutes      Signed:  Beulah Nino MD, PGY-1  10/20/2020

## 2020-10-20 NOTE — PROGRESS NOTES
Pt alert and oriented. VSS. Pt reporting abdominal pain that is being controlled with ordered pain medication, see MAR. Pt had emesis at beginning of shift, surgical residents notified. Pt receiving intermittent ABX, see MAR. Pt has call light within reach, bed in lowest position with wheels locked, 2/4 side rails up, and pt is up ad aubrey. Will continue to monitor.

## 2020-10-20 NOTE — PROGRESS NOTES
CLINICAL PHARMACY NOTE: MEDS TO [address] [place] [address] Drive Select Patient?: No  Total # of Prescriptions Filled: 6   The following medications were delivered to the patient:  · Oxycodone-acetaminophen 5/325mg  · Methocarbamol 750  · Docusate 100mg  · tamsulosin 0.4mg  · Metoclopramide 10mg  Total # of Interventions Completed: 1  Time Spent (min): 60    Additional Documentation:

## 2020-10-20 NOTE — PLAN OF CARE
Problem: MOBILITY  Goal: Early mobilization is achieved  Note: Patient is up ambulating in his room. Has ambulated once in the hallways. Problem: Skin Integrity:  Goal: Risk for impaired skin integrity will decrease  Description: Risk for impaired skin integrity will decrease  Note: The patient surgical sites are clean dry and intact. Problem: Skin Integrity:  Goal: Risk for impaired skin integrity will decrease  Description: Risk for impaired skin integrity will decrease  Note: The patient surgical sites are clean dry and intact.

## 2020-10-20 NOTE — DISCHARGE INSTR - COC
Continuity of Care Form    Patient Name: Marylen Ko   :  1960  MRN:  [id number]    Admit date:  10/8/2020  Discharge date:  ***    Code Status Order: Full Code   Advance Directives:   Advance Care Flowsheet Documentation       Date/Time Healthcare Directive Type of Healthcare Directive Copy in [address] Agent's Name Healthcare Agent's Phone Number    10/09/20 1537  No, patient does not have an advance directive for healthcare treatment -- -- -- -- --    10/09/20 0038  No, patient does not have an advance directive for healthcare treatment -- -- -- -- --            Admitting Physician:  Trinity Brennan DO  PCP: John Gann MD    Discharging Nurse: [facility] Unit/Room#: 3952/3792-54  Discharging Unit Phone Number: ***    Emergency Contact:   Extended Emergency Contact Information  Primary Emergency Contact: Ayush Kyleard   Home Phone: [phone]  Relation: Parent  Secondary Emergency Contact: [address] [place] [address] Drive Phone: [phone]  Relation: Spouse    Past Surgical History:  Past Surgical History:   Procedure Laterality Date    CHOLECYSTECTOMY, LAPAROSCOPIC N/A 10/13/2020    ROBOTIC ASSISTED LAPAROSCOPIC CHOLECYSTECTOMY WITH CHOLECYSTODUODENAL FISTULA TAKEDOWN, DRAINAGE OF LIVER ABCESS, ESOPHAGOGASTRODUODENOSCOPY performed by Alyssia Peraza MD at [facility]      ERCP  10/9/2020    ERCP STONE REMOVAL performed by Pramod Starks MD at [address]  10/9/2020    ERCP SPHINCTER/PAPILLOTOMY performed by Pramod Starks MD at [address] 11/10/2017    OPERATIVE FIXATION OF LEFT RING FINGER PROXIMAL PHALANX AND LEFT SMALL Stradone [address].  INCLUDING OPEN REDUCTION INTERNAL FIXATION    LAPAROSCOPIC APPENDECTOMY      TURP N/A 2019    CYSTOSCOPY TRANSURETHRAL RESECTION PROSTATE performed by Luis Navarro DO at [address] 10/9/2020    EGD DIAGNOSTIC ADLs:275110354:::0}  Feeding  {CHP DME ADLs:957241864:::0}  Med Admin  {CHP DME ADLs:391607791:::0}  Med Delivery   { BUD MED Delivery:732390017:::0}    Wound Care Documentation and Therapy:        Elimination:  Continence: Bowel: {YES / TD:44104}  Bladder: {YES / FF:37106}  Urinary Catheter: {Urinary Catheter:215182530:::0}   Colostomy/Ileostomy/Ileal Conduit: {YES / NA:86196}       Date of Last BM: ***    Intake/Output Summary (Last 24 hours) at 10/20/2020 1408  Last data filed at 10/20/2020 0946  Gross per 24 hour   Intake 635 ml   Output --   Net 635 ml     I/O last 3 completed shifts:   In: 65 [P.O.:90; I.V.:825]  Out: 100 [Urine:100]    Safety Concerns:     [address] Safety Concerns:334615139:::0}    Impairments/Disabilities:      [address] Impairments/Disabilities:218049995:::0}    Nutrition Therapy:  Current Nutrition Therapy:   [address] Diet List:042363554:::0}    Routes of Feeding: {[facility] DME Other Feedings:555362198:::0}  Liquids: {Slp liquid thickness:66743}  Daily Fluid Restriction: {CHP DME Yes amt example:454752511:::0}  Last Modified Barium Swallow with Video (Video Swallowing Test): {Done Not Done MTTN:855393131:::5}    Treatments at the Time of Hospital Discharge:   Respiratory Treatments: ***  Oxygen Therapy:  {Therapy; copd oxygen:72355:::0}  Ventilator:    { CC Vent List:427486822:::0}    Rehab Therapies: {THERAPEUTIC INTERVENTION:3963554159}  Weight Bearing Status/Restrictions: [address] [employer] Weight Bearin:::0}  Other Medical Equipment (for information only, NOT a DME order):  {EQUIPMENT:239139418}  Other Treatments: ***    Patient's personal belongings (please select all that are sent with patient):  {P DME Belongings:316633706:::0}    RN SIGNATURE:  {Esignature:484548579:::0}    CASE MANAGEMENT/SOCIAL WORK SECTION    Inpatient Status Date: ***    Readmission Risk Assessment Score:  Readmission Risk              Risk of Unplanned Readmission:        15           Discharging to Facility/

## 2020-10-20 NOTE — PROGRESS NOTES
Patient alert and oriented times 4 with stable VS.  Nurse encouraged patient to ambulate in the hallways and to use his IS. No bowel tones audible. Pt BG was 70 this morning. Nurse gave patient orange juice. Recheck came back at 76. Pt ate breakfast and BG was 101. Will continue to monitor.   Electronically signed by Anuja Arboleda RN on 10/20/2020 at 11:26 AM

## 2020-10-20 NOTE — PROGRESS NOTES
Patient brother picked up meds to beds. Nurse gave pt discharge instructions and went over medications with pt. Pt acknowledged understanding of instructions and medications/side effects. Pt taken via wheelchair to private vehicle for discharge home.   Electronically signed by Grace Benavides RN on 10/20/2020 at 5:22 PM

## 2020-10-20 NOTE — PROGRESS NOTES
NUTRITION ASSESSMENT  Admission Date: 10/8/2020     Type and Reason for Visit: Reassess    NUTRITION RECOMMENDATIONS:   1. PO Diet: continue current general carb control diet      NUTRITION ASSESSMENT:  Pt is at nutritional compromise r/t NPO or CLD/FLD x7 days. Pt w/ choledocholithiasis s/p ERCP 10/9 with sphincterotomy and stone extraction, s/p 10/13 laparoscopic cholecystectomy with cholecystoduodenal fistula takedown, drainage of liver abscess and EGD. Pt tolerated FLD and was advanced to carb control x3 diet today. Pt reports good appetite today and consumed % of his breakfast this morning with no reported issues. RD student verbally advised pt on low fat diet following cholecystectomy. Pt voiced understanding. Will continue to monitor for diet tolerance. MALNUTRITION ASSESSMENT  Due to current CDC guidelines recommending 6-ft distancing for social isolation for COVID19 prevention, physical aspects of the malnutrition assessment were withheld at this time. Context of Malnutrition: Acute Illness   Malnutrition Status: At risk for malnutrition (Comment)  Findings of the 6 clinical characteristics of malnutrition (Minimum of 2 out of 6 clinical characteristics is required to make the diagnosis of moderate or severe Protein Calorie Malnutrition based on AND/ASPEN Guidelines):  Energy Intake: Less than/equal to 50% of estimated energy requirements    Energy Intake Time: Greater than or equal to 7 days    Weight Loss %: No significant loss   Body Fat Loss: Unable to Assess   Body Fat Location: Unable to assess    Body Muscle Loss: Unable to Assess   Body Muscle Loss Location: Unable to assess    Fluid Accumulation: No significant    Fluid Accumulation Location: No significant     Strength: Not Performed; Not Measured     NUTRITION DIAGNOSIS   Problem: Problem #1: Inadequate oral intake  Etiology:  Altered GI function  Signs & Symptoms: diet now advanced (will monitor intake) and NPO status due to medical condition    NUTRITION INTERVENTION  Food and/or Nutrient Delivery:Continue Current diet   Nutrition education/counseling/coordination of care: Continue Inpatient Monitoring     NUTRITION MONITORING & EVALUATION:  Evaluation:Progressing towards goal   Goals:Goals: Pt will tolerate diet adv and consume >50% of meals  Monitoring: Diet Tolerance , GI Function , Meal Intake  or Weight      OBJECTIVE DATA:  · Nutrition-Focused Physical Findings: BM 10/16, bowel regimen  · Wounds Surgical Wound      Past Medical History:   Diagnosis Date    Arthritis     BPH (benign prostatic hyperplasia)     BPH (benign prostatic hypertrophy) 9/2/2015    Clostridium difficile infection 10/24/2016    Diabetes mellitus ([facility] [address] 2012    Elevated LDL cholesterol level 1/27/2015    Essential hypertension, benign 1/27/2015    S/P colonoscopy 2011    Nml per pt'.     Wears glasses         ANTHROPOMETRICS  Current Height: 6' (182.9 cm)  Current Weight: 178 lb (80.7 kg)    Admission weight: 178 lb (80.7 kg) standing scale  Ideal Bodyweight 178 lb   Usual Bodyweight ~185-199   Weight Changes 3.7% loss from UBW       BMI BMI (Calculated): 24.2    Wt Readings from Last 50 Encounters:   10/08/20 178 lb (80.7 kg)   04/08/20 198 lb (89.8 kg)   02/26/20 198 lb 3.2 oz (89.9 kg)   12/08/19 192 lb 7.4 oz (87.3 kg)   11/26/19 192 lb 8 oz (87.3 kg)   10/21/19 188 lb (85.3 kg)   09/11/19 183 lb 3.2 oz (83.1 kg)   09/05/19 184 lb 8 oz (83.7 kg)     COMPARATIVE STANDARDS  Estimated Total Kcals/Day : 25-30  Current Bodyweight (81 kg) 5816-0674 kcal    Estimated Total Protein (g/day) : 1.2-1.4 Current Bodyweight (81 kg) 97-113g/day  Estimated Daily Total Fluid (ml/day): 5483-9813 mL per day     Food / Nutrition-Related History  Pre-Admission / Home Diet:  Pre-Admission/Home Diet: General   Home Supplements / Herbals:    none noted  Food Restrictions / Cultural Requests:    none noted    Current Nutrition Therapies   DIET GENERAL; Carb Control: 3 carb choices (45 gms)/meal     PO Intake: %  PO Supplement: None   IVF: none    NUTRITION RISK LEVEL: Risk Level: [address]:  [phone]  Office:  [phone]

## 2020-10-20 NOTE — PLAN OF CARE
Nutrition Problem #1: Inadequate oral intake  Intervention: Food and/or Nutrient Delivery: Continue Current Diet  Nutritional Goals: Pt will tolerate diet adv and consume >50% of meals

## 2020-10-20 NOTE — PROGRESS NOTES
Hepatobiliary Surgery  Resident Daily Progress Note  Delta Hernandez    CC: cholecystoduodenal fistula    Subjective :   Patient rested well overnight. Has been tolerating full liquid with one episode of emesis last night. Had one BM yesterday. He otherwise remains afebrile and hemodynamically stable. Denies nausea, denies hiccups. Objective    Infusions:   dextrose          I/O:I/O last 3 completed shifts: In: 1621.2 [P.O.:90; I.V.:1531.2]  Out: 100 [Urine:100]           Wt Readings from Last 1 Encounters:   10/08/20 178 lb (80.7 kg)                 LABS:   Recent Labs     10/18/20  0413 10/19/20  0433   WBC 8.8 9.1   HGB 11.7* 11.1*   HCT 34.5* 33.7*   MCV 92.0 91.4    396        Recent Labs     10/18/20  0413 10/19/20  0433    140   K 3.9 3.8    104   CO2 25 24   PHOS 3.1 2.8   BUN 11 11   CREATININE 1.1 1.1        Recent Labs     10/18/20  0413 10/19/20  0433   AST 10* 9*   ALT 29 23   BILIDIR 0.4* 0.4*   BILITOT 1.5* 1.6*   ALKPHOS 175* 156*        No results for input(s): LIPASE, AMYLASE in the last 72 hours. Recent Labs     10/18/20  0413 10/19/20  0433   PROT 5.8* 5.5*        No results for input(s): CKTOTAL, CKMB, CKMBINDEX, TROPONINI in the last 72 hours.          Exam:  /61   Pulse 73   Temp 98.2 °F (36.8 °C) (Oral)   Resp 18   Ht 6' (1.829 m)   Wt 178 lb (80.7 kg)   SpO2 96%   BMI 24.14 kg/m²     CONSTITUTIONAL:  awake, alert & oriented x 3, cooperative, no apparent distress, and appears stated age  HEENT: No palpable lymphadenopathy, no icterus  LUNGS: No increased work of breathing  CV:  Regular rate and rhythm  ABDOMEN: Soft,  Appropriately tender, non-distended, incisions c/d/i with surgical glue in place, drain with minimal output    ASSESSMENT/PLAN:   Pt. is a 56 y.o. male with choledocholithiasis s/p ERCP 10/9 with sphincterotomy and stone extraction, POD#7 from a robotic assisted laparoscopic cholecystectomy with cholecystoduodenal fistula takedown, drainage of liver abscess and EGD. · Start general diet today. · CT scan with PO and IV contrast done 10/19, with mild diffuse distension of large and small bowel. · Continue Zosyn for 10 days post-op (day 7/10)  · General surgery team to follow  · Continued medical management and glucose control per primary  · If patient tolerates his general diet today he will be okay for discharge today. Recommend discharge with Reglan.        Roseann Valenzuela, [place]  6:09 AM  10/20/2020   [phone]

## 2020-10-20 NOTE — PLAN OF CARE
Problem: Falls - Risk of:  Goal: Will remain free from falls  Description: Will remain free from falls  Outcome: Ongoing   Pt has non skid socks on, call light within reach, bed in lowest position with wheels locked, 2/4 side rails up, and pt is up ad aubrey.

## 2020-10-23 ENCOUNTER — TELEPHONE (OUTPATIENT)
Dept: SURGERY | Age: 60
End: 2020-10-23

## 2020-10-23 NOTE — OP NOTE
Operative Note      Patient: Josh Armando  YOB: 1960  MRN: [id number]    Date of Procedure: 10/13/2020    Pre-Op Diagnosis: Cholecystitis [K81.9] Severe    Post-Op Diagnosis: Same, Liver abscess, cholecystoduodenal fistula       Procedure(s):  ROBOTIC ASSISTED LAPAROSCOPIC CHOLECYSTECTOMY WITH CHOLECYSTODUODENAL FISTULA TAKEDOWN, DRAINAGE OF LIVER ABCESS, ESOPHAGOGASTRODUODENOSCOPY    Surgeon(s):  Supriya Byers MD    Assistant:   Surgical Assistant: Jenney Pia Holter  Resident: Geovanna Luna MD    Anesthesia: General    Estimated Blood Loss (mL): 623 ml    Complications: None    Specimens:   ID Type Source Tests Collected by Time Destination   A : Peritoneal Nodule Tissue Tissue SURGICAL PATHOLOGY Supriya Byers MD 10/13/2020 1058    B : Gallbladder and Stones Tissue Gallbladder SURGICAL PATHOLOGY Supriya Byers MD 10/13/2020 1312      Operative findings: Extensive inflammation in the right upper quadrant. Entire omentum, colon, and duodenum was stuck to liver and gallbladder. Extensive lysis of adhesions of more than 90 minutes was done to even identify all anatomy. Firefly imaging with ICG need to help a lot. Cholecystoduodenal fistula was taken down and duodenum was repaired. Gallbladder was acutely inflamed. There was a purulence pocket going into the liver and liver abscess was drained well. Operative indications: Patient presented to hospital with right upper quadrant pain and nausea. Further work-up showed likely acute cholecystitis and elevated LFTs. ERCP showed bile duct stones and were removed. CT scan was concerning for cholecystoduodenal fistula and extensive inflammation in the right upper quadrant. Robotic-assisted laparoscopic cholecystectomy with intraoperative cholangiogram possible open explained. Risks, benefits & alternatives of surgery explained and patient wishes to proceed with surgery.   Possible cholecystoduodenal fistula and need for duodenal repair discussed. Operative procedure: The patient was brought from floor and placed in a supine position. After induction of anesthesia the abdomen was prepped and draped in a sterile manner. Timeout procedure was performed including giving of antibiotics. Local anesthetic was injected at the umbilicus and a small midline incision given. Peritoneal cavity entered by Optiview technique. A 5 mm trocar canula placed into abdominal cavity without difficulty and under direct visualization. The abdomen was insufflated to 15 mm of Hg pressure. The other standard operating trocars were placed under direct visualization. Patient cart of the robotic system was docked. Above operative findings noted. With sharp dissection as omental adhesions from anterior abdominal wall and from falciform ligament were lysed. A nodular focus was found in the peritoneum and was excised completely and sent to pathology. This was measuring approximately 1 cm. I spent almost next 90 minutes trying to identify anatomy and  all the adhesions sharply. First carefully, colon was  from the liver and gallbladder. Hepatic flexure colon was identified and followed medially. Sharp dissection was done and colon was  from the liver and gallbladder. In the process I did separate some of the omentum and created a omental pedicle to be used later. At this point I did a careful dissection to separate pylorus of stomach from the liver. After careful dissection was able to see ryan hepatis. Careful sharp dissection was done isolating duodenum from gallbladder. At one place, it was very tight it was likely site of cholecystoduodenal fistula. This was divided sharply with scissors. The duodenal side of this was repaired with figure of 8 silk suture. In an earlier dissection and also during this dissection, I found gallstones all over this area indicating likely rupture of the gallbladder.   All of the stones were carefully removed. No careful dissection was done and gallbladder was  from the ryan hepatis. There was no clear Calot's triangle anatomy. Further dissection was done with the help of firefly ICG imaging to identify the liver and bile duct. Peritoneum over Calot's triangle incised and dissection done. Multiple fibrous strands were divided carefully. Cystic artery was controlled with bipolar device and divided. Still, I could not clearly identify the Calot's triangle. At this point, I decided to start with fundus first approach. I was able to separate some of the gallbladder. Posteriorly, most of the gallbladder wall was inflamed and in few places very stuck to the liver. At this point I decided to take out as much as I can. There was some wall left where it was stuck to the common hepatic duct. Mucosa was cauterized. I could not definitely identify where the cystic duct was. There was no bile coming out and thus decided not even to place any suture. Thorough suction irrigation was done. During this I noted there was some purulent fluid coming from the liver fossa. Exploration was done into the liver and a small abscess cavity was drained. Thorough irrigation was done. EGD was done. Stomach and duodenum was evaluated. Insufflation was done and no air leak was noted at duodenal repair site. A well prepared omental flap was placed in this area to prevent any future colon and duodenal adhesions as there is a possibility we may be even dealing with gallbladder cancer. A drain placed. The patient cart was undocked. Gallbladder was removed by placing in a bag from the trocar site with out difficulty. Irrigation was performed, hemostasis was assured, the trocars were removed. The specimen removal port site is closed with 0' vicryl suture. All the port sites skin is closed with 4-0 monocryl in subcuticular manner. Skin glue applied. Sponge & needle count correct at the end of procedure. The patient tolerated the procedure well and was transferred to the PACU in stable condition. I was present for the entire procedure. Intraop Fluoroscopy: I interpreted cholangiogram while its done and my decisions are based on my interpretation.      Galilea Munoz MD  Surgical Oncologist  Department of [address] [place] [address]

## 2020-10-23 NOTE — TELEPHONE ENCOUNTER
Pt had surgery on 10.13.20 ROBOTIC CHOLECYSTECTOMY LAPAROSCOPIC AND CHOLANGIOGRAM     Pt said yesterday there was a small amount of clear liquid seeping out where the drain tube was-this morning just a little bit  No pain in the area  He would like to know if this is normal?  Please advise     Pt has post op Tuesday 10.27

## 2020-10-23 NOTE — TELEPHONE ENCOUNTER
2nd message left for pt informing himthat a small amount of clear drainage is OK. Patient informed to contact our office if he develops fever, nausea, vomiting or pain.

## 2020-10-23 NOTE — TELEPHONE ENCOUNTER
Message left for patient informing him that a small amount of clear drainage is OK. Patient informed to contact our office if he develops fever, nausea, vomiting or pain.

## 2020-10-26 NOTE — PROGRESS NOTES
Gerhardt Pupa is here for postop evaluation. He is gradually improving since then. Minimal pain now. Tolerating diet and having normal BM's. No other complaints. O/E: Alert, oriented x 3, sitting comfortably with out any discomfort  No respiratory distress  Abdomen - soft, non-tender and no distension  Postop wound - healing well. Path - FINAL DIAGNOSIS:        A. Peritoneal nodule:      - Benign-appearing lymph node.        B. Gallbladder and stones:      - Florid acute and chronic cholecystitis with extensive mucosal        erosion.      - Cholelithiasis     A/P: S/P Robotic  Assisted Laparoscopic Cholecystectomy With Cholecystoduodenal Fistula Takedown Drainage Of Liver Abscess, Esophagogastroduodenoscopy 10/13/20, doing well  No postop complications  Path as above reviewed with the patient  Follow up as needed    Hong Reyna MD  Surgery Attending    I, Mauricio Guo RN, am scribing for and in the presence of Dr Hong Reyna. Mauricio Guo RN  I, Dr. Hong Reyna, personally performed the services described in this documentation as scribed by Mauricio Guo RN in my presence, and it is both accurate and complete.      Hong Reyna MD  Surgery Attending

## 2020-10-27 ENCOUNTER — OFFICE VISIT (OUTPATIENT)
Dept: SURGERY | Age: 60
End: 2020-10-27

## 2020-10-27 VITALS
DIASTOLIC BLOOD PRESSURE: 78 MMHG | OXYGEN SATURATION: 98 % | HEIGHT: 73 IN | SYSTOLIC BLOOD PRESSURE: 128 MMHG | RESPIRATION RATE: 16 BRPM | HEART RATE: 61 BPM | BODY MASS INDEX: 23.06 KG/M2 | WEIGHT: 174 LBS | TEMPERATURE: 96.6 F

## 2020-10-27 PROCEDURE — 99024 POSTOP FOLLOW-UP VISIT: CPT | Performed by: SURGERY

## 2020-11-08 PROBLEM — Z01.818 PRE-OP EVALUATION: Status: RESOLVED | Noted: 2020-10-09 | Resolved: 2020-11-08

## 2022-10-31 ENCOUNTER — HOSPITAL ENCOUNTER (EMERGENCY)
Age: 62
Discharge: HOME | End: 2022-10-31
Attending: STUDENT IN AN ORGANIZED HEALTH CARE EDUCATION/TRAINING PROGRAM

## 2022-10-31 VITALS
WEIGHT: 175.44 LBS | RESPIRATION RATE: 14 BRPM | HEIGHT: 72 IN | TEMPERATURE: 97.8 F | OXYGEN SATURATION: 99 % | HEART RATE: 81 BPM | BODY MASS INDEX: 23.76 KG/M2 | DIASTOLIC BLOOD PRESSURE: 81 MMHG | SYSTOLIC BLOOD PRESSURE: 127 MMHG

## 2022-10-31 DIAGNOSIS — K08.89 PAIN, DENTAL: Primary | ICD-10-CM

## 2022-10-31 DIAGNOSIS — K04.7 DENTAL INFECTION: ICD-10-CM

## 2022-10-31 PROCEDURE — 99283 EMERGENCY DEPT VISIT LOW MDM: CPT

## 2022-10-31 PROCEDURE — 6370000000 HC RX 637 (ALT 250 FOR IP): Performed by: STUDENT IN AN ORGANIZED HEALTH CARE EDUCATION/TRAINING PROGRAM

## 2022-10-31 RX ORDER — AMOXICILLIN AND CLAVULANATE POTASSIUM 875; 125 MG/1; MG/1
1 TABLET, FILM COATED ORAL ONCE
Status: COMPLETED | OUTPATIENT
Start: 2022-10-31 | End: 2022-10-31

## 2022-10-31 RX ORDER — ACETAMINOPHEN 325 MG/1
650 TABLET ORAL EVERY 6 HOURS PRN
Qty: 30 TABLET | Refills: 0 | Status: SHIPPED | OUTPATIENT
Start: 2022-10-31 | End: 2022-11-21

## 2022-10-31 RX ORDER — AMOXICILLIN AND CLAVULANATE POTASSIUM 875; 125 MG/1; MG/1
1 TABLET, FILM COATED ORAL 2 TIMES DAILY
Qty: 20 TABLET | Refills: 0 | Status: SHIPPED | OUTPATIENT
Start: 2022-10-31 | End: 2022-11-10

## 2022-10-31 RX ORDER — BUPIVACAINE HYDROCHLORIDE 5 MG/ML
30 INJECTION, SOLUTION EPIDURAL; INTRACAUDAL ONCE
Status: DISCONTINUED | OUTPATIENT
Start: 2022-10-31 | End: 2022-10-31 | Stop reason: HOSPADM

## 2022-10-31 RX ADMIN — AMOXICILLIN AND CLAVULANATE POTASSIUM 1 TABLET: 875; 125 TABLET, FILM COATED ORAL at 09:43

## 2022-10-31 ASSESSMENT — PAIN DESCRIPTION - LOCATION: LOCATION: TEETH

## 2022-10-31 ASSESSMENT — PAIN SCALES - GENERAL: PAINLEVEL_OUTOF10: 10

## 2022-10-31 ASSESSMENT — PAIN - FUNCTIONAL ASSESSMENT
PAIN_FUNCTIONAL_ASSESSMENT: NONE - DENIES PAIN
PAIN_FUNCTIONAL_ASSESSMENT: 0-10

## 2022-10-31 ASSESSMENT — PAIN DESCRIPTION - PAIN TYPE: TYPE: ACUTE PAIN

## 2022-10-31 ASSESSMENT — PAIN DESCRIPTION - FREQUENCY: FREQUENCY: CONTINUOUS

## 2022-10-31 ASSESSMENT — PAIN DESCRIPTION - DESCRIPTORS: DESCRIPTORS: ACHING

## 2022-10-31 NOTE — DISCHARGE INSTRUCTIONS
You were seen in the emergency department for dental pain. We are prescribing you an antibiotic because I feel that you are developing an infection. Please take the medication as prescribed. You can return to the emergency department at anytime with any new or concerning changes to your health.   We hope you feel better soon

## 2022-10-31 NOTE — ED NOTES
Patient to ed with complaints of dental pain after losing a cap from a tooth.      Abelino Logan RN  10/31/22 0413

## 2022-10-31 NOTE — ED NOTES
Patient given prescription,  discharge instructions verbal and written, patient verbalized understanding. Alert/oriented X4, Clear speech.   Patient exhibits no distress, ambulates with steady gait per self leaving unit, no further request.      Abelino Logan RN  10/31/22 1002

## 2022-10-31 NOTE — ED PROVIDER NOTES
ATTENDING PHYSICIAN NOTE       Date of evaluation: 10/31/2022    Chief Complaint     Dental Pain      History of Present Illness     Josefina Conley is a 57 y.o. male who presents with dental pain. Patient reports that his right lower molar lower is loose and painful. Denies any trauma. He has tried to get in with his dentist as well as a 24-hour clinic however was unable to do so due to work hours. Currently rates 10 9 out of 10. Pain described as achy, localized, constant and worsens with any eating or drinking. Denies any fever, body aches or chills. Denies any nausea or vomiting. Denies any recent cough, congestion, sore throat, difficulty swallowing, chest pain, palpitations, abdominal pain, or changes in bowel and bladder. Review of Systems     Review of Systems   All other systems reviewed and are negative. Past Medical, Surgical, Family, and Social History     He has a past medical history of Arthritis, BPH (benign prostatic hyperplasia), BPH (benign prostatic hypertrophy), Clostridium difficile infection, Diabetes mellitus ([facility] [address] Elevated LDL cholesterol level, Essential hypertension, benign, S/P colonoscopy, and Wears glasses. He has a past surgical history that includes laparoscopic appendectomy; Hand surgery (Left, 11/10/2017); Colonoscopy; TURP (N/A, 12/6/2019); Upper gastrointestinal endoscopy (N/A, 10/9/2020); ERCP (10/9/2020); ERCP (10/9/2020); and Cholecystectomy, laparoscopic (N/A, 10/13/2020). His family history includes Diabetes in his father and mother; High Blood Pressure in his mother. He reports that he quit smoking about 3 years ago. His smoking use included cigarettes. He has a 2.50 pack-year smoking history. He has never used smokeless tobacco. He reports current alcohol use. He reports that he does not use drugs.     Medications     Discharge Medication List as of 10/31/2022  9:58 AM        CONTINUE these medications which have NOT CHANGED    Details   metoclopramide (REGLAN) 10 MG tablet Take 1 tablet by mouth 4 times daily (before meals and nightly), Disp-120 tablet,R-0Print      docusate sodium (COLACE, DULCOLAX) 100 MG CAPS Take 100 mg by mouth 2 times daily, Disp-60 capsule,R-0Print      tamsulosin (FLOMAX) 0.4 MG capsule Take 1 capsule by mouth daily for 10 days, Disp-30 capsule,R-0Print      metFORMIN (GLUCOPHAGE) 1000 MG tablet TAKE 1 TABLET BY MOUTH TWICE DAILY WITH MEALS FOR DIABETES, Disp-60 tablet,R-2Normal      Multiple Vitamins-Minerals (THERAPEUTIC MULTIVITAMIN-MINERALS) tablet Take 1 tablet by mouth dailyHistorical Med             Allergies     He is allergic to shellfish-derived products and ibuprofen. Physical Exam     INITIAL VITALS: BP: 127/81, Temp: 97.8 °F (36.6 °C), Heart Rate: 81, Resp: 14, SpO2: 99 %   Physical Exam  Vitals and nursing note reviewed. Constitutional:       General: He is not in acute distress. Appearance: Normal appearance. HENT:      Head: Normocephalic and atraumatic. Right Ear: External ear normal.      Left Ear: External ear normal.      Nose: Nose normal.      Mouth/Throat:      Pharynx: Oropharynx is clear. No oropharyngeal exudate or posterior oropharyngeal erythema. Comments: Bottom right back molar with mild erythema. No evidence of fluctuance or fluid pocket. Molar is tender to palpation. There is no oropharyngeal erythema or tonsillar swelling. Uvula is midline. No erythematous changes to mandible. Eyes:      Extraocular Movements: Extraocular movements intact. Conjunctiva/sclera: Conjunctivae normal.   Cardiovascular:      Rate and Rhythm: Normal rate and regular rhythm. Pulses: Normal pulses. Heart sounds: Normal heart sounds. Pulmonary:      Effort: Pulmonary effort is normal.      Breath sounds: Normal breath sounds. No wheezing or rhonchi. Abdominal:      General: There is no distension. Palpations: Abdomen is soft. Tenderness: There is no abdominal tenderness. Musculoskeletal:         General: Normal range of motion. Cervical back: Normal range of motion and neck supple. No rigidity. Skin:     General: Skin is warm. Capillary Refill: Capillary refill takes less than 2 seconds. Neurological:      General: No focal deficit present. Mental Status: He is alert. Cranial Nerves: No cranial nerve deficit. Psychiatric:         Mood and Affect: Mood normal.         Behavior: Behavior normal.       Diagnostic Results       RADIOLOGY:  No orders to display       LABS:   No results found for this visit on 10/31/22. ED BEDSIDE ULTRASOUND:  No results found. RECENT VITALS:  BP: 127/81,Temp: 97.8 °F (36.6 °C), Heart Rate: 81, Resp: 14, SpO2: 99 %     Procedures     PROCEDURE:  DENTAL BLOCK  Jessy Wise or their surrogate had an opportunity to ask questions, and the risks, benefits, and alternatives were discussed. The injection site was prepped to maintain a sterile field. A local anesthetic was used to completely anesthetize the inferior alveolar nerve. There were no complications during the procedure. Jessy Wise had good pain relief. ED Course     Nursing Notes, Past Medical Hx, Past Surgical Hx, Social Hx,Allergies, and Family Hx were reviewed.          patient was given the following medications:  Orders Placed This Encounter   Medications    DISCONTD: bupivacaine (PF) (MARCAINE) 0.5 % injection 150 mg    amoxicillin-clavulanate (AUGMENTIN) 875-125 MG per tablet 1 tablet     Order Specific Question:   Antimicrobial Indications     Answer:   Head and Neck Infection    amoxicillin-clavulanate (AUGMENTIN) 875-125 MG per tablet     Sig: Take 1 tablet by mouth 2 times daily for 10 days     Dispense:  20 tablet     Refill:  0    acetaminophen (AMINOFEN) 325 MG tablet     Sig: Take 2 tablets by mouth every 6 hours as needed for Pain     Dispense:  30 tablet     Refill:  0       CONSULTS:  None    MEDICAL DECISIONMAKING / Sudha Mendoza / Wily Lopez Megan Morrissey is a 57 y.o. male who presents with dental pain. He presented normotensive, afebrile, heart rate of 81, respiratory rate of 14 and satting at 99% on room air. Given history and exam presentation likely secondary to underlying dental infection. Exam does not suggest underlying Chris angina, peritonsillar abscess, pharyngitis, acute otitis media. He has no tenderness or erythema over mastoid. I discussed performing dental block which patient is amenable to. We will start oral antibiotic to help treat infection. All questions and concerns were addressed. Strict return precautions reviewed. Clinical Impression     1. Pain, dental    2. Dental infection        Disposition     PATIENT REFERRED TO:  No follow-up provider specified.     DISCHARGE MEDICATIONS:  Discharge Medication List as of 10/31/2022  9:58 AM        START taking these medications    Details   amoxicillin-clavulanate (AUGMENTIN) 875-125 MG per tablet Take 1 tablet by mouth 2 times daily for 10 days, Disp-20 tablet, R-0Normal      acetaminophen (AMINOFEN) 325 MG tablet Take 2 tablets by mouth every 6 hours as needed for Pain, Disp-30 tablet, R-0Normal             DISPOSITION Decision To Discharge 10/31/2022 09:48:36 AM  '        Lacho Tafoya MD  10/31/22 1941

## 2022-11-21 ENCOUNTER — OFFICE VISIT (OUTPATIENT)
Dept: PRIMARY CARE CLINIC | Age: 62
End: 2022-11-21
Payer: COMMERCIAL

## 2022-11-21 VITALS
DIASTOLIC BLOOD PRESSURE: 76 MMHG | HEART RATE: 107 BPM | BODY MASS INDEX: 22.79 KG/M2 | OXYGEN SATURATION: 100 % | SYSTOLIC BLOOD PRESSURE: 117 MMHG | WEIGHT: 162.8 LBS | HEIGHT: 71 IN

## 2022-11-21 DIAGNOSIS — E11.9 TYPE 2 DIABETES MELLITUS WITHOUT COMPLICATION, WITHOUT LONG-TERM CURRENT USE OF INSULIN (HCC): ICD-10-CM

## 2022-11-21 DIAGNOSIS — Z88.6 ALLERGY TO NSAIDS: ICD-10-CM

## 2022-11-21 DIAGNOSIS — M70.61 TROCHANTERIC BURSITIS, RIGHT HIP: Primary | ICD-10-CM

## 2022-11-21 PROBLEM — Z98.890 HISTORY OF TRANSURETHRAL RESECTION OF PROSTATE: Status: ACTIVE | Noted: 2019-12-06

## 2022-11-21 PROBLEM — Z90.79 HISTORY OF TRANSURETHRAL RESECTION OF PROSTATE: Status: ACTIVE | Noted: 2019-12-06

## 2022-11-21 LAB — HBA1C MFR BLD: 10.5 %

## 2022-11-21 PROCEDURE — 3046F HEMOGLOBIN A1C LEVEL >9.0%: CPT | Performed by: FAMILY MEDICINE

## 2022-11-21 PROCEDURE — 99204 OFFICE O/P NEW MOD 45 MIN: CPT | Performed by: FAMILY MEDICINE

## 2022-11-21 PROCEDURE — 83036 HEMOGLOBIN GLYCOSYLATED A1C: CPT | Performed by: FAMILY MEDICINE

## 2022-11-21 RX ORDER — GLIPIZIDE 5 MG/1
5 TABLET ORAL 2 TIMES DAILY
Qty: 60 TABLET | Refills: 5 | Status: SHIPPED | OUTPATIENT
Start: 2022-11-21

## 2022-11-21 RX ORDER — TRAMADOL HYDROCHLORIDE 50 MG/1
50 TABLET ORAL EVERY 6 HOURS PRN
Qty: 20 TABLET | Refills: 0 | Status: SHIPPED | OUTPATIENT
Start: 2022-11-21 | End: 2022-11-26

## 2022-11-21 ASSESSMENT — ENCOUNTER SYMPTOMS
ABDOMINAL PAIN: 0
SHORTNESS OF BREATH: 0
COUGH: 0
NAUSEA: 0
SORE THROAT: 0

## 2022-11-21 NOTE — PROGRESS NOTES
[address] [facility] [address] PRIMARY CARE  [address] [place] [address]  [address] [facility] [address]  Dept: [phone]  Dept Fax: [phone]     11/21/2022      [facility]   1960     Chief Complaint   Patient presents with    Leg Pain     Patient c/o right leg pain when laying down. New Patient       HPI  Pt comes in today as a patient to establish care. He has not seen a PCP in quite some time. Acutely he has concerns regarding his right leg and the pain associated with this. More long-term patient has not had any evaluation for his blood pressure or his blood sugar. He has not actively been treating diabetes for quite some time. R LEG: Had a distant injury years ago. About 1 month ago started having pain at night with sleeping. Has used some OTC topicals. Some pain during day, but it is not specific to anything in particular. There is no obvious low back pain with radiation to this area. The pain is located to the lateral upper thigh. No rash to skin noted. HgBA1c:    Lab Results   Component Value Date/Time    LABA1C 10.5 11/21/2022 01:22 PM     PHQ Scores 2/26/2020 10/21/2019 9/5/2019 4/11/2018 1/20/2015   PHQ2 Score 0 0 0 0 0   PHQ9 Score 0 0 0 0 0     Interpretation of Total Score Depression Severity: 1-4 = Minimal depression, 5-9 = Mild depression, 10-14 = Moderate depression, 15-19 = Moderately severe depression, 20-27 = Severe depression     Prior to Visit Medications    Not on File       Past Medical History:   Diagnosis Date    Diabetes mellitus ([facility] [address] 2012    History of Clostridium difficile infection         Social History     Tobacco Use    Smoking status: Former     Packs/day: 0.25     Years: 10.00     Pack years: 2.50     Types: Cigarettes     Quit date: 9/2/2019     Years since quitting: 3.2    Smokeless tobacco: Never   Vaping Use    Vaping Use: Never used   Substance Use Topics    Alcohol use:  Yes     Alcohol/week: 0.0 standard drinks     Comment: occasionally    Drug use: No     Types: Marijuana [employer] [facility])     Comment: 5-6 years ago         Past Surgical History:   Procedure Laterality Date    CHOLECYSTECTOMY, LAPAROSCOPIC N/A 10/13/2020    ROBOTIC ASSISTED LAPAROSCOPIC CHOLECYSTECTOMY WITH CHOLECYSTODUODENAL FISTULA TAKEDOWN, DRAINAGE OF LIVER ABCESS, ESOPHAGOGASTRODUODENOSCOPY performed by Uri Newman MD at [address]      ERCP  10/9/2020    ERCP STONE REMOVAL performed by Kimberly Gonzalez MD at [place] [address]    ERCP  10/9/2020    ERCP SPHINCTER/PAPILLOTOMY performed by Kimberyl Gonzalez MD at [address] 11/10/2017    OPERATIVE FIXATION OF LEFT RING FINGER PROXIMAL PHALANX AND LEFT SMALL FINGER MIDDLE PHALANX FRACTURES. INCLUDING OPEN REDUCTION INTERNAL FIXATION    LAPAROSCOPIC APPENDECTOMY      TURP N/A 12/6/2019    CYSTOSCOPY TRANSURETHRAL RESECTION PROSTATE performed by Ashely Temple DO at [address] [place] [address] N/A 10/9/2020    EGD DIAGNOSTIC ONLY performed by Kimberly Gonzalez MD at [facility] ENDOSCOPY        Allergies   Allergen Reactions    Shellfish-Derived Products Anaphylaxis     Throat swelling    Ibuprofen Swelling        Family History   Problem Relation Age of Onset    Diabetes Mother     High Blood Pressure Mother     Diabetes Father     Cancer Neg Hx     Asthma Neg Hx     Heart Failure Neg Hx     High Cholesterol Neg Hx     Hypertension Neg Hx     Migraines Neg Hx     Rashes/Skin Problems Neg Hx     Seizures Neg Hx     Stroke Neg Hx     Thyroid Disease Neg Hx         Patient's past medical history, surgical history, family history, medications, and allergies  were all reviewed and updated as appropriate today. Review of Systems   Constitutional:  Negative for fatigue, fever and unexpected weight change. HENT:  Negative for congestion, ear pain and sore throat. Eyes:  Negative for pain, itching and visual disturbance. Respiratory:  Negative for cough, shortness of breath and wheezing. Cardiovascular:  Negative for chest pain, palpitations and leg swelling. Gastrointestinal:  Negative for abdominal pain, constipation, diarrhea, nausea and vomiting. Endocrine: Negative for cold intolerance, heat intolerance, polydipsia and polyuria. Genitourinary:  Negative for dysuria, frequency and hematuria. Musculoskeletal:  Negative for arthralgias and joint swelling.        +R lateral thigh pain   Skin:  Negative for rash. Neurological:  Negative for dizziness and headaches. Hematological:  Negative for adenopathy. /76   Pulse (!) 107   Ht 5' 11\" (1.803 m)   Wt 162 lb 12.8 oz (73.8 kg)   SpO2 100%   BMI 22.71 kg/m²      Physical Exam  Vitals reviewed. Constitutional:       General: He is not in acute distress. Appearance: Normal appearance. He is well-developed and normal weight. HENT:      Head: Normocephalic and atraumatic. Right Ear: Tympanic membrane and ear canal normal. No drainage. No middle ear effusion. Tympanic membrane is not erythematous. Left Ear: Tympanic membrane and ear canal normal. No drainage. No middle ear effusion. Tympanic membrane is not erythematous. Nose: Nose normal. No rhinorrhea. Mouth/Throat:      Mouth: Mucous membranes are moist.      Pharynx: No oropharyngeal exudate or posterior oropharyngeal erythema. Eyes:      Extraocular Movements: Extraocular movements intact. Pupils: Pupils are equal, round, and reactive to light. Neck:      Thyroid: No thyromegaly. Cardiovascular:      Rate and Rhythm: Normal rate and regular rhythm. Heart sounds: No murmur heard. Pulmonary:      Effort: Pulmonary effort is normal.      Breath sounds: Normal breath sounds. No wheezing. Abdominal:      General: Bowel sounds are normal.      Palpations: Abdomen is soft. There is no mass. Tenderness: There is no abdominal tenderness. Musculoskeletal:         General: No swelling or deformity. Normal range of motion. Cervical back: Neck supple. Right upper leg: Tenderness (greater trochanteric area) present. No swelling or deformity. Lymphadenopathy:      Cervical: No cervical adenopathy. Skin:     General: Skin is warm and dry. Findings: No rash. Neurological:      General: No focal deficit present. Mental Status: He is alert and oriented to person, place, and time. Cranial Nerves: No cranial nerve deficit. Psychiatric:         Mood and Affect: Mood normal.         Behavior: Behavior is cooperative. Assessment:  Encounter Diagnoses   Name Primary? Trochanteric bursitis, right hip Yes    Type 2 diabetes mellitus without complication, without long-term current use of insulin ([facility]     Allergy to NSAIDs        Plan:  1. Trochanteric bursitis, right hip  Patient to practice with suspected right trochanteric bursitis. He reports a significant allergy to anti-inflammatories and has uncontrolled diabetes. This makes his treatment complicated as I do not feel confident in giving him anti-inflammatories, nor do I want to give him prednisone orally that could await his blood sugars. At this time I have provided referral to orthopedics, they will likely consider a trochanteric bursa injection. I have also given him a prescription for tramadol to use as needed. - [address]  - traMADol (ULTRAM) 50 MG tablet; Take 1 tablet by mouth every 6 hours as needed for Pain for up to 5 days. Intended supply: 5 days. Take lowest dose possible to manage pain  Dispense: 20 tablet; Refill: 0    2. Type 2 diabetes mellitus without complication, without long-term current use of insulin ([facility])  Chronic, untreated and unmonitored for quite some time. His sugar is severely uncontrolled with an A1c greater than 10. Education provided for diet/lifestyle changes and I have provided him to oral medications that I would like him to get started on now.   We will follow-up

## 2022-11-22 ENCOUNTER — OFFICE VISIT (OUTPATIENT)
Dept: ORTHOPEDIC SURGERY | Age: 62
End: 2022-11-22
Payer: COMMERCIAL

## 2022-11-22 VITALS — HEIGHT: 71 IN | BODY MASS INDEX: 22.68 KG/M2 | WEIGHT: 162 LBS

## 2022-11-22 DIAGNOSIS — M25.551 RIGHT HIP PAIN: Primary | ICD-10-CM

## 2022-11-22 DIAGNOSIS — M70.61 GREATER TROCHANTERIC BURSITIS OF RIGHT HIP: ICD-10-CM

## 2022-11-22 PROBLEM — Z86.19 HISTORY OF CLOSTRIDIUM DIFFICILE INFECTION: Status: ACTIVE | Noted: 2022-11-22

## 2022-11-22 PROCEDURE — 99204 OFFICE O/P NEW MOD 45 MIN: CPT | Performed by: ORTHOPAEDIC SURGERY

## 2022-11-22 PROCEDURE — 20610 DRAIN/INJ JOINT/BURSA W/O US: CPT | Performed by: ORTHOPAEDIC SURGERY

## 2022-11-22 RX ORDER — METHYLPREDNISOLONE ACETATE 40 MG/ML
40 INJECTION, SUSPENSION INTRA-ARTICULAR; INTRALESIONAL; INTRAMUSCULAR; SOFT TISSUE ONCE
Status: COMPLETED | OUTPATIENT
Start: 2022-11-22 | End: 2022-11-22

## 2022-11-22 RX ORDER — BUPIVACAINE HYDROCHLORIDE 5 MG/ML
4 INJECTION, SOLUTION PERINEURAL ONCE
Status: COMPLETED | OUTPATIENT
Start: 2022-11-22 | End: 2022-11-22

## 2022-11-22 RX ADMIN — METHYLPREDNISOLONE ACETATE 40 MG: 40 INJECTION, SUSPENSION INTRA-ARTICULAR; INTRALESIONAL; INTRAMUSCULAR; SOFT TISSUE at 10:00

## 2022-11-22 RX ADMIN — BUPIVACAINE HYDROCHLORIDE 20 MG: 5 INJECTION, SOLUTION PERINEURAL at 09:58

## 2022-11-22 ASSESSMENT — ENCOUNTER SYMPTOMS
EYE PAIN: 0
CONSTIPATION: 0
WHEEZING: 0
VOMITING: 0
DIARRHEA: 0
EYE ITCHING: 0

## 2022-12-05 NOTE — PROGRESS NOTES
11/22/2022     Reason for visit:  Right hip pain    History of Present Illness: The patient is a 77-year-old male who presents for evaluation of his right hip. He reports on and off pain for several years with recent worsening. The pain is localized to the lateral aspect of the hip. Is made worse with standing, walking, and lying on his side. No groin pain. No radiation pain down the leg. No numbness or tingling. Medical History:  Past Medical History:   Diagnosis Date    Diabetes mellitus ([facility] [address] 2012    History of Clostridium difficile infection       Past Surgical History:   Procedure Laterality Date    CHOLECYSTECTOMY, LAPAROSCOPIC N/A 10/13/2020    ROBOTIC ASSISTED LAPAROSCOPIC CHOLECYSTECTOMY WITH CHOLECYSTODUODENAL FISTULA TAKEDOWN, DRAINAGE OF LIVER ABCESS, ESOPHAGOGASTRODUODENOSCOPY [facility] [address] [facility]      ERCP  10/9/2020    ERCP STONE REMOVAL performed by Wauneta Lundborg, MD at [place] [address]    ERCP  10/9/2020    ERCP SPHINCTER/PAPILLOTOMY performed by Wauneta Lundborg, MD at [address] 11/10/2017    OPERATIVE FIXATION OF LEFT RING FINGER PROXIMAL PHALANX AND LEFT SMALL FINGER MIDDLE PHALANX FRACTURES.  INCLUDING OPEN REDUCTION INTERNAL FIXATION    LAPAROSCOPIC APPENDECTOMY      TURP N/A 12/6/2019    CYSTOSCOPY TRANSURETHRAL RESECTION PROSTATE performed by Nicole Nur DO at [address] N/A 10/9/2020    EGD DIAGNOSTIC ONLY performed by Wauneta Lundborg, MD at [address] ENDOSCOPY      Family History   Problem Relation Age of Onset    Diabetes Mother     High Blood Pressure Mother     Diabetes Father       Social History     Socioeconomic History    Marital status: Single     Spouse name: Not on file    Number of children: 3    Years of education: Not on file    Highest education level: Not on file   Occupational History    Occupation: Security   Tobacco Use    Smoking status: Former     Packs/day: 0.25 BMI 22.59 kg/m²      Physical Exam:  The patient is well-appearing and in no apparent distress  Examination of the right hip  No pain with full range of motion  5 out of 5 strength throughout distal muscle groups  Sensation is intact to light touch throughout all distributions  There is no calf swelling or tenderness  Palpable DP pulse, brisk cap refill, 2+ symmetric reflexes     Imaging:  AP pelvis x-ray and 2 view x-rays of the right hip were obtained in the office today on 11/22/2022 and reviewed. There is no fracture dislocation. No other abnormality      Assessment:  Right hip pain. Suspect trochanteric bursitis    Plan:  I discussed with the patient the diagnosis and treatment options. We discussed operative and nonoperative management. At this point I do recommend nonoperative management. Nonoperative treatment options include activity modification, anti-inflammatory medications, physical therapy, and injections. The patient elected proceed with cortisone injection. Therefore injection was given to the right trochanteric bursa via sterile technique. The injection consisted of 40 mg of Depo-Medrol combined with 4 mL of 0.5% Marcaine. The patient tolerated this well. We will also send the patient to physical therapy. The patient return as needed. If he does remain symptomatic he will call us and neck step would be an MRI. Greater than 45 minutes were spent with this encounter. Time spent included evaluating the patient's chart prior to arrival.  Evaluating the patient in the office including history, physical examination, imaging reviewing, and counseling on next steps. Lastly, time was spent discussing orders with my staff as well as providing documentation in the chart.                     uLis Rivas MD            Orthopaedic Surgery Sports Medicine and [facility]            Team Physician [place] ([place]      Disclaimer: This note was dictated with voice recognition software. Though review and correction are routine, we apologize for any errors.

## 2022-12-20 ENCOUNTER — TELEPHONE (OUTPATIENT)
Dept: PRIMARY CARE CLINIC | Age: 62
End: 2022-12-20

## 2022-12-20 NOTE — TELEPHONE ENCOUNTER
----- Message from Angelo Jones sent at 12/19/2022 11:45 AM EST -----  Subject: Message to Provider    QUESTIONS  Information for Provider? pt. Kiana Christine would like to stay w/in the   practice but would like to see a different provider besides Kevon Grullon DO, pt. also would like to discuss a medication   metFORMIN (GLUCOPHAGE) 1000 MG tablet  ---------------------------------------------------------------------------  --------------  Dwight Miner [facility]  [phone]; OK to leave message on voicemail  ---------------------------------------------------------------------------  --------------  SCRIPT ANSWERS  Relationship to Patient?  Self

## 2022-12-20 NOTE — TELEPHONE ENCOUNTER
We do not transfer patients between providers within the practice. If he is absolutely wanting to see a different provider, unfortunately he would have to relocate to a different [facility].

## 2023-01-18 ENCOUNTER — OFFICE VISIT (OUTPATIENT)
Dept: FAMILY MEDICINE CLINIC | Age: 63
End: 2023-01-18
Payer: COMMERCIAL

## 2023-01-18 VITALS
BODY MASS INDEX: 22.79 KG/M2 | RESPIRATION RATE: 16 BRPM | HEART RATE: 90 BPM | SYSTOLIC BLOOD PRESSURE: 116 MMHG | DIASTOLIC BLOOD PRESSURE: 74 MMHG | WEIGHT: 162.8 LBS | HEIGHT: 71 IN | OXYGEN SATURATION: 97 % | TEMPERATURE: 98.8 F

## 2023-01-18 DIAGNOSIS — E11.9 TYPE 2 DIABETES MELLITUS WITHOUT COMPLICATION, WITHOUT LONG-TERM CURRENT USE OF INSULIN (HCC): ICD-10-CM

## 2023-01-18 DIAGNOSIS — Z13.220 SCREENING FOR HYPERLIPIDEMIA: ICD-10-CM

## 2023-01-18 DIAGNOSIS — R63.4 WEIGHT LOSS: ICD-10-CM

## 2023-01-18 DIAGNOSIS — E11.9 TYPE 2 DIABETES MELLITUS WITHOUT COMPLICATION, WITHOUT LONG-TERM CURRENT USE OF INSULIN (HCC): Primary | ICD-10-CM

## 2023-01-18 DIAGNOSIS — M79.605 LEG PAIN, BILATERAL: ICD-10-CM

## 2023-01-18 DIAGNOSIS — M79.604 LEG PAIN, BILATERAL: ICD-10-CM

## 2023-01-18 PROCEDURE — 36415 COLL VENOUS BLD VENIPUNCTURE: CPT | Performed by: NURSE PRACTITIONER

## 2023-01-18 PROCEDURE — 99204 OFFICE O/P NEW MOD 45 MIN: CPT | Performed by: NURSE PRACTITIONER

## 2023-01-18 RX ORDER — GABAPENTIN 100 MG/1
100 CAPSULE ORAL 2 TIMES DAILY
Qty: 60 CAPSULE | Refills: 0 | Status: SHIPPED | OUTPATIENT
Start: 2023-01-18 | End: 2023-02-17

## 2023-01-18 SDOH — ECONOMIC STABILITY: FOOD INSECURITY: WITHIN THE PAST 12 MONTHS, YOU WORRIED THAT YOUR FOOD WOULD RUN OUT BEFORE YOU GOT MONEY TO BUY MORE.: NEVER TRUE

## 2023-01-18 SDOH — ECONOMIC STABILITY: FOOD INSECURITY: WITHIN THE PAST 12 MONTHS, THE FOOD YOU BOUGHT JUST DIDN'T LAST AND YOU DIDN'T HAVE MONEY TO GET MORE.: NEVER TRUE

## 2023-01-18 ASSESSMENT — PATIENT HEALTH QUESTIONNAIRE - PHQ9
1. LITTLE INTEREST OR PLEASURE IN DOING THINGS: 0
SUM OF ALL RESPONSES TO PHQ QUESTIONS 1-9: 0
2. FEELING DOWN, DEPRESSED OR HOPELESS: 0
SUM OF ALL RESPONSES TO PHQ QUESTIONS 1-9: 0
SUM OF ALL RESPONSES TO PHQ QUESTIONS 1-9: 0
SUM OF ALL RESPONSES TO PHQ9 QUESTIONS 1 & 2: 0
SUM OF ALL RESPONSES TO PHQ QUESTIONS 1-9: 0

## 2023-01-18 ASSESSMENT — ENCOUNTER SYMPTOMS
SHORTNESS OF BREATH: 0
COUGH: 0
CHEST TIGHTNESS: 0
COLOR CHANGE: 0
HEMATOCHEZIA: 0
EYE REDNESS: 0
BLOOD IN STOOL: 0
EYE ITCHING: 0
TROUBLE SWALLOWING: 0
RECTAL PAIN: 0
FLATUS: 0
BACK PAIN: 0
ABDOMINAL DISTENTION: 0
CONSTIPATION: 1
DIARRHEA: 0
APNEA: 0
VOMITING: 0
ABDOMINAL PAIN: 0
RHINORRHEA: 0
WHEEZING: 0
NAUSEA: 0

## 2023-01-18 ASSESSMENT — SOCIAL DETERMINANTS OF HEALTH (SDOH): HOW HARD IS IT FOR YOU TO PAY FOR THE VERY BASICS LIKE FOOD, HOUSING, MEDICAL CARE, AND HEATING?: NOT HARD AT ALL

## 2023-01-18 NOTE — PROGRESS NOTES
Mariia Vasquez (:  1960) is a 57 y.o. male,New patient, here for evaluation of the following chief complaint(s):  New Patient, Diabetes, Bursitis (Possible bursitis R leg), Other (Feels like his body is on fire: lower extremity and B/L feet ), and Constipation (X 1-2 months )      ASSESSMENT/PLAN:  1. Type 2 diabetes mellitus without complication, without long-term current use of insulin (Formerly [facility])  -     Comprehensive Metabolic Panel; Future  -     Hemoglobin A1C  -     CBC with Auto Differential; Future  -     Microalbumin / Creatinine Urine Ratio  -      DIABETES FOOT EXAM  -     gabapentin (NEURONTIN) 100 MG capsule; Take 1 capsule by mouth 2 times daily for 30 days. Intended supply: 30 days, Disp-60 capsule, R-0Normal  -     dapagliflozin (FARXIGA) 5 MG tablet; Take 1 tablet by mouth every morning, Disp-90 tablet, R-1Normal  - Stop metformin d/t side effect, will try farxiga and continue glipizide  - Will schedule with diabetic education for help with diet  2. Screening for hyperlipidemia  -     Lipid Panel  3. Weight loss  -     TSH; Future  -     FREE T4; Future  4. Leg pain, bilateral  -     gabapentin (NEURONTIN) 100 MG capsule; Take 1 capsule by mouth 2 times daily for 30 days. Intended supply: 30 days, Disp-60 capsule, R-0Normal  -  Microfilament testing was normal today  - discussed concern for neuropathic pain syndrome d/t uncontrolled diabetes  - will check a1c today  5. Constipation         - Continue miralax and ducolax as needed         - discussed relation of diabetes/hyperglycemia and gut health         - increase dietary fiber             Return in about 3 months (around 2023). SUBJECTIVE/OBJECTIVE:  Pt here to establish care. Concerns today include leg pain, constipation and diabetes,    Leg Pain:  Started several months ago, seems to be worsening. Was treated for right hip bursitis with steroid injection at orthopedic office, which worsened symptoms. Unable to take NSAIDS. Describes pain as burning, worse at night. Constipation: intermittent, seems to have started within the last year. Pt taking ducolax tablets as needed, uses maybe once per week and miralax once per week. Has bm 1-2 days after taking these. But concerned about change in bm. Pt reports recent colonoscopy was normal.     DM: Was previously taking 1000mg of metformin bid, has been out of for 2+ years, recently restarted on metformin and glipizide added. Pt reports Metformin he has not been taking regularly d/t muscle cramping/leg pain side effects. Pt admits to weight loss of 20lbs over the last 2-3 months. No recent diet changes. Pt does avoid salt in diet, and tries to avoid sweets but no regular diet planning, has never seen diabetic educator. Does not check sugars at home d/t needle sticks. Diabetes  He presents for his initial diabetic visit. He has type 2 diabetes mellitus. The initial diagnosis of diabetes was made 10 years ago. His disease course has been worsening. There are no hypoglycemic associated symptoms. Pertinent negatives for hypoglycemia include no confusion, dizziness, headaches, nervousness/anxiousness or pallor. Associated symptoms include polydipsia and weight loss. Pertinent negatives for diabetes include no chest pain, no fatigue, no foot ulcerations, no polyphagia, no polyuria and no weakness. There are no hypoglycemic complications. Symptoms are worsening. There are no diabetic complications. Risk factors for coronary artery disease include diabetes mellitus and male sex. Current diabetic treatment includes oral agent (dual therapy). He is compliant with treatment some of the time. His weight is decreasing rapidly. He is following a generally healthy and low salt diet. When asked about meal planning, he reported none. He has not had a previous visit with a dietitian. He participates in exercise weekly. An ACE inhibitor/angiotensin II receptor blocker is not being taken.  He does not see a podiatrist.Eye exam is current. Constipation  This is a recurrent problem. The current episode started more than 1 month ago. The problem is unchanged. His stool frequency is 2 to 3 times per week. The stool is described as firm. The patient is not on a high fiber diet. He Exercises regularly. There has Been adequate water intake. Associated symptoms include weight loss. Pertinent negatives include no abdominal pain, back pain, diarrhea, difficulty urinating, fecal incontinence, fever, flatus, hematochezia, hemorrhoids, melena, nausea, rectal pain or vomiting. He has tried laxatives and fiber for the symptoms. The treatment provided significant relief. Current Outpatient Medications   Medication Sig Dispense Refill    gabapentin (NEURONTIN) 100 MG capsule Take 1 capsule by mouth 2 times daily for 30 days. Intended supply: 30 days 60 capsule 0    dapagliflozin (FARXIGA) 5 MG tablet Take 1 tablet by mouth every morning 90 tablet 1    glipiZIDE (GLUCOTROL) 5 MG tablet Take 1 tablet by mouth 2 times daily 60 tablet 5     No current facility-administered medications for this visit. Past Medical History:  2012: Diabetes mellitus ([address]  No date: History of Clostridium difficile infection  Past Surgical History:   Procedure Laterality Date    CHOLECYSTECTOMY, LAPAROSCOPIC N/A 10/13/2020    ROBOTIC ASSISTED LAPAROSCOPIC CHOLECYSTECTOMY WITH CHOLECYSTODUODENAL FISTULA TAKEDOWN, DRAINAGE OF LIVER ABCESS, ESOPHAGOGASTRODUODENOSCOPY performed by Nazanin Park MD at [address] [place] [address]       ERCP  10/9/2020    ERCP STONE REMOVAL performed by Sisi Hampton MD at [address]  10/9/2020    ERCP SPHINCTER/PAPILLOTOMY performed by Sisi Hampton MD at [address] 11/10/2017    OPERATIVE FIXATION OF LEFT RING FINGER PROXIMAL PHALANX AND LEFT SMALL FINGER MIDDLE PHALANX FRACTURES.  INCLUDING OPEN REDUCTION INTERNAL FIXATION    LAPAROSCOPIC APPENDECTOMY      TURP N/A 12/6/2019 CYSTOSCOPY TRANSURETHRAL RESECTION PROSTATE performed by Bryce Carlos DO at [address] N/A 10/9/2020    EGD DIAGNOSTIC ONLY performed by Jefferson Villalobos MD at [address] History     Socioeconomic History    Marital status: Single     Spouse name: Not on file    Number of children: 3    Years of education: Not on file    Highest education level: Not on file   Occupational History    Occupation: Security   Tobacco Use    Smoking status: Former     Packs/day: 0.25     Years: 10.00     Pack years: 2.50     Types: Cigarettes     Quit date: 9/2/2019     Years since quitting: 3.3    Smokeless tobacco: Never   Vaping Use    Vaping Use: Never used   Substance and Sexual Activity    Alcohol use: Yes     Alcohol/week: 0.0 standard drinks     Comment: occasionally    Drug use: Not Currently     Types: Marijuana Zollie Axe)    Sexual activity: Yes   Other Topics Concern    Not on file   Social History Narrative    Not on file     Social Determinants of Health     Financial Resource Strain: Low Risk     Difficulty of Paying Living Expenses: Not hard at all   Food Insecurity: No Food Insecurity    Worried About Running Out of Food in the Last Year: Never true    Ran Out of Food in the Last Year: Never true   Transportation Needs: Not on file   Physical Activity: Not on file   Stress: Not on file   Social Connections: Not on file   Intimate Partner Violence: Not on file   Housing Stability: Not on file         Review of Systems   Constitutional:  Positive for unexpected weight change and weight loss. Negative for activity change, appetite change, chills, diaphoresis, fatigue and fever. HENT:  Negative for congestion, dental problem, ear pain, hearing loss, postnasal drip, rhinorrhea and trouble swallowing. Eyes:  Positive for visual disturbance. Negative for redness and itching. Respiratory:  Negative for apnea, cough, chest tightness, shortness of breath and wheezing. Cardiovascular:  Negative for chest pain, palpitations and leg swelling. Gastrointestinal:  Positive for constipation. Negative for abdominal distention, abdominal pain, blood in stool, diarrhea, flatus, hematochezia, hemorrhoids, melena, nausea, rectal pain and vomiting. Endocrine: Positive for polydipsia. Negative for cold intolerance, heat intolerance, polyphagia and polyuria. Genitourinary:  Negative for decreased urine volume, difficulty urinating, dysuria, hematuria and urgency. Musculoskeletal:  Positive for arthralgias. Negative for back pain, gait problem, joint swelling, myalgias and neck pain. Skin:  Negative for color change, pallor and rash. Allergic/Immunologic: Negative for environmental allergies, food allergies and immunocompromised state. Neurological:  Negative for dizziness, weakness, light-headedness, numbness and headaches. Hematological:  Negative for adenopathy. Psychiatric/Behavioral:  Negative for agitation, confusion and sleep disturbance. The patient is not nervous/anxious. Vitals:    01/18/23 1309   BP: 116/74   Pulse: 90   Resp: 16   Temp: 98.8 °F (37.1 °C)   TempSrc: Temporal   SpO2: 97%   Weight: 162 lb 12.8 oz (73.8 kg)   Height: 5' 11\" (1.803 m)      Recent Results (from the past 2016 hour(s))   POCT glycosylated hemoglobin (Hb A1C)    Collection Time: 11/21/22  1:22 PM   Result Value Ref Range    Hemoglobin A1C 10.5 %        Wt Readings from Last 3 Encounters:   01/18/23 162 lb 12.8 oz (73.8 kg)   11/22/22 162 lb (73.5 kg)   11/21/22 162 lb 12.8 oz (73.8 kg)        Physical Exam  Vitals and nursing note reviewed. Constitutional:       General: He is not in acute distress. Appearance: Normal appearance. He is not ill-appearing. HENT:      Head: Normocephalic and atraumatic. Nose: Nose normal.   Eyes:      Extraocular Movements: Extraocular movements intact.       Conjunctiva/sclera: Conjunctivae normal.      Pupils: Pupils are equal, round, and reactive to light. Neck:      Vascular: No carotid bruit. Cardiovascular:      Rate and Rhythm: Normal rate and regular rhythm. Pulses: Normal pulses. Dorsalis pedis pulses are 2+ on the right side and 2+ on the left side. Posterior tibial pulses are 2+ on the right side and 2+ on the left side. Heart sounds: Normal heart sounds. Pulmonary:      Effort: Pulmonary effort is normal.      Breath sounds: Normal breath sounds. Musculoskeletal:         General: No swelling. Normal range of motion. Cervical back: Normal range of motion and neck supple. Right lower leg: No edema. Left lower leg: No edema. Right foot: Normal range of motion. No deformity. Left foot: Normal range of motion. No deformity. Feet:      Right foot:      Protective Sensation: 10 sites tested. 10 sites sensed. Skin integrity: Skin integrity normal. No ulcer, blister or skin breakdown. Toenail Condition: Right toenails are normal.      Left foot:      Protective Sensation: 10 sites tested. 10 sites sensed. Skin integrity: Skin integrity normal. No ulcer, blister or skin breakdown. Toenail Condition: Left toenails are normal.   Skin:     General: Skin is warm and dry. Capillary Refill: Capillary refill takes less than 2 seconds. Coloration: Skin is not jaundiced or pale. Neurological:      General: No focal deficit present. Mental Status: He is alert and oriented to person, place, and time. Motor: No weakness. Gait: Gait normal.   Psychiatric:         Mood and Affect: Mood normal.         Behavior: Behavior normal.         Thought Content: Thought content normal.         Judgment: Judgment normal.                 An electronic signature was used to authenticate this note.     --SYDNEE Thakkar - CNP

## 2023-01-19 LAB
A/G RATIO: 2.2 (ref 1.1–2.2)
ALBUMIN SERPL-MCNC: 4.1 G/DL (ref 3.4–5)
ALP BLD-CCNC: 131 U/L (ref 40–129)
ALT SERPL-CCNC: 9 U/L (ref 10–40)
ANION GAP SERPL CALCULATED.3IONS-SCNC: 15 MMOL/L (ref 3–16)
AST SERPL-CCNC: 11 U/L (ref 15–37)
BASOPHILS ABSOLUTE: 0.1 K/UL (ref 0–0.2)
BASOPHILS RELATIVE PERCENT: 0.6 %
BILIRUB SERPL-MCNC: 0.8 MG/DL (ref 0–1)
BUN BLDV-MCNC: 26 MG/DL (ref 7–20)
CALCIUM SERPL-MCNC: 9.8 MG/DL (ref 8.3–10.6)
CHLORIDE BLD-SCNC: 99 MMOL/L (ref 99–110)
CHOLESTEROL, TOTAL: 162 MG/DL (ref 0–199)
CO2: 24 MMOL/L (ref 21–32)
CREAT SERPL-MCNC: 1.2 MG/DL (ref 0.8–1.3)
CREATININE URINE: 212.5 MG/DL (ref 39–259)
EOSINOPHILS ABSOLUTE: 0.2 K/UL (ref 0–0.6)
EOSINOPHILS RELATIVE PERCENT: 2.8 %
ESTIMATED AVERAGE GLUCOSE: 157.1 MG/DL
GFR SERPL CREATININE-BSD FRML MDRD: >60 ML/MIN/{1.73_M2}
GLUCOSE BLD-MCNC: 191 MG/DL (ref 70–99)
HBA1C MFR BLD: 7.1 %
HCT VFR BLD CALC: 39.9 % (ref 40.5–52.5)
HDLC SERPL-MCNC: 42 MG/DL (ref 40–60)
HEMOGLOBIN: 13.4 G/DL (ref 13.5–17.5)
LDL CHOLESTEROL CALCULATED: 98 MG/DL
LYMPHOCYTES ABSOLUTE: 3 K/UL (ref 1–5.1)
LYMPHOCYTES RELATIVE PERCENT: 34.2 %
MCH RBC QN AUTO: 30.3 PG (ref 26–34)
MCHC RBC AUTO-ENTMCNC: 33.6 G/DL (ref 31–36)
MCV RBC AUTO: 90.1 FL (ref 80–100)
MICROALBUMIN UR-MCNC: 3.3 MG/DL
MICROALBUMIN/CREAT UR-RTO: 15.5 MG/G (ref 0–30)
MONOCYTES ABSOLUTE: 0.6 K/UL (ref 0–1.3)
MONOCYTES RELATIVE PERCENT: 6.5 %
NEUTROPHILS ABSOLUTE: 4.9 K/UL (ref 1.7–7.7)
NEUTROPHILS RELATIVE PERCENT: 55.9 %
PDW BLD-RTO: 12.6 % (ref 12.4–15.4)
PLATELET # BLD: 301 K/UL (ref 135–450)
PMV BLD AUTO: 8.4 FL (ref 5–10.5)
POTASSIUM SERPL-SCNC: 4.5 MMOL/L (ref 3.5–5.1)
RBC # BLD: 4.43 M/UL (ref 4.2–5.9)
SODIUM BLD-SCNC: 138 MMOL/L (ref 136–145)
TOTAL PROTEIN: 6 G/DL (ref 6.4–8.2)
TRIGL SERPL-MCNC: 109 MG/DL (ref 0–150)
TSH SERPL DL<=0.05 MIU/L-ACNC: 1.8 UIU/ML (ref 0.27–4.2)
VLDLC SERPL CALC-MCNC: 22 MG/DL
WBC # BLD: 8.7 K/UL (ref 4–11)

## 2023-01-24 ENCOUNTER — TELEPHONE (OUTPATIENT)
Dept: FAMILY MEDICINE CLINIC | Age: 63
End: 2023-01-24

## 2023-01-24 RX ORDER — CLINDAMYCIN HYDROCHLORIDE 300 MG/1
300 CAPSULE ORAL 2 TIMES DAILY
Qty: 14 CAPSULE | Refills: 0 | Status: SHIPPED | OUTPATIENT
Start: 2023-01-24 | End: 2023-01-31

## 2023-01-24 NOTE — TELEPHONE ENCOUNTER
----- Message from Kasandrarolf Nguyen sent at 1/24/2023  3:02 PM EST -----  Subject: Message to Provider    QUESTIONS  Information for Provider? PT called and sent a message earlier for   antibiotics for mouth pain on right side. He would like a medication for   it before it gets infected. Also PCP sent over Cascilla but PT still has not   received it. PT states he will be leaving for work soon in about 10 mins   and just wanted taken care of before he went to work. Out of the 3   prescriptions that PT was sent he has only received 1. Please contact PT   to advise.   ---------------------------------------------------------------------------  --------------  Red Mapache INFO  [phone]; OK to leave message on voicemail,Do not leave any message,   patient will call back for answer,OK to respond with electronic message   via [facility] [facility] portal (only for patients who have registered [facility] [facility] account)  ---------------------------------------------------------------------------  --------------  SCRIPT ANSWERS  Relationship to Patient?  Self

## 2023-01-24 NOTE — TELEPHONE ENCOUNTER
----- Message from Jaren See sent at 1/24/2023 12:11 PM EST -----  Subject: Message to Provider    QUESTIONS  Information for Provider? pt is calling because he was seen last week   1/18/2023 and discussed with  about problems with his mouth she said she   did not think he needed anything but he is stating that he is in pain and   would like a antibiotic sent to pharmacy  ---------------------------------------------------------------------------  --------------  [id number]  [phone]; OK to leave message on voicemail  ---------------------------------------------------------------------------  --------------  SCRIPT ANSWERS  Relationship to Patient?  Self

## 2023-01-26 DIAGNOSIS — E11.9 TYPE 2 DIABETES MELLITUS WITHOUT COMPLICATION, WITHOUT LONG-TERM CURRENT USE OF INSULIN (HCC): ICD-10-CM

## 2023-01-26 DIAGNOSIS — K04.7 DENTAL ABSCESS: Primary | ICD-10-CM

## 2023-01-26 RX ORDER — GLIPIZIDE 5 MG/1
5 TABLET ORAL 2 TIMES DAILY
Qty: 60 TABLET | Refills: 5 | Status: SHIPPED | OUTPATIENT
Start: 2023-01-26

## 2023-01-26 RX ORDER — CHLORHEXIDINE GLUCONATE 0.12 MG/ML
15 RINSE ORAL 2 TIMES DAILY
Qty: 118 ML | Refills: 1 | Status: SHIPPED | OUTPATIENT
Start: 2023-01-26 | End: 2023-02-03

## 2023-01-26 NOTE — PROGRESS NOTES
Called pharmacy, farxiga price is over $400. Will send in Jardiance to check pricing. Pt called and updated. Discussed clindamycin for dental infection and mouth wash.  Pt in agreement with plan

## 2023-01-30 ENCOUNTER — TELEPHONE (OUTPATIENT)
Dept: FAMILY MEDICINE CLINIC | Age: 63
End: 2023-01-30

## 2023-01-30 DIAGNOSIS — E11.9 TYPE 2 DIABETES MELLITUS WITHOUT COMPLICATION, WITHOUT LONG-TERM CURRENT USE OF INSULIN (HCC): ICD-10-CM

## 2023-01-30 NOTE — TELEPHONE ENCOUNTER
Patient called wanting to know what rx Sri Zheng NP was planning on sending over to the pharmacy for his diabetes.  Patient states the Rivera Cleaves was over $400    Please advise

## 2023-02-02 NOTE — TELEPHONE ENCOUNTER
Patient calling to check on the status of his message. Patient also states he has bursitis in his R leg and advil/tylenol is not working.  Please advise

## 2023-02-06 ENCOUNTER — TELEPHONE (OUTPATIENT)
Dept: FAMILY MEDICINE CLINIC | Age: 63
End: 2023-02-06

## 2023-02-06 DIAGNOSIS — E11.9 TYPE 2 DIABETES MELLITUS WITHOUT COMPLICATION, WITHOUT LONG-TERM CURRENT USE OF INSULIN (HCC): Primary | ICD-10-CM

## 2023-02-06 NOTE — TELEPHONE ENCOUNTER
Result were given to pt. Bursitis is hurting pt, otc meds not helping. He has not neen able to sleep. Pt states that farxiga is $400.  Unable to afford this med

## 2023-02-07 RX ORDER — NATEGLINIDE 60 MG/1
60 TABLET ORAL 2 TIMES DAILY
Qty: 90 TABLET | Refills: 3 | Status: CANCELLED | OUTPATIENT
Start: 2023-02-07 | End: 2024-02-07

## 2023-02-08 ENCOUNTER — TELEMEDICINE (OUTPATIENT)
Dept: FAMILY MEDICINE CLINIC | Age: 63
End: 2023-02-08
Payer: COMMERCIAL

## 2023-02-08 DIAGNOSIS — E11.69 TYPE 2 DIABETES MELLITUS WITH OTHER SPECIFIED COMPLICATION, WITHOUT LONG-TERM CURRENT USE OF INSULIN (HCC): Primary | ICD-10-CM

## 2023-02-08 DIAGNOSIS — M70.61 TROCHANTERIC BURSITIS OF RIGHT HIP: ICD-10-CM

## 2023-02-08 PROCEDURE — 99422 OL DIG E/M SVC 11-20 MIN: CPT | Performed by: NURSE PRACTITIONER

## 2023-02-08 RX ORDER — GABAPENTIN 300 MG/1
300 CAPSULE ORAL 2 TIMES DAILY
Qty: 60 CAPSULE | Refills: 0 | Status: SHIPPED | OUTPATIENT
Start: 2023-02-08 | End: 2023-03-10

## 2023-02-08 RX ORDER — PREDNISONE 20 MG/1
TABLET ORAL
Qty: 13 TABLET | Refills: 0 | Status: SHIPPED | OUTPATIENT
Start: 2023-02-08

## 2023-02-08 ASSESSMENT — ENCOUNTER SYMPTOMS
COLOR CHANGE: 0
BACK PAIN: 0

## 2023-02-08 NOTE — PROGRESS NOTES
Ana Laura Huerta (:  1960) is a Established patient, here for evaluation of the following:    Assessment & Plan   Below is the assessment and plan developed based on review of pertinent history, physical exam, labs, studies, and medications. 1. Type 2 diabetes mellitus with other specified complication, without long-term current use of insulin (HCC)  -     metFORMIN (GLUCOPHAGE) 500 MG tablet; Take 1 tablet by mouth 2 times daily (with meals), Disp-180 tablet, R-1Normal  -     gabapentin (NEURONTIN) 300 MG capsule; Take 1 capsule by mouth 2 times daily for 30 days. Intended supply: 30 days, Disp-60 capsule, R-0Normal  2. Trochanteric bursitis of right hip  -     predniSONE (DELTASONE) 20 MG tablet; Take 2 tablets daily x 5 days then take 1 tab daily x 3 days. Take with food, avoid taking close to bedtime, Disp-13 tablet, R-0Normal    No follow-ups on file. Subjective   Pt seen for follow up on dm and bursitis. DM: A1c 7.1 Was not tolerating 1000mg of metformin d/t gi upset and taste. Heaven Enrique and jardiance not covered by insurance and remain expensive with savings program. Currently takes glipizide 5mg    Bursitis: right hip pain increased over the last week. Was given steroid injection at ortho office, started on gabapentin, taking tylenol at home, having significant pain after lying down, cannot sleep d/t pain. Diabetes  He presents for his follow-up diabetic visit. He has type 2 diabetes mellitus. The initial diagnosis of diabetes was made 10 years ago. His disease course has been stable. There are no hypoglycemic associated symptoms. Pertinent negatives for hypoglycemia include no pallor. There are no diabetic associated symptoms. Pertinent negatives for diabetes include no fatigue, no polydipsia, no polyphagia and no polyuria. There are no hypoglycemic complications. Symptoms are stable. Diabetic complications include peripheral neuropathy.  Pertinent negatives for diabetic complications include no autonomic neuropathy, CVA, heart disease, impotence, nephropathy or PVD. Risk factors for coronary artery disease include diabetes mellitus and male sex. Current diabetic treatment includes diet and oral agent (dual therapy). He is compliant with treatment all of the time. His weight is decreasing steadily. He is following a diabetic diet. When asked about meal planning, he reported none. An ACE inhibitor/angiotensin II receptor blocker is not being taken. He does not see a podiatrist.Eye exam is current. Review of Systems   Constitutional:  Positive for activity change. Negative for appetite change, chills, diaphoresis, fatigue, fever and unexpected weight change. Endocrine: Negative for cold intolerance, heat intolerance, polydipsia, polyphagia and polyuria. Genitourinary:  Negative for impotence. Musculoskeletal:  Positive for arthralgias and gait problem. Negative for back pain, joint swelling, myalgias, neck pain and neck stiffness. Skin:  Negative for color change, pallor, rash and wound. Psychiatric/Behavioral:  Positive for sleep disturbance.          Objective   Patient-Reported Vitals  No data recorded     Physical Exam  [INSTRUCTIONS:  \"[x]\" Indicates a positive item  \"[]\" Indicates a negative item  -- DELETE ALL ITEMS NOT EXAMINED]    Constitutional: [x] Appears well-developed and well-nourished [x] No apparent distress      [] Abnormal -     Mental status: [x] Alert and awake  [x] Oriented to person/place/time [x] Able to follow commands    [] Abnormal -     Eyes:   EOM    [x]  Normal    [] Abnormal -   Sclera  [x]  Normal    [] Abnormal -          Discharge [x]  None visible   [] Abnormal -     HENT: [x] Normocephalic, atraumatic  [] Abnormal -   [x] Mouth/Throat: Mucous membranes are moist    External Ears [x] Normal  [] Abnormal -    Neck: [x] No visualized mass [] Abnormal -     Pulmonary/Chest: [x] Respiratory effort normal   [x] No visualized signs of difficulty breathing or respiratory distress        [] Abnormal -      Musculoskeletal:   [x] Normal gait with no signs of ataxia         [x] Normal range of motion of neck        [] Abnormal -     Neurological:        [x] No Facial Asymmetry (Cranial nerve 7 motor function) (limited exam due to video visit)          [x] No gaze palsy        [] Abnormal -          Skin:        [x] No significant exanthematous lesions or discoloration noted on facial skin         [] Abnormal -            Psychiatric:       [x] Normal Affect [] Abnormal -        [x] No Hallucinations    Other pertinent observable physical exam findings:-             Bertha Mathis, was evaluated through a synchronous (real-time) audio-video encounter. The patient (or guardian if applicable) is aware that this is a billable service, which includes applicable co-pays. This Virtual Visit was conducted with patient's (and/or legal guardian's) consent. The visit was conducted pursuant to the emergency declaration under the [facility] [address] authority and the [employer] General Act. Patient identification was verified, and a caregiver was present when appropriate.    The patient was located at Home: [address]  Provider was located at [facility] ([place] [address]): [address], [facility],  [place] [address], APRN - CNP

## 2023-02-08 NOTE — ASSESSMENT & PLAN NOTE
Uncontrolled, changes made today: increase gabapentin, start prednisone, If no improvement follow up with ortho

## 2023-02-16 DIAGNOSIS — M79.604 LEG PAIN, BILATERAL: ICD-10-CM

## 2023-02-16 DIAGNOSIS — E11.9 TYPE 2 DIABETES MELLITUS WITHOUT COMPLICATION, WITHOUT LONG-TERM CURRENT USE OF INSULIN (HCC): ICD-10-CM

## 2023-02-16 DIAGNOSIS — M79.605 LEG PAIN, BILATERAL: ICD-10-CM

## 2023-02-16 RX ORDER — GABAPENTIN 100 MG/1
100 CAPSULE ORAL 2 TIMES DAILY
Qty: 60 CAPSULE | Refills: 0 | Status: SHIPPED | OUTPATIENT
Start: 2023-02-16 | End: 2023-03-18

## 2023-02-16 NOTE — TELEPHONE ENCOUNTER
Medication:   Requested Prescriptions     Pending Prescriptions Disp Refills    gabapentin (NEURONTIN) 100 MG capsule [Pharmacy Med Name: GABAPENTIN 100MG CAPSULES] 60 capsule 0     Sig: TAKE 1 CAPSULE BY MOUTH TWICE DAILY        Last Filled:      Patient Phone Number: [phone] (home)     Last appt: 2/8/2023   Next appt: 3/17/2023    Last OARRS: No flowsheet data found.

## 2023-03-08 DIAGNOSIS — E11.69 TYPE 2 DIABETES MELLITUS WITH OTHER SPECIFIED COMPLICATION, WITHOUT LONG-TERM CURRENT USE OF INSULIN (HCC): ICD-10-CM

## 2023-03-08 RX ORDER — GABAPENTIN 300 MG/1
300 CAPSULE ORAL 2 TIMES DAILY PRN
Qty: 18 CAPSULE | Refills: 0 | Status: SHIPPED | OUTPATIENT
Start: 2023-03-08 | End: 2023-03-09 | Stop reason: SINTOL

## 2023-03-08 NOTE — TELEPHONE ENCOUNTER
Medication:   Requested Prescriptions     Pending Prescriptions Disp Refills    gabapentin (NEURONTIN) 300 MG capsule [Pharmacy Med Name: GABAPENTIN 300MG CAPSULES] 60 capsule 0     Sig: TAKE 1 CAPSULE BY MOUTH TWICE DAILY        Last Filled:      Patient Phone Number: [phone] (home)     Last appt: 2/8/2023   Next appt: 3/17/2023    Last OARRS: No flowsheet data found.

## 2023-03-09 ENCOUNTER — OFFICE VISIT (OUTPATIENT)
Dept: FAMILY MEDICINE CLINIC | Age: 63
End: 2023-03-09
Payer: MEDICARE

## 2023-03-09 VITALS
HEIGHT: 72 IN | BODY MASS INDEX: 21.16 KG/M2 | WEIGHT: 156.2 LBS | SYSTOLIC BLOOD PRESSURE: 120 MMHG | HEART RATE: 110 BPM | OXYGEN SATURATION: 98 % | RESPIRATION RATE: 16 BRPM | DIASTOLIC BLOOD PRESSURE: 86 MMHG | TEMPERATURE: 97.2 F

## 2023-03-09 DIAGNOSIS — M70.61 TROCHANTERIC BURSITIS OF RIGHT HIP: ICD-10-CM

## 2023-03-09 DIAGNOSIS — Z12.11 SCREENING FOR COLON CANCER: ICD-10-CM

## 2023-03-09 DIAGNOSIS — E11.9 TYPE 2 DIABETES MELLITUS WITHOUT COMPLICATION, WITHOUT LONG-TERM CURRENT USE OF INSULIN (HCC): Primary | ICD-10-CM

## 2023-03-09 DIAGNOSIS — K59.09 OTHER CONSTIPATION: ICD-10-CM

## 2023-03-09 LAB
BILIRUBIN, POC: NORMAL
BLOOD URINE, POC: NORMAL
CLARITY, POC: NORMAL
COLOR, POC: YELLOW
GLUCOSE URINE, POC: NORMAL
KETONES, POC: NORMAL
LEUKOCYTE EST, POC: NORMAL
NITRITE, POC: NORMAL
PH, POC: 5.5
PROTEIN, POC: NORMAL
SPECIFIC GRAVITY, POC: 1.03
UROBILINOGEN, POC: 0.2

## 2023-03-09 PROCEDURE — 81002 URINALYSIS NONAUTO W/O SCOPE: CPT | Performed by: NURSE PRACTITIONER

## 2023-03-09 PROCEDURE — 3051F HG A1C>EQUAL 7.0%<8.0%: CPT | Performed by: NURSE PRACTITIONER

## 2023-03-09 PROCEDURE — 99214 OFFICE O/P EST MOD 30 MIN: CPT | Performed by: NURSE PRACTITIONER

## 2023-03-09 SDOH — ECONOMIC STABILITY: HOUSING INSECURITY
IN THE LAST 12 MONTHS, WAS THERE A TIME WHEN YOU DID NOT HAVE A STEADY PLACE TO SLEEP OR SLEPT IN A SHELTER (INCLUDING NOW)?: NO

## 2023-03-09 SDOH — ECONOMIC STABILITY: FOOD INSECURITY: WITHIN THE PAST 12 MONTHS, YOU WORRIED THAT YOUR FOOD WOULD RUN OUT BEFORE YOU GOT MONEY TO BUY MORE.: NEVER TRUE

## 2023-03-09 SDOH — ECONOMIC STABILITY: FOOD INSECURITY: WITHIN THE PAST 12 MONTHS, THE FOOD YOU BOUGHT JUST DIDN'T LAST AND YOU DIDN'T HAVE MONEY TO GET MORE.: NEVER TRUE

## 2023-03-09 SDOH — ECONOMIC STABILITY: INCOME INSECURITY: HOW HARD IS IT FOR YOU TO PAY FOR THE VERY BASICS LIKE FOOD, HOUSING, MEDICAL CARE, AND HEATING?: NOT VERY HARD

## 2023-03-09 ASSESSMENT — ENCOUNTER SYMPTOMS
EYE REDNESS: 0
WHEEZING: 0
APNEA: 0
ABDOMINAL PAIN: 0
VISUAL CHANGE: 0
BLOOD IN STOOL: 0
VOMITING: 0
CONSTIPATION: 1
BLURRED VISION: 0
DIARRHEA: 0
SHORTNESS OF BREATH: 0
NAUSEA: 0
BACK PAIN: 0
COLOR CHANGE: 0
ABDOMINAL DISTENTION: 0
RECTAL PAIN: 0
EYE ITCHING: 0
CHEST TIGHTNESS: 0
TROUBLE SWALLOWING: 0
RHINORRHEA: 0
COUGH: 0

## 2023-03-09 NOTE — PROGRESS NOTES
Clau Quintana (:  1960) is a 57 y.o. male,Established patient, here for evaluation of the following chief complaint(s):  Abdominal Pain and Constipation (Has been taking laxatives daily: would like to know how long he should be taking )      ASSESSMENT/PLAN:  1. Type 2 diabetes mellitus without complication, without long-term current use of insulin ([facility])  Assessment & Plan:   Well-controlled, changes made today: Stop metformin and glipizide d/t side effects. Start Ankita Pae, follow up 4 weeks  Orders:  -     POCT Urinalysis no Micro  -     dapagliflozin (FARXIGA) 10 MG tablet; Take 1 tablet by mouth every morning, Disp-90 tablet, R-1Normal  2. Trochanteric bursitis of right hip  Assessment & Plan:   Unresolved, Start Physical therapy. Stop gabapentin as it may be worsening constipation  Orders:  -     [facility] - [place]  3. Other constipation  Assessment & Plan:   Uncontrolled, changes made today: start daily fiber supplement, continue adequate water intake  Orders:  -     psyllium (KONSYL) 28.3 % POWD powder; Take 3.4 g by mouth daily, Disp-283 g, R-5Normal  4. Screening for colon cancer  -     Fecal DNA Colorectal cancer screening (Cologuard)      Patient Instructions   Stop metformin and glipizide  Start farxiga 10mg daily  Start fiber powder daily  Schedule with Physical therapy  Stop gabapentin     Return in about 4 weeks (around 2023). SUBJECTIVE/OBJECTIVE:  Pt here with concerns of continued right hip pain, constipation, and DM    Hip Pain: saw ortho and received steroid injection for bursitis, no relief. Started gabapentin, minimal relief. Insurance recently changed, now willing to try PT    Constipation: not improving, still taking dulcolax twice weekly as needed to produce stool. Stool is hard        Diabetes  He presents for his follow-up diabetic visit. He has type 2 diabetes mellitus. His disease course has been worsening. There are no hypoglycemic associated symptoms. Pertinent negatives for hypoglycemia include no confusion, dizziness, headaches, nervousness/anxiousness or pallor. Associated symptoms include fatigue, foot paresthesias and weight loss. Pertinent negatives for diabetes include no blurred vision, no chest pain, no foot ulcerations, no polydipsia, no polyphagia, no polyuria, no visual change and no weakness. There are no hypoglycemic complications. Symptoms are worsening. Diabetic complications include peripheral neuropathy. Risk factors for coronary artery disease include diabetes mellitus and male sex. Current diabetic treatment includes diet and oral agent (monotherapy). He is compliant with treatment some of the time. He is following a diabetic diet. Meal planning includes avoidance of concentrated sweets and carbohydrate counting. He has not had a previous visit with a dietitian. He participates in exercise daily. Home blood sugar record trend: does not check sugars. An ACE inhibitor/angiotensin II receptor blocker is not being taken. He does not see a podiatrist.Eye exam is not current. Current Outpatient Medications   Medication Sig Dispense Refill    Magnesium Hydroxide (DULCOLAX PO) Take by mouth      psyllium (KONSYL) 28.3 % POWD powder Take 3.4 g by mouth daily 283 g 5    dapagliflozin (FARXIGA) 10 MG tablet Take 1 tablet by mouth every morning 90 tablet 1     No current facility-administered medications for this visit.        Past Medical History:  2012: Diabetes mellitus ([address]  No date: History of Clostridium difficile infection  Past Surgical History:   Procedure Laterality Date    CHOLECYSTECTOMY, LAPAROSCOPIC N/A 10/13/2020    ROBOTIC ASSISTED LAPAROSCOPIC CHOLECYSTECTOMY WITH CHOLECYSTODUODENAL FISTULA TAKEDOWN, DRAINAGE OF LIVER ABCESS, ESOPHAGOGASTRODUODENOSCOPY performed by Annie Casiano MD at [facility] [address]      ERCP  10/9/2020    ERCP STONE REMOVAL performed by Kylie Interiano MD at [facility]    ERCP  10/9/2020    ERCP SPHINCTER/PAPILLOTOMY performed by Calin Moreno MD at [address] Left 11/10/2017    OPERATIVE FIXATION OF LEFT RING FINGER PROXIMAL PHALANX AND LEFT SMALL FINGER MIDDLE PHALANX FRACTURES. INCLUDING OPEN REDUCTION INTERNAL FIXATION    LAPAROSCOPIC APPENDECTOMY      TURP N/A 12/6/2019    CYSTOSCOPY TRANSURETHRAL RESECTION PROSTATE performed by Juanjose Riojas DO at [address] N/A 10/9/2020    EGD DIAGNOSTIC ONLY performed by Calin Moreno MD at [address] History     Socioeconomic History    Marital status: Single     Spouse name: Not on file    Number of children: 3    Years of education: Not on file    Highest education level: Not on file   Occupational History    Occupation: Security   Tobacco Use    Smoking status: Former     Packs/day: 0.25     Years: 10.00     Pack years: 2.50     Types: Cigarettes     Quit date: 9/2/2019     Years since quitting: 3.5    Smokeless tobacco: Never   Vaping Use    Vaping Use: Never used   Substance and Sexual Activity    Alcohol use: Yes     Alcohol/week: 0.0 standard drinks     Comment: occasionally    Drug use: Not Currently     Types: Marijuana Darryle Pimple)    Sexual activity: Yes   Other Topics Concern    Not on file   Social History Narrative    Not on file     Social Determinants of Health     Financial Resource Strain: Low Risk     Difficulty of Paying Living Expenses: Not very hard   Food Insecurity: No Food Insecurity    Worried About Running Out of Food in the Last Year: Never true    Ran Out of Food in the Last Year: Never true   Transportation Needs: Unknown    Lack of Transportation (Medical): Not on file    Lack of Transportation (Non-Medical):  No   Physical Activity: Not on file   Stress: Not on file   Social Connections: Not on file   Intimate Partner Violence: Not on file   Housing Stability: Unknown    Unable to Pay for Housing in the Last Year: Not on file    Number of Places Lived in the Last Year: Not on file    Unstable Housing in the Last Year: No         Review of Systems   Constitutional:  Positive for fatigue, unexpected weight change and weight loss. Negative for activity change, appetite change, chills, diaphoresis and fever. HENT:  Negative for congestion, dental problem, ear pain, hearing loss, postnasal drip, rhinorrhea and trouble swallowing. Eyes:  Negative for blurred vision, redness, itching and visual disturbance. Respiratory:  Negative for apnea, cough, chest tightness, shortness of breath and wheezing. Cardiovascular:  Negative for chest pain, palpitations and leg swelling. Gastrointestinal:  Positive for constipation. Negative for abdominal distention, abdominal pain, blood in stool, diarrhea, nausea, rectal pain and vomiting. Endocrine: Negative for cold intolerance, heat intolerance, polydipsia, polyphagia and polyuria. Genitourinary:  Negative for decreased urine volume, difficulty urinating, dysuria, hematuria and urgency. Musculoskeletal:  Positive for arthralgias. Negative for back pain, gait problem, joint swelling, myalgias and neck pain. Skin:  Negative for color change, pallor and rash. Allergic/Immunologic: Negative for environmental allergies, food allergies and immunocompromised state. Neurological:  Negative for dizziness, weakness, light-headedness, numbness and headaches. Hematological:  Negative for adenopathy. Psychiatric/Behavioral:  Negative for agitation, confusion and sleep disturbance. The patient is not nervous/anxious.     Vitals:    03/09/23 0940   BP: 120/86   Pulse: (!) 110   Resp: 16   Temp: 97.2 °F (36.2 °C)   TempSrc: Temporal   SpO2: 98%   Weight: 156 lb 3.2 oz (70.9 kg)   Height: 6' (1.829 m)      Recent Results (from the past 2016 hour(s))   Hemoglobin A1C    Collection Time: 01/18/23  2:06 PM   Result Value Ref Range    Hemoglobin A1C 7.1 See comment %    eAG 157.1 mg/dL   Lipid Panel    Collection Time: 01/18/23  2:06 PM   Result Value Ref Range    Cholesterol, Total 162 0 - 199 mg/dL    Triglycerides 109 0 - 150 mg/dL    HDL 42 40 - 60 mg/dL    LDL Calculated 98 <100 mg/dL    VLDL Cholesterol Calculated 22 Not Established mg/dL   TSH    Collection Time: 01/18/23  2:06 PM   Result Value Ref Range    TSH 1.80 0.27 - 4.20 uIU/mL   Comprehensive Metabolic Panel    Collection Time: 01/18/23  2:06 PM   Result Value Ref Range    Sodium 138 136 - 145 mmol/L    Potassium 4.5 3.5 - 5.1 mmol/L    Chloride 99 99 - 110 mmol/L    CO2 24 21 - 32 mmol/L    Anion Gap 15 3 - 16    Glucose 191 (H) 70 - 99 mg/dL    BUN 26 (H) 7 - 20 mg/dL    Creatinine 1.2 0.8 - 1.3 mg/dL    Est, Glom Filt Rate >60 >60    Calcium 9.8 8.3 - 10.6 mg/dL    Total Protein 6.0 (L) 6.4 - 8.2 g/dL    Albumin 4.1 3.4 - 5.0 g/dL    Albumin/Globulin Ratio 2.2 1.1 - 2.2    Total Bilirubin 0.8 0.0 - 1.0 mg/dL    Alkaline Phosphatase 131 (H) 40 - 129 U/L    ALT 9 (L) 10 - 40 U/L    AST 11 (L) 15 - 37 U/L   CBC with Auto Differential    Collection Time: 01/18/23  2:07 PM   Result Value Ref Range    WBC 8.7 4.0 - 11.0 K/uL    RBC 4.43 4.20 - 5.90 M/uL    Hemoglobin 13.4 (L) 13.5 - 17.5 g/dL    Hematocrit 39.9 (L) 40.5 - 52.5 %    MCV 90.1 80.0 - 100.0 fL    MCH 30.3 26.0 - 34.0 pg    MCHC 33.6 31.0 - 36.0 g/dL    RDW 12.6 12.4 - 15.4 %    Platelets 606 382 - 188 K/uL    MPV 8.4 5.0 - 10.5 fL    Neutrophils % 55.9 %    Lymphocytes % 34.2 %    Monocytes % 6.5 %    Eosinophils % 2.8 %    Basophils % 0.6 %    Neutrophils Absolute 4.9 1.7 - 7.7 K/uL    Lymphocytes Absolute 3.0 1.0 - 5.1 K/uL    Monocytes Absolute 0.6 0.0 - 1.3 K/uL    Eosinophils Absolute 0.2 0.0 - 0.6 K/uL    Basophils Absolute 0.1 0.0 - 0.2 K/uL   Microalbumin / Creatinine Urine Ratio    Collection Time: 01/18/23  2:44 PM   Result Value Ref Range    Microalbumin, Random Urine 3.30 (H) <2.0 mg/dL    Creatinine, Ur 212.5 39.0 - 259.0 mg/dL    Microalbumin Creatinine Ratio 15.5 0.0 - 30.0 mg/g   POCT Urinalysis no Micro Collection Time: 03/09/23 10:59 AM   Result Value Ref Range    Color, UA yellow     Clarity, UA cloudy     Glucose, UA POC 2+     Bilirubin, UA neg     Ketones, UA neg     Spec Grav, UA 1.030     Blood, UA POC neg     pH, UA 5.5     Protein, UA POC neg     Urobilinogen, UA 0.2     Leukocytes, UA neg     Nitrite, UA neg         Wt Readings from Last 3 Encounters:   03/09/23 156 lb 3.2 oz (70.9 kg)   01/18/23 162 lb 12.8 oz (73.8 kg)   11/22/22 162 lb (73.5 kg)        Physical Exam  Vitals and nursing note reviewed. Constitutional:       General: He is not in acute distress. Appearance: Normal appearance. He is not ill-appearing. HENT:      Head: Normocephalic and atraumatic. Nose: Nose normal.   Eyes:      Extraocular Movements: Extraocular movements intact. Conjunctiva/sclera: Conjunctivae normal.      Pupils: Pupils are equal, round, and reactive to light. Neck:      Vascular: No carotid bruit. Cardiovascular:      Rate and Rhythm: Normal rate and regular rhythm. Pulses: Normal pulses. Heart sounds: Normal heart sounds. Pulmonary:      Effort: Pulmonary effort is normal.      Breath sounds: Normal breath sounds. Musculoskeletal:         General: No swelling. Normal range of motion. Cervical back: Normal range of motion and neck supple. Right lower leg: No edema. Left lower leg: No edema. Skin:     General: Skin is warm and dry. Capillary Refill: Capillary refill takes less than 2 seconds. Coloration: Skin is not jaundiced or pale. Neurological:      General: No focal deficit present. Mental Status: He is alert and oriented to person, place, and time. Motor: No weakness. Gait: Gait normal.   Psychiatric:         Mood and Affect: Mood normal.         Behavior: Behavior normal.         Thought Content:  Thought content normal.         Judgment: Judgment normal.                 An electronic signature was used to authenticate this note.    --SYDNEE Frazier - CNP

## 2023-03-09 NOTE — PATIENT INSTRUCTIONS
Stop metformin and glipizide  Start farxiga 10mg daily  Start fiber powder daily  Schedule with Physical therapy  Stop gabapentin

## 2023-03-09 NOTE — ASSESSMENT & PLAN NOTE
Well-controlled, changes made today: Stop metformin and glipizide d/t side effects.  Start farxiga, follow up 4 weeks

## 2023-03-30 DIAGNOSIS — M79.604 LEG PAIN, BILATERAL: ICD-10-CM

## 2023-03-30 DIAGNOSIS — E11.9 TYPE 2 DIABETES MELLITUS WITHOUT COMPLICATION, WITHOUT LONG-TERM CURRENT USE OF INSULIN (HCC): ICD-10-CM

## 2023-03-30 DIAGNOSIS — M79.605 LEG PAIN, BILATERAL: ICD-10-CM

## 2023-03-31 RX ORDER — GABAPENTIN 100 MG/1
CAPSULE ORAL
Qty: 60 CAPSULE | Refills: 0 | OUTPATIENT
Start: 2023-03-31

## 2023-04-03 ENCOUNTER — TELEPHONE (OUTPATIENT)
Dept: FAMILY MEDICINE CLINIC | Age: 63
End: 2023-04-03

## 2023-04-07 DIAGNOSIS — E11.9 TYPE 2 DIABETES MELLITUS WITHOUT COMPLICATION, WITHOUT LONG-TERM CURRENT USE OF INSULIN (HCC): ICD-10-CM

## 2023-04-07 RX ORDER — DAPAGLIFLOZIN 5 MG/1
TABLET, FILM COATED ORAL
Qty: 30 TABLET | Refills: 0 | OUTPATIENT
Start: 2023-04-07

## 2023-04-18 DIAGNOSIS — E11.9 TYPE 2 DIABETES MELLITUS WITHOUT COMPLICATION, WITHOUT LONG-TERM CURRENT USE OF INSULIN (HCC): ICD-10-CM

## 2023-04-18 NOTE — TELEPHONE ENCOUNTER
Medication:   Requested Prescriptions     Pending Prescriptions Disp Refills    dapagliflozin (FARXIGA) 10 MG tablet 90 tablet 1     Sig: Take 1 tablet by mouth every morning        Last Filled:      Patient Phone Number: [phone] (home)     Last appt: 3/9/2023   Next appt: Visit date not found    Last OARRS: No flowsheet data found.

## 2023-04-18 NOTE — TELEPHONE ENCOUNTER
Patient Called needing refill on prescription     dapagliflozin (FARXIGA) 10 MG tablet     Patient asking if he can get 60 days worth      Last Office Visit: 03/09/2023

## 2023-05-05 ENCOUNTER — OFFICE VISIT (OUTPATIENT)
Dept: FAMILY MEDICINE CLINIC | Age: 63
End: 2023-05-05
Payer: COMMERCIAL

## 2023-05-05 VITALS
DIASTOLIC BLOOD PRESSURE: 66 MMHG | HEART RATE: 114 BPM | RESPIRATION RATE: 16 BRPM | WEIGHT: 148.6 LBS | HEIGHT: 72 IN | SYSTOLIC BLOOD PRESSURE: 96 MMHG | TEMPERATURE: 97.5 F | BODY MASS INDEX: 20.13 KG/M2 | OXYGEN SATURATION: 96 %

## 2023-05-05 DIAGNOSIS — R19.7 DIARRHEA, UNSPECIFIED TYPE: ICD-10-CM

## 2023-05-05 DIAGNOSIS — R63.4 WEIGHT LOSS: ICD-10-CM

## 2023-05-05 DIAGNOSIS — E11.9 TYPE 2 DIABETES MELLITUS WITHOUT COMPLICATION, WITHOUT LONG-TERM CURRENT USE OF INSULIN (HCC): Primary | ICD-10-CM

## 2023-05-05 DIAGNOSIS — N50.819 PAIN IN TESTICLE, UNSPECIFIED LATERALITY: ICD-10-CM

## 2023-05-05 PROCEDURE — 3051F HG A1C>EQUAL 7.0%<8.0%: CPT | Performed by: NURSE PRACTITIONER

## 2023-05-05 PROCEDURE — 99213 OFFICE O/P EST LOW 20 MIN: CPT | Performed by: NURSE PRACTITIONER

## 2023-05-05 PROCEDURE — 1123F ACP DISCUSS/DSCN MKR DOCD: CPT | Performed by: NURSE PRACTITIONER

## 2023-05-05 RX ORDER — ONDANSETRON 4 MG/1
4 TABLET, FILM COATED ORAL 3 TIMES DAILY PRN
Qty: 30 TABLET | Refills: 0 | Status: SHIPPED | OUTPATIENT
Start: 2023-05-05

## 2023-05-05 RX ORDER — DICYCLOMINE HYDROCHLORIDE 10 MG/1
10 CAPSULE ORAL 4 TIMES DAILY
Qty: 120 CAPSULE | Refills: 0 | Status: SHIPPED | OUTPATIENT
Start: 2023-05-05

## 2023-05-05 ASSESSMENT — ENCOUNTER SYMPTOMS
COLOR CHANGE: 0
COUGH: 0
SORE THROAT: 0
VISUAL CHANGE: 0
ABDOMINAL DISTENTION: 0
FLATUS: 1
ABDOMINAL PAIN: 0
CHANGE IN BOWEL HABIT: 0
CONSTIPATION: 0
BLOOD IN STOOL: 0
SHORTNESS OF BREATH: 0
SWOLLEN GLANDS: 0
DIARRHEA: 1
NAUSEA: 1
VOMITING: 0

## 2023-05-05 NOTE — ASSESSMENT & PLAN NOTE
Well-controlled, changes made today: unable to check A1c in office today, decrease farxiga to 5mg, last refill was sent at increased dose, may be causing nausea/diarrhea

## 2023-05-05 NOTE — PATIENT INSTRUCTIONS
Decrease farxiga to 1/2 tablet once a day  Start zofran to help with nausea  Start dicyclomine to help with diarrhea  Drink electrolyte replacement drinks, avoid caffeine   Follow up with GI specialist

## 2023-05-05 NOTE — PROGRESS NOTES
General: No focal deficit present. Mental Status: He is alert and oriented to person, place, and time. Motor: No weakness. Gait: Gait normal.   Psychiatric:         Mood and Affect: Mood normal.         Behavior: Behavior normal.         Thought Content: Thought content normal.         Judgment: Judgment normal.                 An electronic signature was used to authenticate this note.     --SYDNEE Benedict - CNP

## 2023-05-05 NOTE — ASSESSMENT & PLAN NOTE
Treat symptoms with zofran and bentyl for now, needs gi follow up for chronic bowel issues.  Hydrate with electrolyte replacement, ED precautions discussed

## 2023-07-05 ENCOUNTER — TELEPHONE (OUTPATIENT)
Dept: FAMILY MEDICINE CLINIC | Age: 63
End: 2023-07-05

## 2023-07-05 NOTE — TELEPHONE ENCOUNTER
ECC transfer to Nurse triage to [facility]    Patient is calling with complaint of Pain in his right foot heel area , also intermittent pain shooting into bilateral legs patient is concerned about diabetic neuropathy    This has been going on  2 days     Patient information transferred to home office     Has patient tried any over the counter medications?  No      Please advise

## 2023-07-12 DIAGNOSIS — E11.9 TYPE 2 DIABETES MELLITUS WITHOUT COMPLICATION, WITHOUT LONG-TERM CURRENT USE OF INSULIN (HCC): ICD-10-CM

## 2023-07-12 NOTE — TELEPHONE ENCOUNTER
Medication:   Requested Prescriptions     Pending Prescriptions Disp Refills    FARXIGA 5 MG tablet [Pharmacy Med Name: [place] Vo 5MG TABLETS] 30 tablet      Sig: TAKE 1 TABLET BY MOUTH EVERY MORNING        Last Filled:      Patient Phone Number: [phone] (home)     Last appt: 5/5/2023   Next appt: Visit date not found    Last OARRS: No flowsheet data found.

## 2023-07-12 NOTE — TELEPHONE ENCOUNTER
Medication:   Requested Prescriptions     Pending Prescriptions Disp Refills    FARXIGA 5 MG tablet [Pharmacy Med Name: Anne Drum 5MG TABLETS] 30 tablet      Sig: TAKE 1 TABLET BY MOUTH EVERY MORNING        Last Filled:      Patient Phone Number: [phone] (home)     Last appt: 5/5/2023   Next appt: Visit date not found    Last OARRS: No flowsheet data found.

## 2023-07-13 RX ORDER — DAPAGLIFLOZIN 5 MG/1
TABLET, FILM COATED ORAL
Qty: 30 TABLET | OUTPATIENT
Start: 2023-07-13

## 2023-08-04 ENCOUNTER — TELEPHONE (OUTPATIENT)
Dept: FAMILY MEDICINE CLINIC | Age: 63
End: 2023-08-04

## 2023-08-04 NOTE — TELEPHONE ENCOUNTER
----- Message from Carol Fisher sent at 8/4/2023  9:42 AM EDT -----  Subject: Medication Problem    Medication: dapagliflozin (FARXIGA) 5 MG tablet  Dosage: 5mg once daily  Ordering Provider: esperanza evans    Question/Problem: pt lost weight and wants to gain weight, made pt feel   like he was itchy and had nerve pain so he quit taking it and he feels   better, call pt back to discuss his options for changing this med, asking   about taking marijuana to give appetite back       Pharmacy: [address] [place] [address] [phone] Jeaneth Davis [phone]    ---------------------------------------------------------------------------  --------------  Shae Delgado SCOTTIE  [phone]; OK to leave message on voicemail  ---------------------------------------------------------------------------  --------------    SCRIPT ANSWERS  Relationship to Patient: Self

## 2023-08-22 ENCOUNTER — OFFICE VISIT (OUTPATIENT)
Dept: FAMILY MEDICINE CLINIC | Age: 63
End: 2023-08-22

## 2023-08-22 VITALS
BODY MASS INDEX: 20.33 KG/M2 | HEART RATE: 107 BPM | SYSTOLIC BLOOD PRESSURE: 136 MMHG | WEIGHT: 150.1 LBS | OXYGEN SATURATION: 98 % | DIASTOLIC BLOOD PRESSURE: 80 MMHG | HEIGHT: 72 IN | TEMPERATURE: 98 F

## 2023-08-22 DIAGNOSIS — Z00.00 INITIAL MEDICARE ANNUAL WELLNESS VISIT: ICD-10-CM

## 2023-08-22 DIAGNOSIS — E11.9 TYPE 2 DIABETES MELLITUS WITHOUT COMPLICATION, WITHOUT LONG-TERM CURRENT USE OF INSULIN (HCC): ICD-10-CM

## 2023-08-22 DIAGNOSIS — R63.4 WEIGHT LOSS: Primary | ICD-10-CM

## 2023-08-22 ASSESSMENT — PATIENT HEALTH QUESTIONNAIRE - PHQ9
2. FEELING DOWN, DEPRESSED OR HOPELESS: 0
SUM OF ALL RESPONSES TO PHQ QUESTIONS 1-9: 0
SUM OF ALL RESPONSES TO PHQ9 QUESTIONS 1 & 2: 0
SUM OF ALL RESPONSES TO PHQ QUESTIONS 1-9: 0
1. LITTLE INTEREST OR PLEASURE IN DOING THINGS: 0

## 2023-08-22 ASSESSMENT — LIFESTYLE VARIABLES
HOW OFTEN DO YOU HAVE A DRINK CONTAINING ALCOHOL: MONTHLY OR LESS
HOW MANY STANDARD DRINKS CONTAINING ALCOHOL DO YOU HAVE ON A TYPICAL DAY: PATIENT DOES NOT DRINK

## 2023-08-22 NOTE — PROGRESS NOTES
Medicare Annual Wellness Visit    Landen Azul is here for Follow-up    Assessment & Plan   Weight loss  Assessment & Plan:   Uncontrolled, stop diabetes medications, schedule with gi  Orders:  -     Thyroid Peroxidase Antibody; Future  -     FREE T4; Future  -     TSH; Future  Initial Medicare annual wellness visit  Type 2 diabetes mellitus without complication, without long-term current use of insulin ([address])  Assessment & Plan:   Well-controlled, changes made today: can stop medications    Recommendations for Preventive Services Due: see orders and patient instructions/AVS.  Recommended screening schedule for the next 5-10 years is provided to the patient in written form: see Patient Instructions/AVS.     Return in 3 months (on 11/22/2023). Subjective   The following acute and/or chronic problems were also addressed today:  Problem List Items Addressed This Visit          Unprioritized    Diabetes mellitus ([address])      Well-controlled, changes made today: can stop medications         Weight loss - Primary      Uncontrolled, stop diabetes medications, schedule with gi         Relevant Orders    Thyroid Peroxidase Antibody    FREE T4    TSH     Other Visit Diagnoses       Initial Medicare annual wellness visit                 Patient's complete Health Risk Assessment and screening values have been reviewed and are found in Flowsheets. The following problems were reviewed today and where indicated follow up appointments were made and/or referrals ordered. Positive Risk Factor Screenings with Interventions:       Cognitive:    Words recalled: 0 Words Recalled   Clock Drawing Test (CDT): Normal   Total Score: (!) 2   Total Score Interpretation: Abnormal Mini-Cog      Interventions:  Patient declines any further evaluation or treatment                Vision Screen:  Do you have difficulty driving, watching TV, or doing any of your daily activities because of your eyesight?: No  Have you had an eye exam within the

## 2023-11-09 ENCOUNTER — TELEPHONE (OUTPATIENT)
Dept: FAMILY MEDICINE CLINIC | Age: 63
End: 2023-11-09

## 2023-11-09 NOTE — TELEPHONE ENCOUNTER
Nurse Triage     Cc: pt called stating he has been having on and off chest pain & tightness. Pt states the pain and tightness has become more consistent over the last couple of days. States he feels the pain in his left side and in ribs. Pt  stated he was going to go to the ER to get checked out but wanted to make sure Dr Minnie Friedman didn't want him to come in office. Per Dr Minnie Friedman she gave the ok for pt to go to ER to be examined. Pt will be going to [facility]. Pt was informed we would be keeping an eye out for his ER visit.

## 2023-12-06 NOTE — PLAN OF CARE
Problem: Nutrition  Goal: Optimal nutrition therapy  Outcome: Ongoing  Note: Nutrition Problem #1: Inadequate oral intake  Intervention: Food and/or Nutrient Delivery: Continue NPO(adv diet per MD )  Nutritional Goals: Pt will tolerate diet adv and consume >50% of meals normal

## 2024-04-16 ENCOUNTER — OFFICE VISIT (OUTPATIENT)
Dept: FAMILY MEDICINE CLINIC | Age: 64
End: 2024-04-16
Payer: MEDICARE

## 2024-04-16 VITALS
DIASTOLIC BLOOD PRESSURE: 69 MMHG | TEMPERATURE: 97.7 F | SYSTOLIC BLOOD PRESSURE: 126 MMHG | RESPIRATION RATE: 16 BRPM | HEIGHT: 72 IN | HEART RATE: 82 BPM | WEIGHT: 170 LBS | OXYGEN SATURATION: 98 % | BODY MASS INDEX: 23.03 KG/M2

## 2024-04-16 DIAGNOSIS — H61.21 IMPACTED CERUMEN OF RIGHT EAR: ICD-10-CM

## 2024-04-16 DIAGNOSIS — H60.501 ACUTE OTITIS EXTERNA OF RIGHT EAR, UNSPECIFIED TYPE: ICD-10-CM

## 2024-04-16 DIAGNOSIS — E11.9 TYPE 2 DIABETES MELLITUS WITHOUT COMPLICATION, WITHOUT LONG-TERM CURRENT USE OF INSULIN (HCC): Primary | ICD-10-CM

## 2024-04-16 PROBLEM — R19.7 DIARRHEA: Status: RESOLVED | Noted: 2023-05-05 | Resolved: 2024-04-16

## 2024-04-16 PROBLEM — K59.09 OTHER CONSTIPATION: Status: RESOLVED | Noted: 2023-03-09 | Resolved: 2024-04-16

## 2024-04-16 PROBLEM — M70.61 TROCHANTERIC BURSITIS OF RIGHT HIP: Status: RESOLVED | Noted: 2023-02-08 | Resolved: 2024-04-16

## 2024-04-16 PROBLEM — N50.819 PAIN IN TESTICLE: Status: RESOLVED | Noted: 2023-05-05 | Resolved: 2024-04-16

## 2024-04-16 PROBLEM — R63.4 WEIGHT LOSS: Status: RESOLVED | Noted: 2023-05-05 | Resolved: 2024-04-16

## 2024-04-16 PROBLEM — Z98.890 HISTORY OF TRANSURETHRAL RESECTION OF PROSTATE: Status: RESOLVED | Noted: 2019-12-06 | Resolved: 2024-04-16

## 2024-04-16 PROBLEM — Z90.79 HISTORY OF TRANSURETHRAL RESECTION OF PROSTATE: Status: RESOLVED | Noted: 2019-12-06 | Resolved: 2024-04-16

## 2024-04-16 LAB — HBA1C MFR BLD: 6.5 %

## 2024-04-16 PROCEDURE — 99214 OFFICE O/P EST MOD 30 MIN: CPT | Performed by: NURSE PRACTITIONER

## 2024-04-16 PROCEDURE — 3044F HG A1C LEVEL LT 7.0%: CPT | Performed by: NURSE PRACTITIONER

## 2024-04-16 PROCEDURE — 83036 HEMOGLOBIN GLYCOSYLATED A1C: CPT | Performed by: NURSE PRACTITIONER

## 2024-04-16 PROCEDURE — 1123F ACP DISCUSS/DSCN MKR DOCD: CPT | Performed by: NURSE PRACTITIONER

## 2024-04-16 SDOH — ECONOMIC STABILITY: FOOD INSECURITY: WITHIN THE PAST 12 MONTHS, YOU WORRIED THAT YOUR FOOD WOULD RUN OUT BEFORE YOU GOT MONEY TO BUY MORE.: NEVER TRUE

## 2024-04-16 SDOH — ECONOMIC STABILITY: FOOD INSECURITY: WITHIN THE PAST 12 MONTHS, THE FOOD YOU BOUGHT JUST DIDN'T LAST AND YOU DIDN'T HAVE MONEY TO GET MORE.: NEVER TRUE

## 2024-04-16 SDOH — ECONOMIC STABILITY: INCOME INSECURITY: HOW HARD IS IT FOR YOU TO PAY FOR THE VERY BASICS LIKE FOOD, HOUSING, MEDICAL CARE, AND HEATING?: NOT HARD AT ALL

## 2024-04-16 ASSESSMENT — ENCOUNTER SYMPTOMS
COUGH: 0
SORE THROAT: 0
RHINORRHEA: 0
VOMITING: 0
DIARRHEA: 0
ABDOMINAL PAIN: 0

## 2024-04-16 ASSESSMENT — PATIENT HEALTH QUESTIONNAIRE - PHQ9
1. LITTLE INTEREST OR PLEASURE IN DOING THINGS: NOT AT ALL
SUM OF ALL RESPONSES TO PHQ QUESTIONS 1-9: 0
2. FEELING DOWN, DEPRESSED OR HOPELESS: NOT AT ALL
SUM OF ALL RESPONSES TO PHQ9 QUESTIONS 1 & 2: 0
SUM OF ALL RESPONSES TO PHQ QUESTIONS 1-9: 0

## 2024-04-16 NOTE — PATIENT INSTRUCTIONS
Get fasting labs drawn at lab location below    Follow up in 2 weeks for annual wellness visit and lab review    [facility] Locations - No appointment necessary.  ? indicates the location is open Saturdays in addition to Monday through Friday.   Call your preferred location for test preparation, business hours and other information you need.   [facility] accepts all insurances.  CENTRAL  [place]  [place]    ? [place]   [address]   [address]   [place] [address]    Ph: [phone]  [facility]   [address] [place] [address]    Ph: [phone]   ? [place]   [address]    [place] [address]    Ph: [phone]     [facility]   [address] [place] [address]    Ph: [phone] ? [place]   [address] [place] [address]   Ph: [phone]  ? [facility]   [address] [place] [address]    Ph: [phone]      [place]   [address] [place] [address]   Ph: [phone]    NORTH    ? [facility]   [address] [place] [address]    Ph: [phone]  [facility]   [address] [place] [address]   Ph: [phone]  [place]   [address] [place], [place] [address].   [facility], [address]    PH: [phone]    [facility]   [address] [place] [address]    Ph: [phone]  [place]  [address] [place] [address]  Ph: [phone]  [facility]   [address] [place] [address]    Ph: [phone]

## 2024-04-16 NOTE — ASSESSMENT & PLAN NOTE
Well controlled, continue current plan. Needs labs updated, ordered today, pt advised to schedule wellness visit after lab draw

## 2024-04-16 NOTE — PROGRESS NOTES
Removal Procedure Note    Indication: ear cerumen impaction    Procedure: After placing the patient's head in the appropriate position, the patient's right ear canal was irrigated with the appropriate solution until all cerumen was removed and the ear canal was clear.  At this point, the procedure was complete.     The patient tolerated the procedure well.    Complications: None       Eyes:      Extraocular Movements: Extraocular movements intact.      Conjunctiva/sclera: Conjunctivae normal.   Cardiovascular:      Rate and Rhythm: Normal rate and regular rhythm.      Heart sounds: Normal heart sounds.   Pulmonary:      Effort: Pulmonary effort is normal.      Breath sounds: Normal breath sounds. No wheezing or rhonchi.   Musculoskeletal:      Cervical back: Normal range of motion.   Neurological:      General: No focal deficit present.      Mental Status: He is alert and oriented to person, place, and time.      Gait: Gait normal.   Psychiatric:         Mood and Affect: Mood normal.         Behavior: Behavior normal.                 An electronic signature was used to authenticate this note.    --SYDNEE Perez - CNP

## 2024-08-09 ENCOUNTER — HOSPITAL ENCOUNTER (EMERGENCY)
Age: 64
Discharge: HOME | End: 2024-08-09
Attending: EMERGENCY MEDICINE
Payer: COMMERCIAL

## 2024-08-09 ENCOUNTER — APPOINTMENT (OUTPATIENT)
Dept: GENERAL RADIOLOGY | Age: 64
End: 2024-08-09
Payer: COMMERCIAL

## 2024-08-09 VITALS
RESPIRATION RATE: 14 BRPM | OXYGEN SATURATION: 97 % | WEIGHT: 177.03 LBS | BODY MASS INDEX: 23.98 KG/M2 | SYSTOLIC BLOOD PRESSURE: 153 MMHG | DIASTOLIC BLOOD PRESSURE: 71 MMHG | HEART RATE: 89 BPM | HEIGHT: 72 IN | TEMPERATURE: 98 F

## 2024-08-09 DIAGNOSIS — R03.0 ELEVATED BLOOD PRESSURE READING: ICD-10-CM

## 2024-08-09 DIAGNOSIS — R07.89 RIGHT-SIDED CHEST WALL PAIN: Primary | ICD-10-CM

## 2024-08-09 PROCEDURE — 99283 EMERGENCY DEPT VISIT LOW MDM: CPT

## 2024-08-09 PROCEDURE — 71101 X-RAY EXAM UNILAT RIBS/CHEST: CPT

## 2024-08-09 RX ORDER — DICLOFENAC SODIUM 75 MG/1
75 TABLET, DELAYED RELEASE ORAL 2 TIMES DAILY
Qty: 20 TABLET | Refills: 0 | Status: SHIPPED | OUTPATIENT
Start: 2024-08-09 | End: 2024-08-14

## 2024-08-09 RX ORDER — LIDOCAINE 50 MG/G
1 PATCH TOPICAL DAILY
Qty: 10 PATCH | Refills: 0 | Status: SHIPPED | OUTPATIENT
Start: 2024-08-09 | End: 2024-08-19

## 2024-08-09 ASSESSMENT — PAIN DESCRIPTION - DESCRIPTORS: DESCRIPTORS: SHARP;SHOOTING

## 2024-08-09 ASSESSMENT — PAIN DESCRIPTION - ORIENTATION: ORIENTATION: LEFT

## 2024-08-09 ASSESSMENT — PAIN SCALES - GENERAL: PAINLEVEL_OUTOF10: 10

## 2024-08-09 ASSESSMENT — PAIN - FUNCTIONAL ASSESSMENT
PAIN_FUNCTIONAL_ASSESSMENT: PREVENTS OR INTERFERES SOME ACTIVE ACTIVITIES AND ADLS
PAIN_FUNCTIONAL_ASSESSMENT: 0-10

## 2024-08-09 ASSESSMENT — PAIN DESCRIPTION - LOCATION: LOCATION: RIB CAGE

## 2024-08-09 ASSESSMENT — PAIN DESCRIPTION - PAIN TYPE: TYPE: ACUTE PAIN

## 2024-08-09 NOTE — ED PROVIDER NOTES
EMERGENCY DEPARTMENT ENCOUNTER     [facility] EMERGENCY DEPARTMENT     Pt Name: Franko Kyle   MRN: [id number]   Birthdate 1960   Date of evaluation: 8/9/2024   Provider: Kleber Keller II, DO   PCP: Mauro Gutierrez APRN - CNP   Note Started: 7:25 PM EDT 8/9/24     CHIEF COMPLAINT     Chief Complaint   Patient presents with    Rib Injury     Pt c/o rib pain x 1 wk, unrelieved by OTC medication.        HISTORY OF PRESENT ILLNESS:  History from : Patient   Limitations to history : None     Franko Kyle is a 64 y.o. male who presents to the emergency department complaining of left-sided rib injury.  Patient states that approximately 1 week ago he was leaning over a concrete wall when he slipped and fell landing on his right chest wall.  Patient states that he has had ongoing right sided chest wall discomfort unrelieved with Motrin.  Patient denies shortness of breath or hemoptysis.  No additional injury noted    Nursing Notes were all reviewed and agreed with or any disagreements were addressed in the HPI.     ROS: Positives and Pertinent negatives as per HPI.    PAST MEDICAL HISTORY     Past medical history:  has a past medical history of Diabetes mellitus (HCC) (2012), History of Clostridium difficile infection, History of transurethral resection of prostate (12/06/2019), Other constipation (03/09/2023), Trochanteric bursitis of right hip (02/08/2023), and Weight loss (05/05/2023).    Past surgical history:  has a past surgical history that includes laparoscopic appendectomy; Hand surgery (Left, 11/10/2017); Colonoscopy; TURP (N/A, 12/6/2019); Upper gastrointestinal endoscopy (N/A, 10/9/2020); ERCP (10/9/2020); ERCP (10/9/2020); Cholecystectomy, laparoscopic (N/A, 10/13/2020); Upper gastrointestinal endoscopy (N/A, 6/13/2023); and Colonoscopy (N/A, 6/13/2023).      PHYSICAL EXAM:  ED Triage Vitals [08/09/24 1346]   BP Temp Temp Source Pulse Respirations SpO2 Height Weight - Scale   (!) 153/71 98 °F  (36.7 °C) Oral 89 14 97 % 1.829 m (6') 80.3 kg (177 lb 0.5 oz)        Physical Exam     Head: Normocephalic/atraumatic.  Heart: Regular rhythm.  Normal S1-S2.  Lungs were clear to auscultation bilaterally.  Wheezes, rales, rhonchi.  Chest wall: Left lateral chest wall discomfort to palpation.  No evidence of crepitus or flail chest.  No overlying contusion.  Abdomen is soft and nontender.  No rebound or guarding.  Extremities are atraumatic    DIAGNOSTIC RESULTS   LABS:   Labs Reviewed - No data to display   When ordered only abnormal lab results are displayed. All other labs were within normal range or not returned as of this dictation.     EKG:      RADIOLOGY:    Non-plain film images such as CT, Ultrasound and MRI are read by the radiologist. Plain radiographic images are visualized and preliminarily interpreted by the ED Provider with the below findings:      Interpretation per the Radiologist below, if available at the time of this note:    XR RIBS LEFT INCLUDE CHEST (MIN 3 VIEWS)   Final Result   1. Minimal left costophrenic angle discoid atelectasis with similar changes in   the right   2. No evidence of bone fracture or displacement   3. No active airspace disease or effusion.         Electronically signed by Jefferson Contreras               EMERGENCY DEPARTMENT COURSE and DIFFERENTIAL DIAGNOSIS/MDM:     Vitals:    Vitals:    08/09/24 1346   BP: (!) 153/71   Pulse: 89   Resp: 14   Temp: 98 °F (36.7 °C)   TempSrc: Oral   SpO2: 97%   Weight: 80.3 kg (177 lb 0.5 oz)   Height: 1.829 m (6')        Patient was given the following medications:   Medications - No data to display          CC/HPI Summary, DDx, ED Course, and Reassessment:   Patient presents to the emergency department with left-sided chest wall discomfort x 1 week.  Isolated injury reported to left-sided chest wall.  No shortness of breath or hemoptysis.  Vitals are stable on arrival with noted hypertension.  On exam, patient does have left-sided chest

## 2024-08-14 ENCOUNTER — OFFICE VISIT (OUTPATIENT)
Dept: FAMILY MEDICINE CLINIC | Age: 64
End: 2024-08-14
Payer: COMMERCIAL

## 2024-08-14 VITALS
OXYGEN SATURATION: 98 % | WEIGHT: 179.2 LBS | SYSTOLIC BLOOD PRESSURE: 130 MMHG | RESPIRATION RATE: 16 BRPM | HEART RATE: 82 BPM | BODY MASS INDEX: 24.27 KG/M2 | TEMPERATURE: 97.9 F | HEIGHT: 72 IN | DIASTOLIC BLOOD PRESSURE: 80 MMHG

## 2024-08-14 DIAGNOSIS — R07.81 RIB PAIN: Primary | ICD-10-CM

## 2024-08-14 DIAGNOSIS — E11.69 TYPE 2 DIABETES MELLITUS WITH OTHER SPECIFIED COMPLICATION, WITHOUT LONG-TERM CURRENT USE OF INSULIN (HCC): ICD-10-CM

## 2024-08-14 DIAGNOSIS — N52.8 OTHER MALE ERECTILE DYSFUNCTION: ICD-10-CM

## 2024-08-14 PROCEDURE — 3044F HG A1C LEVEL LT 7.0%: CPT | Performed by: NURSE PRACTITIONER

## 2024-08-14 PROCEDURE — 1123F ACP DISCUSS/DSCN MKR DOCD: CPT | Performed by: NURSE PRACTITIONER

## 2024-08-14 PROCEDURE — 99214 OFFICE O/P EST MOD 30 MIN: CPT | Performed by: NURSE PRACTITIONER

## 2024-08-14 RX ORDER — TRAMADOL HYDROCHLORIDE 50 MG/1
50 TABLET ORAL EVERY 4 HOURS PRN
Qty: 42 TABLET | Refills: 0 | Status: SHIPPED | OUTPATIENT
Start: 2024-08-14 | End: 2024-08-21

## 2024-08-14 RX ORDER — SILDENAFIL 50 MG/1
50 TABLET, FILM COATED ORAL PRN
Qty: 6 TABLET | Refills: 2 | Status: SHIPPED | OUTPATIENT
Start: 2024-08-14

## 2024-08-14 ASSESSMENT — ENCOUNTER SYMPTOMS
SHORTNESS OF BREATH: 0
WHEEZING: 0
CHEST TIGHTNESS: 0

## 2024-08-14 NOTE — PROGRESS NOTES
Franko Kyle (:  1960) is a 64 y.o. male,Established patient, here for evaluation of the following chief complaint(s):  Follow-up (Follow up)      ASSESSMENT/PLAN:  1. Rib pain  -     traMADol (ULTRAM) 50 MG tablet; Take 1 tablet by mouth every 4 hours as needed for Pain for up to 7 days. Intended supply: 7 days. Take lowest dose possible to manage pain Max Daily Amount: 300 mg, Disp-42 tablet, R-0Normal  2. Type 2 diabetes mellitus with other specified complication, without long-term current use of insulin ([facility])  Assessment & Plan:   Well-controlled, continue current treatment plan  Orders:  -     Comprehensive Metabolic Panel; Future  -     Hemoglobin A1C; Future  -     Lipid Panel; Future  3. Other male erectile dysfunction  Assessment & Plan:   The patient desires Viagra to treat his erectile dysfunction. History and physical exam has not disclosed any obvious treatable cause of this complaint. He is informed that Viagra is sometimes not covered by insurance. It is available on a fee-for-service cost basis, and is relatively expensive. He can start with 50 mg dose, The method of use 1 hour prior to anticipated intercourse is explained. He should not use any more than one tablet in a 24 hour period. The side effects of possible headache, flushing, dyspepsia and transient changes in vision have been explained.     The patient is not taking nitrates, and denies he has access to nitrates in any form at any time. I have counseled him that taking Viagra with nitrates of any form can cause death. Additionally, Viagra serum concentrations can be increased by the following: cimetidine, erythromycin, itraconazole or ketoconazole. This patient does not take these drugs, but I have counseled him to avoid Viagra if he does take any of these.    We have also discussed the fact that there have been some deaths in patients after taking Viagra, felt due to the exertion of intercourse rather than the drug itself.

## 2024-08-14 NOTE — ASSESSMENT & PLAN NOTE
The patient desires Viagra to treat his erectile dysfunction. History and physical exam has not disclosed any obvious treatable cause of this complaint. He is informed that Viagra is sometimes not covered by insurance. It is available on a fee-for-service cost basis, and is relatively expensive. He can start with 50 mg dose, The method of use 1 hour prior to anticipated intercourse is explained. He should not use any more than one tablet in a 24 hour period. The side effects of possible headache, flushing, dyspepsia and transient changes in vision have been explained.     The patient is not taking nitrates, and denies he has access to nitrates in any form at any time. I have counseled him that taking Viagra with nitrates of any form can cause death. Additionally, Viagra serum concentrations can be increased by the following: cimetidine, erythromycin, itraconazole or ketoconazole. This patient does not take these drugs, but I have counseled him to avoid Viagra if he does take any of these.    We have also discussed the fact that there have been some deaths in patients after taking Viagra, felt due to the exertion of intercourse rather than the drug itself. The patient is aware of this, and accepts whatever unknown degree of risk there is in this aspect.

## 2024-08-15 ENCOUNTER — TELEPHONE (OUTPATIENT)
Dept: FAMILY MEDICINE CLINIC | Age: 64
End: 2024-08-15

## 2024-08-15 DIAGNOSIS — N52.8 OTHER MALE ERECTILE DYSFUNCTION: Primary | ICD-10-CM

## 2024-08-15 NOTE — TELEPHONE ENCOUNTER
Pharmacy states drug is not covered by pt plan.  The preferred alternative is tadalafiltabmg.   Please call/fax the pharmacy to change medication along w/ strength, directions, quantity, and refills.     [place]

## 2024-08-22 RX ORDER — TADALAFIL 10 MG/1
10 TABLET ORAL PRN
Qty: 10 TABLET | Refills: 5 | Status: SHIPPED | OUTPATIENT
Start: 2024-08-22

## 2024-11-19 ENCOUNTER — OFFICE VISIT (OUTPATIENT)
Dept: FAMILY MEDICINE CLINIC | Age: 64
End: 2024-11-19
Payer: COMMERCIAL

## 2024-11-19 ENCOUNTER — TELEPHONE (OUTPATIENT)
Dept: FAMILY MEDICINE CLINIC | Age: 64
End: 2024-11-19

## 2024-11-19 VITALS
WEIGHT: 187 LBS | HEIGHT: 72 IN | TEMPERATURE: 97.9 F | HEART RATE: 58 BPM | DIASTOLIC BLOOD PRESSURE: 68 MMHG | OXYGEN SATURATION: 97 % | SYSTOLIC BLOOD PRESSURE: 114 MMHG | BODY MASS INDEX: 25.33 KG/M2

## 2024-11-19 DIAGNOSIS — E11.69 TYPE 2 DIABETES MELLITUS WITH OTHER SPECIFIED COMPLICATION, WITHOUT LONG-TERM CURRENT USE OF INSULIN (HCC): ICD-10-CM

## 2024-11-19 DIAGNOSIS — H61.21 IMPACTED CERUMEN OF RIGHT EAR: ICD-10-CM

## 2024-11-19 DIAGNOSIS — F17.290 OTHER TOBACCO PRODUCT NICOTINE DEPENDENCE, UNCOMPLICATED: ICD-10-CM

## 2024-11-19 DIAGNOSIS — N52.8 OTHER MALE ERECTILE DYSFUNCTION: ICD-10-CM

## 2024-11-19 DIAGNOSIS — Z00.00 MEDICARE ANNUAL WELLNESS VISIT, SUBSEQUENT: Primary | ICD-10-CM

## 2024-11-19 DIAGNOSIS — Z13.6 SCREENING FOR AAA (ABDOMINAL AORTIC ANEURYSM): ICD-10-CM

## 2024-11-19 DIAGNOSIS — Z13.6 SCREENING FOR CARDIOVASCULAR CONDITION: ICD-10-CM

## 2024-11-19 PROBLEM — F17.200 NICOTINE DEPENDENCE: Status: ACTIVE | Noted: 2024-11-19

## 2024-11-19 PROCEDURE — 3044F HG A1C LEVEL LT 7.0%: CPT | Performed by: NURSE PRACTITIONER

## 2024-11-19 PROCEDURE — 1160F RVW MEDS BY RX/DR IN RCRD: CPT | Performed by: NURSE PRACTITIONER

## 2024-11-19 PROCEDURE — 1159F MED LIST DOCD IN RCRD: CPT | Performed by: NURSE PRACTITIONER

## 2024-11-19 PROCEDURE — 1123F ACP DISCUSS/DSCN MKR DOCD: CPT | Performed by: NURSE PRACTITIONER

## 2024-11-19 PROCEDURE — G0439 PPPS, SUBSEQ VISIT: HCPCS | Performed by: NURSE PRACTITIONER

## 2024-11-19 RX ORDER — TADALAFIL 10 MG/1
10 TABLET ORAL PRN
Qty: 10 TABLET | Refills: 5 | Status: SHIPPED | OUTPATIENT
Start: 2024-11-19 | End: 2024-11-21 | Stop reason: SDUPTHER

## 2024-11-19 RX ORDER — NICOTINE 21 MG/24HR
1 PATCH, TRANSDERMAL 24 HOURS TRANSDERMAL DAILY
Qty: 28 PATCH | Refills: 5 | Status: SHIPPED | OUTPATIENT
Start: 2024-11-19 | End: 2024-11-21 | Stop reason: SDUPTHER

## 2024-11-19 ASSESSMENT — PATIENT HEALTH QUESTIONNAIRE - PHQ9
SUM OF ALL RESPONSES TO PHQ QUESTIONS 1-9: 0
SUM OF ALL RESPONSES TO PHQ9 QUESTIONS 1 & 2: 0
1. LITTLE INTEREST OR PLEASURE IN DOING THINGS: NOT AT ALL
SUM OF ALL RESPONSES TO PHQ QUESTIONS 1-9: 0
SUM OF ALL RESPONSES TO PHQ QUESTIONS 1-9: 0
2. FEELING DOWN, DEPRESSED OR HOPELESS: NOT AT ALL
SUM OF ALL RESPONSES TO PHQ QUESTIONS 1-9: 0

## 2024-11-19 ASSESSMENT — LIFESTYLE VARIABLES
HOW MANY STANDARD DRINKS CONTAINING ALCOHOL DO YOU HAVE ON A TYPICAL DAY: PATIENT DOES NOT DRINK
HOW OFTEN DO YOU HAVE A DRINK CONTAINING ALCOHOL: NEVER

## 2024-11-19 NOTE — ASSESSMENT & PLAN NOTE
Can continue cialis, follow up scheduled with urologist at [facility]    The patient desires Cialis to treat his erectile dysfunction. History and physical exam has not disclosed any obvious treatable cause of this complaint. He is informed that Viagra is sometimes not covered by insurance. It is available on a fee-for-service cost basis, and is relatively expensive. He can start with 50 mg dose, The method of use 1 hour prior to anticipated intercourse is explained. He should not use any more than one tablet in a 24 hour period. The side effects of possible headache, flushing, dyspepsia and transient changes in vision have been explained.     The patient is not taking nitrates, and denies he has access to nitrates in any form at any time. I have counseled him that taking Viagra with nitrates of any form can cause death. Additionally, Viagra serum concentrations can be increased by the following: cimetidine, erythromycin, itraconazole or ketoconazole. This patient does not take these drugs, but I have counseled him to avoid Viagra if he does take any of these.    We have also discussed the fact that there have been some deaths in patients after taking Viagra, felt due to the exertion of intercourse rather than the drug itself. The patient is aware of this, and accepts whatever unknown degree of risk there is in this aspect.

## 2024-11-19 NOTE — TELEPHONE ENCOUNTER
Pt stated that when he got to the pharmacy they told him that his insurance did not cover there and he would like his medication be sent to [facility] on [address] in [place].

## 2024-11-19 NOTE — ASSESSMENT & PLAN NOTE
Chronic, not at goal (unstable), changes made today: start nicotine patches which have been successful in the past

## 2024-11-19 NOTE — PROGRESS NOTES
past  Orders:  -     nicotine (NICODERM CQ) 14 MG/24HR; Place 1 patch onto the skin daily, Disp-28 patch, R-5Normal  Impacted cerumen of right ear  Assessment & Plan:   Acute condition, recurrent, start debrox, return 1 week for irrigation  Orders:  -     carbamide peroxide (DEBROX) 6.5 % otic solution; Place 5 drops in ear(s) 2 times daily, Disp-15 mL, R-0Normal  Screening for cardiovascular condition  Screening for AAA (abdominal aortic aneurysm)  -     [place] screening for AAA; Future  Type 2 diabetes mellitus with other specified complication, without long-term current use of insulin ([facility])  Assessment & Plan:   Chronic, at goal (stable), continue current plan pending work up below    Recommendations for Preventive Services Due: see orders and patient instructions/AVS.  Recommended screening schedule for the next 5-10 years is provided to the patient in written form: see Patient Instructions/AVS.     Return in 3 months (on 2/19/2025).     Subjective   The following acute and/or chronic problems were also addressed today:  Cerumen impaction, muffled hearing    Patient's complete Health Risk Assessment and screening values have been reviewed and are found in Flowsheets. The following problems were reviewed today and where indicated follow up appointments were made and/or referrals ordered.    No Positive Risk Factors identified today.    CV Risk Counseling:  Patient was asked about his current diet and exercise habits, and personalized advice was provided regarding recommended lifestyle changes. Patient's individual cardiovascular disease risk factors, including advanced age (> 55 for men, > 65 for women), diabetes mellitus, male gender, and smoking/tobacco exposure, were discussed, as well as the likely benefits of lifestyle changes. Based upon patient's motivation to change his behavior, the following plan was agreed upon to work toward lowering cardiovascular disease risk: tobacco cessation.  Aspirin use for primary

## 2024-11-20 ENCOUNTER — TELEPHONE (OUTPATIENT)
Dept: FAMILY MEDICINE CLINIC | Age: 64
End: 2024-11-20

## 2024-11-20 NOTE — TELEPHONE ENCOUNTER
nicotine (NICODERM CQ) 14 MG/24HR    tadalafil (CIALIS) 10 MG tablet    carbamide peroxide (DEBROX) 6.5 % otic solution     Pt called and requested that all of these medications be to [facility] in [place]    Insurance will not pay for scripts sent to [facility]

## 2024-11-21 RX ORDER — TADALAFIL 10 MG/1
10 TABLET ORAL PRN
Qty: 10 TABLET | Refills: 5 | Status: SHIPPED | OUTPATIENT
Start: 2024-11-21

## 2024-11-21 RX ORDER — NICOTINE 21 MG/24HR
1 PATCH, TRANSDERMAL 24 HOURS TRANSDERMAL DAILY
Qty: 28 PATCH | Refills: 5 | Status: SHIPPED | OUTPATIENT
Start: 2024-11-21

## (undated) DEVICE — GARMENT,MEDLINE,DVT,INT,CALF,MED, GEN2: Brand: MEDLINE

## (undated) DEVICE — LAPAROSCOPIC SCISSORS: Brand: EPIX LAPAROSCOPIC SCISSORS

## (undated) DEVICE — CATHETER URET 4FR L70CM POLYUR WHSTL FLX TIP KINK RESIST W/

## (undated) DEVICE — SOLUTION IV IRRIG WATER 1000ML POUR BRL 2F7114

## (undated) DEVICE — TROCAR: Brand: KII FIOS FIRST ENTRY

## (undated) DEVICE — LARGE NEEDLE DRIVER: Brand: ENDOWRIST

## (undated) DEVICE — SUCTION IRRIGATOR: Brand: ENDOWRIST

## (undated) DEVICE — ELECTROSURGICAL PENCIL ROCKER SWITCH NON COATED BLADE ELECTRODE 10 FT (3 M) CORD HOLSTER: Brand: MEGADYNE

## (undated) DEVICE — SKIN MARKER REGULAR TIP WITH RULER CAP AND LABELS: Brand: DEVON

## (undated) DEVICE — RESERVOIR,SUCTION,100CC,SILICONE: Brand: MEDLINE

## (undated) DEVICE — DRAINBAG,ANTI-REFLUX TOWER,L/F,2000ML,LL: Brand: MEDLINE

## (undated) DEVICE — CANNULATING SPHINCTEROTOME: Brand: AUTOTOME™ RX 39

## (undated) DEVICE — Z INACTIVE USE 2635504 SOLUTION IRRIG 3000ML 1.5% GL USP UROMATIC CONT

## (undated) DEVICE — APPLICATOR MEDICATED 26 CC SOLUTION HI LT ORNG CHLORAPREP

## (undated) DEVICE — CATHETER IV 14GA L5.25IN PERIPH ORNG FEP POLYMER 3 BVL

## (undated) DEVICE — BLADELESS OBTURATOR: Brand: WECK VISTA

## (undated) DEVICE — PLATE ES AD W 9FT CRD 2

## (undated) DEVICE — STERILE POLYISOPRENE POWDER-FREE SURGICAL GLOVES WITH EMOLLIENT COATING: Brand: PROTEXIS

## (undated) DEVICE — Z INACTIVE USE 2660664 SOLUTION IRRIG 3000ML 0.9% SOD CHL USP UROMATIC PLAS CONT

## (undated) DEVICE — CABLE ENDOSCP L10FT ACT DISP

## (undated) DEVICE — POSITIONER HD AD W4.5XH8XL9IN HIGHLY RESILIENT FOAM CMFRT

## (undated) DEVICE — Z DUP USE 2641840 CLIP INT L POLYMER LOK LIG HEM O LOK

## (undated) DEVICE — SOLUTION INJ LR VISIV 1000ML BG

## (undated) DEVICE — SYSTEM SMK EVAC LAP TBNG FILTER HSNG BENT STYL PNK SEE CLR

## (undated) DEVICE — SOLUTION IV 1000ML 0.9% SOD CHL

## (undated) DEVICE — SOLUTION SCRB 4OZ 10% POVIDONE IOD ANTIMIC BTL

## (undated) DEVICE — JEWISH HOSPITAL TURNOVER KIT: Brand: MEDLINE INDUSTRIES, INC.

## (undated) DEVICE — ARM DRAPE

## (undated) DEVICE — ELECTRODE PT RET AD L9FT HI MOIST COND ADH HYDRGEL CORDED

## (undated) DEVICE — SURGICAL SET UP - SURE SET: Brand: MEDLINE INDUSTRIES, INC.

## (undated) DEVICE — SUTURE PERMA-HAND SZ 3-0 L30IN NONABSORBABLE BLK L17MM RB-1 K872H

## (undated) DEVICE — ELECTRODE ENDOSCP MPLR CUT LOOP DISPOSABLE 28FR ACMI

## (undated) DEVICE — LARGE HEM-O-LOK CLIP APPLIER: Brand: ENDOWRIST;DAVINCI SI

## (undated) DEVICE — SUTURE PERMA-HAND SZ 2-0 L30IN NONABSORBABLE BLK L26MM SH K833H

## (undated) DEVICE — SYRINGE MED 30ML STD CLR PLAS LUERLOCK TIP N CTRL DISP

## (undated) DEVICE — SCISSORS SURG DIA8MM MPLR CRV ENDOWRIST

## (undated) DEVICE — Y-TYPE TUR/BLADDER IRRIGATION SET, REGULATING CLAMP

## (undated) DEVICE — DRAIN SURG 19FR 100% SIL RADPQ RND CHN FULL FLUT

## (undated) DEVICE — ADHESIVE SKIN CLSR 0.7ML TOP DERMBND ADV

## (undated) DEVICE — GUIDEWIRE ENDOSCP DIA0.025IN L270CM HYDRPHLC STR TIP RG

## (undated) DEVICE — SYSTEM BX CAP BILI RAP EXCHG CAP LOK DEV COMPATIBLE W/ OLY

## (undated) DEVICE — SPONGE,LAP,4"X18",XR,ST,5/PK,40PK/CS: Brand: MEDLINE INDUSTRIES, INC.

## (undated) DEVICE — FENESTRATED BIPOLAR FORCEPS: Brand: ENDOWRIST

## (undated) DEVICE — PUMP SUC IRR TBNG L10FT W/ HNDPC ASSEMB STRYKEFLOW 2

## (undated) DEVICE — Z DISCONTINUED BY MEDLINE USE 2711682 TRAY SKIN PREP DRY W/ PREM GLV

## (undated) DEVICE — PACK,CYSTOSCOPY,PK III,AURORA: Brand: MEDLINE

## (undated) DEVICE — TROCAR: Brand: KII OPTICAL ACCESS SYSTEM

## (undated) DEVICE — SUTURE MCRYL SZ 4-0 L27IN ABSRB UD L19MM PS-2 1/2 CIR PRIM Y426H

## (undated) DEVICE — Device

## (undated) DEVICE — RETRIEVAL BALLOON CATHETER: Brand: EXTRACTOR™ PRO RX

## (undated) DEVICE — SOLUTION ANTIFOG VIS SYS CLEARIFY LAPSCP

## (undated) DEVICE — BAG URO DRN URO-CATCHER

## (undated) DEVICE — GOWN,SIRUS,POLYRNF,BRTHSLV,LG,30/CS: Brand: MEDLINE

## (undated) DEVICE — SYRINGE MED 10ML LUERLOCK TIP W/O SFTY DISP

## (undated) DEVICE — SURE SET-DOUBLE BASIN-LF: Brand: MEDLINE INDUSTRIES, INC.

## (undated) DEVICE — MARYLAND BIPOLAR FORCEPS: Brand: ENDOWRIST

## (undated) DEVICE — DRAPE C ARM UNIV W41XL74IN CLR PLAS XR VELC CLSR POLY STRP

## (undated) DEVICE — DALE FOLEY CATHETER HOLDER, LEGBAND, FITS UP TO 30": Brand: DALE FOLEY CATHETER HOLDER

## (undated) DEVICE — TOWEL,OR,DSP,ST,BLUE,STD,4/PK,20PK/CS: Brand: MEDLINE

## (undated) DEVICE — MERCY HEALTH WEST TURNOVER: Brand: MEDLINE INDUSTRIES, INC.

## (undated) DEVICE — STERILE SURGICAL LUBRICANT, METAL TUBE: Brand: SURGILUBE

## (undated) DEVICE — DRAPE,LAP,CHOLE,W/TROUGHS,STERILE: Brand: MEDLINE

## (undated) DEVICE — CANNULA SEAL

## (undated) DEVICE — [HIGH FLOW INSUFFLATOR,  DO NOT USE IF PACKAGE IS DAMAGED,  KEEP DRY,  KEEP AWAY FROM SUNLIGHT,  PROTECT FROM HEAT AND RADIOACTIVE SOURCES.]: Brand: PNEUMOSURE

## (undated) DEVICE — TIP-UP FENESTRATED GRASPER: Brand: ENDOWRIST

## (undated) DEVICE — PAD DRY FLOOR ABS 32X58IN GRN

## (undated) DEVICE — GLOVE ORANGE PI 8   MSG9080

## (undated) DEVICE — GLOVE SURG SZ 7 L12IN FNGR THK75MIL WHT LTX POLYMER BEAD

## (undated) DEVICE — LARGE HEM-O-LOK CLIP APPLIER: Brand: ENDOWRIST

## (undated) DEVICE — STANDARD HYPODERMIC NEEDLE,POLYPROPYLENE HUB: Brand: MONOJECT

## (undated) DEVICE — 40583 XL ADVANCED TRENDELENBURG POSITIONING KIT: Brand: 40583 XL ADVANCED TRENDELENBURG POSITIONING KIT

## (undated) DEVICE — TIP COVER ACCESSORY

## (undated) DEVICE — SYRINGE 20ML LL S/C 50

## (undated) DEVICE — TISSUE RETRIEVAL SYSTEM: Brand: INZII RETRIEVAL SYSTEM

## (undated) DEVICE — LARGE BORE STOPCOCK WITH ROTATING MALE LUER LOCK